# Patient Record
Sex: FEMALE | Race: WHITE | NOT HISPANIC OR LATINO
[De-identification: names, ages, dates, MRNs, and addresses within clinical notes are randomized per-mention and may not be internally consistent; named-entity substitution may affect disease eponyms.]

---

## 2017-05-04 ENCOUNTER — RESULT REVIEW (OUTPATIENT)
Age: 72
End: 2017-05-04

## 2017-05-10 ENCOUNTER — APPOINTMENT (OUTPATIENT)
Dept: MAMMOGRAPHY | Facility: CLINIC | Age: 72
End: 2017-05-10

## 2017-05-10 ENCOUNTER — APPOINTMENT (OUTPATIENT)
Dept: ULTRASOUND IMAGING | Facility: CLINIC | Age: 72
End: 2017-05-10

## 2017-06-12 ENCOUNTER — RESULT REVIEW (OUTPATIENT)
Age: 72
End: 2017-06-12

## 2017-07-19 ENCOUNTER — RESULT REVIEW (OUTPATIENT)
Age: 72
End: 2017-07-19

## 2017-09-14 ENCOUNTER — APPOINTMENT (OUTPATIENT)
Dept: MRI IMAGING | Facility: CLINIC | Age: 72
End: 2017-09-14

## 2017-09-14 ENCOUNTER — OUTPATIENT (OUTPATIENT)
Dept: OUTPATIENT SERVICES | Facility: HOSPITAL | Age: 72
LOS: 1 days | End: 2017-09-14
Payer: COMMERCIAL

## 2017-09-14 DIAGNOSIS — Z00.8 ENCOUNTER FOR OTHER GENERAL EXAMINATION: ICD-10-CM

## 2017-09-14 PROCEDURE — 72148 MRI LUMBAR SPINE W/O DYE: CPT | Mod: 26

## 2017-09-14 PROCEDURE — 72148 MRI LUMBAR SPINE W/O DYE: CPT

## 2019-01-10 ENCOUNTER — RESULT REVIEW (OUTPATIENT)
Age: 74
End: 2019-01-10

## 2019-01-14 ENCOUNTER — APPOINTMENT (OUTPATIENT)
Dept: UROLOGY | Facility: CLINIC | Age: 74
End: 2019-01-14
Payer: COMMERCIAL

## 2019-01-14 VITALS
HEART RATE: 71 BPM | HEIGHT: 61 IN | WEIGHT: 153 LBS | SYSTOLIC BLOOD PRESSURE: 159 MMHG | BODY MASS INDEX: 28.89 KG/M2 | OXYGEN SATURATION: 96 % | DIASTOLIC BLOOD PRESSURE: 81 MMHG | TEMPERATURE: 98 F

## 2019-01-14 DIAGNOSIS — E78.00 PURE HYPERCHOLESTEROLEMIA, UNSPECIFIED: ICD-10-CM

## 2019-01-14 DIAGNOSIS — Z87.891 PERSONAL HISTORY OF NICOTINE DEPENDENCE: ICD-10-CM

## 2019-01-14 DIAGNOSIS — Z85.038 PERSONAL HISTORY OF OTHER MALIGNANT NEOPLASM OF LARGE INTESTINE: ICD-10-CM

## 2019-01-14 DIAGNOSIS — Z82.49 FAMILY HISTORY OF ISCHEMIC HEART DISEASE AND OTHER DISEASES OF THE CIRCULATORY SYSTEM: ICD-10-CM

## 2019-01-14 DIAGNOSIS — Z78.9 OTHER SPECIFIED HEALTH STATUS: ICD-10-CM

## 2019-01-14 PROCEDURE — 99203 OFFICE O/P NEW LOW 30 MIN: CPT

## 2019-01-14 NOTE — LETTER BODY
[Dear  ___] : Dear  [unfilled], [Consult Letter:] : I had the pleasure of evaluating your patient, [unfilled]. [( Thank you for referring [unfilled] for consultation for _____ )] : Thank you for referring [unfilled] for consultation for [unfilled] [Please see my note below.] : Please see my note below. [Consult Closing:] : Thank you very much for allowing me to participate in the care of this patient.  If you have any questions, please do not hesitate to contact me. [Sincerely,] : Sincerely, [FreeTextEntry3] : Isaac Mckay MD\par  of Urology\par [facility]\par \par Offices:\par [facility] @\par [address], [place] [address]\par and\par [address], [place] [address], [address]\par and\par [address] [address]\par \par TEL: [phone]\par FAX: [phone]

## 2019-01-14 NOTE — PHYSICAL EXAM
[General Appearance - Well Developed] : well developed [Normal Appearance] : normal appearance [General Appearance - In No Acute Distress] : no acute distress [FreeTextEntry1] : normal peripheral circulation  [] : no respiratory distress [Abdomen Soft] : soft [Abdomen Tenderness] : non-tender [Abdomen Mass (___ Cm)] : no abdominal mass palpated [Costovertebral Angle Tenderness] : no ~M costovertebral angle tenderness [Normal Station and Gait] : the gait and station were normal for the patient's age [Skin Color & Pigmentation] : normal skin color and pigmentation [No Focal Deficits] : no focal deficits [Oriented To Time, Place, And Person] : oriented to person, place, and time [No Palpable Adenopathy] : no palpable adenopathy

## 2019-01-14 NOTE — HISTORY OF PRESENT ILLNESS
[FreeTextEntry1] : 72 yo female presents for Renal cyst. \par Has history of Renal cyst and has been getting Renal Ultrasound. Recent US with increase in size. \par Also has been having right lower back pain radiating to lower extremity. HAs history of Sciatica. \par Denies any difficulty with urination. \par Denies dysuria, fever, chills or rigors. \par Has been told of blood in urine, denies gross hematuria or history of Kidney stone. \par

## 2019-01-14 NOTE — ASSESSMENT
[FreeTextEntry1] : Reviewed outside records.\par 10 cm simple right renal cyst. \par Urine tests either UTI or contamination. \par \par Renal cyst:\par Discussed that Renal cysts are a common finding on routine radiological studies. Autopsy studies in patients over the age of 50 reveal greater than a 50% chance of having at least one simple renal cyst.\par And that renal cysts may be classified as simple or complex depending how they look on imaging. Discussed complexity of renal cysts and its implications as regards malignancy. \par Discussed unlike her back pain from Renal cyst. \par Discussed treatment options of Surgery Vs Aspiration. \par Asked pt to see Neurology and if no other source for back pain will consider surgery. \par \par Pt instructed on clean catch mid stream urine. Will do it with Primary care physician. \par If positive for hematuria will need work up with Urinalysis, Urine culture and Urine cytology, CT scan with and with out contrast and Cystoscopy. \par

## 2019-01-14 NOTE — REVIEW OF SYSTEMS
[Palpitations] : palpitations [Shortness Of Breath] : shortness of breath [Heartburn] : heartburn [Leakage of urine with straining, coughing, laughing] : leakage of urine with straining, coughing, laughing [Joint Pain] : joint pain [Muscle Weakness] : muscle weakness [Negative] : Heme/Lymph

## 2019-03-20 ENCOUNTER — EMERGENCY (EMERGENCY)
Facility: HOSPITAL | Age: 74
LOS: 1 days | End: 2019-03-20
Attending: STUDENT IN AN ORGANIZED HEALTH CARE EDUCATION/TRAINING PROGRAM
Payer: COMMERCIAL

## 2019-03-20 VITALS
RESPIRATION RATE: 16 BRPM | TEMPERATURE: 98 F | SYSTOLIC BLOOD PRESSURE: 126 MMHG | HEART RATE: 72 BPM | DIASTOLIC BLOOD PRESSURE: 64 MMHG | OXYGEN SATURATION: 99 %

## 2019-03-20 VITALS
RESPIRATION RATE: 22 BRPM | DIASTOLIC BLOOD PRESSURE: 77 MMHG | HEART RATE: 92 BPM | TEMPERATURE: 98 F | OXYGEN SATURATION: 97 % | SYSTOLIC BLOOD PRESSURE: 180 MMHG | WEIGHT: 151.9 LBS

## 2019-03-20 LAB
ALBUMIN SERPL ELPH-MCNC: 3.6 G/DL — SIGNIFICANT CHANGE UP (ref 3.3–5)
ALP SERPL-CCNC: 82 U/L — SIGNIFICANT CHANGE UP (ref 40–120)
ALT FLD-CCNC: 22 U/L — SIGNIFICANT CHANGE UP (ref 12–78)
AMYLASE P1 CFR SERPL: 80 U/L — SIGNIFICANT CHANGE UP (ref 25–115)
ANION GAP SERPL CALC-SCNC: 9 MMOL/L — SIGNIFICANT CHANGE UP (ref 5–17)
AST SERPL-CCNC: 10 U/L — LOW (ref 15–37)
BASOPHILS # BLD AUTO: 0.02 K/UL — SIGNIFICANT CHANGE UP (ref 0–0.2)
BASOPHILS NFR BLD AUTO: 0.2 % — SIGNIFICANT CHANGE UP (ref 0–2)
BILIRUB SERPL-MCNC: 0.7 MG/DL — SIGNIFICANT CHANGE UP (ref 0.2–1.2)
BUN SERPL-MCNC: 19 MG/DL — SIGNIFICANT CHANGE UP (ref 7–23)
CALCIUM SERPL-MCNC: 8.5 MG/DL — SIGNIFICANT CHANGE UP (ref 8.5–10.1)
CHLORIDE SERPL-SCNC: 102 MMOL/L — SIGNIFICANT CHANGE UP (ref 96–108)
CK MB BLD-MCNC: 2.6 % — SIGNIFICANT CHANGE UP (ref 0–3.5)
CK MB BLD-MCNC: 2.7 % — SIGNIFICANT CHANGE UP (ref 0–3.5)
CK MB CFR SERPL CALC: 2 NG/ML — SIGNIFICANT CHANGE UP (ref 0–3.6)
CK MB CFR SERPL CALC: 2.7 NG/ML — SIGNIFICANT CHANGE UP (ref 0–3.6)
CK SERPL-CCNC: 104 U/L — SIGNIFICANT CHANGE UP (ref 26–192)
CK SERPL-CCNC: 73 U/L — SIGNIFICANT CHANGE UP (ref 26–192)
CO2 SERPL-SCNC: 30 MMOL/L — SIGNIFICANT CHANGE UP (ref 22–31)
CREAT SERPL-MCNC: 0.91 MG/DL — SIGNIFICANT CHANGE UP (ref 0.5–1.3)
EOSINOPHIL # BLD AUTO: 0.21 K/UL — SIGNIFICANT CHANGE UP (ref 0–0.5)
EOSINOPHIL NFR BLD AUTO: 2.1 % — SIGNIFICANT CHANGE UP (ref 0–6)
GLUCOSE SERPL-MCNC: 129 MG/DL — HIGH (ref 70–99)
HCT VFR BLD CALC: 42.2 % — SIGNIFICANT CHANGE UP (ref 34.5–45)
HGB BLD-MCNC: 13.1 G/DL — SIGNIFICANT CHANGE UP (ref 11.5–15.5)
IMM GRANULOCYTES NFR BLD AUTO: 0.9 % — SIGNIFICANT CHANGE UP (ref 0–1.5)
INR BLD: 0.91 RATIO — SIGNIFICANT CHANGE UP (ref 0.88–1.16)
LIDOCAIN IGE QN: 290 U/L — SIGNIFICANT CHANGE UP (ref 73–393)
LYMPHOCYTES # BLD AUTO: 2.59 K/UL — SIGNIFICANT CHANGE UP (ref 1–3.3)
LYMPHOCYTES # BLD AUTO: 26.3 % — SIGNIFICANT CHANGE UP (ref 13–44)
MANUAL SMEAR VERIFICATION: SIGNIFICANT CHANGE UP
MCHC RBC-ENTMCNC: 20.6 PG — LOW (ref 27–34)
MCHC RBC-ENTMCNC: 31 GM/DL — LOW (ref 32–36)
MCV RBC AUTO: 66.2 FL — LOW (ref 80–100)
MONOCYTES # BLD AUTO: 0.92 K/UL — HIGH (ref 0–0.9)
MONOCYTES NFR BLD AUTO: 9.3 % — SIGNIFICANT CHANGE UP (ref 2–14)
NEUTROPHILS # BLD AUTO: 6.03 K/UL — SIGNIFICANT CHANGE UP (ref 1.8–7.4)
NEUTROPHILS NFR BLD AUTO: 61.2 % — SIGNIFICANT CHANGE UP (ref 43–77)
NRBC # BLD: 0 /100 WBCS — SIGNIFICANT CHANGE UP (ref 0–0)
PLAT MORPH BLD: NORMAL — SIGNIFICANT CHANGE UP
PLATELET # BLD AUTO: 237 K/UL — SIGNIFICANT CHANGE UP (ref 150–400)
POTASSIUM SERPL-MCNC: 3.8 MMOL/L — SIGNIFICANT CHANGE UP (ref 3.5–5.3)
POTASSIUM SERPL-SCNC: 3.8 MMOL/L — SIGNIFICANT CHANGE UP (ref 3.5–5.3)
PROT SERPL-MCNC: 7.2 G/DL — SIGNIFICANT CHANGE UP (ref 6–8.3)
PROTHROM AB SERPL-ACNC: 10.4 SEC — SIGNIFICANT CHANGE UP (ref 10–12.9)
RBC # BLD: 6.37 M/UL — HIGH (ref 3.8–5.2)
RBC # FLD: 17.7 % — HIGH (ref 10.3–14.5)
RBC BLD AUTO: SIGNIFICANT CHANGE UP
SODIUM SERPL-SCNC: 141 MMOL/L — SIGNIFICANT CHANGE UP (ref 135–145)
TROPONIN I SERPL-MCNC: <.015 NG/ML — SIGNIFICANT CHANGE UP (ref 0.01–0.04)
TROPONIN I SERPL-MCNC: <.015 NG/ML — SIGNIFICANT CHANGE UP (ref 0.01–0.04)
WBC # BLD: 9.86 K/UL — SIGNIFICANT CHANGE UP (ref 3.8–10.5)
WBC # FLD AUTO: 9.86 K/UL — SIGNIFICANT CHANGE UP (ref 3.8–10.5)

## 2019-03-20 PROCEDURE — 85027 COMPLETE CBC AUTOMATED: CPT

## 2019-03-20 PROCEDURE — 96375 TX/PRO/DX INJ NEW DRUG ADDON: CPT

## 2019-03-20 PROCEDURE — 36415 COLL VENOUS BLD VENIPUNCTURE: CPT

## 2019-03-20 PROCEDURE — 96374 THER/PROPH/DIAG INJ IV PUSH: CPT

## 2019-03-20 PROCEDURE — 99285 EMERGENCY DEPT VISIT HI MDM: CPT

## 2019-03-20 PROCEDURE — 84484 ASSAY OF TROPONIN QUANT: CPT

## 2019-03-20 PROCEDURE — 83690 ASSAY OF LIPASE: CPT

## 2019-03-20 PROCEDURE — 82150 ASSAY OF AMYLASE: CPT

## 2019-03-20 PROCEDURE — 99285 EMERGENCY DEPT VISIT HI MDM: CPT | Mod: 25

## 2019-03-20 PROCEDURE — 85610 PROTHROMBIN TIME: CPT

## 2019-03-20 PROCEDURE — 71045 X-RAY EXAM CHEST 1 VIEW: CPT | Mod: 26

## 2019-03-20 PROCEDURE — 82553 CREATINE MB FRACTION: CPT

## 2019-03-20 PROCEDURE — 96361 HYDRATE IV INFUSION ADD-ON: CPT

## 2019-03-20 PROCEDURE — 99284 EMERGENCY DEPT VISIT MOD MDM: CPT | Mod: 25

## 2019-03-20 PROCEDURE — 80053 COMPREHEN METABOLIC PANEL: CPT

## 2019-03-20 PROCEDURE — 93005 ELECTROCARDIOGRAM TRACING: CPT

## 2019-03-20 PROCEDURE — 93010 ELECTROCARDIOGRAM REPORT: CPT

## 2019-03-20 PROCEDURE — 82550 ASSAY OF CK (CPK): CPT

## 2019-03-20 PROCEDURE — 71045 X-RAY EXAM CHEST 1 VIEW: CPT

## 2019-03-20 RX ORDER — ASPIRIN/CALCIUM CARB/MAGNESIUM 324 MG
325 TABLET ORAL ONCE
Qty: 0 | Refills: 0 | Status: COMPLETED | OUTPATIENT
Start: 2019-03-20 | End: 2019-03-20

## 2019-03-20 RX ORDER — MORPHINE SULFATE 50 MG/1
2 CAPSULE, EXTENDED RELEASE ORAL ONCE
Qty: 0 | Refills: 0 | Status: DISCONTINUED | OUTPATIENT
Start: 2019-03-20 | End: 2019-03-20

## 2019-03-20 RX ORDER — SODIUM CHLORIDE 9 MG/ML
1000 INJECTION INTRAMUSCULAR; INTRAVENOUS; SUBCUTANEOUS ONCE
Qty: 0 | Refills: 0 | Status: COMPLETED | OUTPATIENT
Start: 2019-03-20 | End: 2019-03-20

## 2019-03-20 RX ORDER — KETOROLAC TROMETHAMINE 30 MG/ML
30 SYRINGE (ML) INJECTION ONCE
Qty: 0 | Refills: 0 | Status: DISCONTINUED | OUTPATIENT
Start: 2019-03-20 | End: 2019-03-20

## 2019-03-20 RX ORDER — FAMOTIDINE 10 MG/ML
20 INJECTION INTRAVENOUS ONCE
Qty: 0 | Refills: 0 | Status: COMPLETED | OUTPATIENT
Start: 2019-03-20 | End: 2019-03-20

## 2019-03-20 RX ADMIN — Medication 30 MILLILITER(S): at 05:53

## 2019-03-20 RX ADMIN — Medication 30 MILLILITER(S): at 04:11

## 2019-03-20 RX ADMIN — SODIUM CHLORIDE 1000 MILLILITER(S): 9 INJECTION INTRAMUSCULAR; INTRAVENOUS; SUBCUTANEOUS at 05:10

## 2019-03-20 RX ADMIN — Medication 30 MILLIGRAM(S): at 05:54

## 2019-03-20 RX ADMIN — SODIUM CHLORIDE 1000 MILLILITER(S): 9 INJECTION INTRAMUSCULAR; INTRAVENOUS; SUBCUTANEOUS at 04:10

## 2019-03-20 RX ADMIN — FAMOTIDINE 20 MILLIGRAM(S): 10 INJECTION INTRAVENOUS at 04:14

## 2019-03-20 RX ADMIN — Medication 325 MILLIGRAM(S): at 04:10

## 2019-03-20 NOTE — ED ADULT NURSE NOTE - OBJECTIVE STATEMENT
Pt received alert and oriented x 4 c/o center chest pain 10/10 burning radiating to throat x hours with sob, dyspnea. Pt denies fever, chills, n/v/d. Pt reports pain worsen with breathing, denies any alleviating factors.

## 2019-03-20 NOTE — ED PROVIDER NOTE - NSFOLLOWUPINSTRUCTIONS_ED_ALL_ED_FT
1)  Follow-up with your Primary Medical Doctor or referred doctor. Call today / next business day for prompt follow-up.  2) Follow-up with Cardiology as discussed. Call today / next business day for prompt follow-up and further workup and evaluation.  3) Return to Emergency room for any worsening or persistent pain, shortness of breath, weakness, fever, abdominal pain, dizziness, passing out, unexplained pain in arms, legs, back, any vomiting, feeling like your heart is racing,  or any other concerning symptoms.  4) See attached instruction sheets for additional information, including information regarding signs and symptoms to look out for, reasons to seek immediate care and other important instructions.  5) Aspirin once daily

## 2019-03-20 NOTE — ED PROVIDER NOTE - PROGRESS NOTE DETAILS
Pt seen by Dr Short, can check CE now, outpt fu with him in office. At length discussion with patient regarding nature of the chest pain. Discussed results of all diagnostic testing in ED. Discussed chest pain precautions and instructions. Patient demonstrates understanding that a cardiac cause of the chest pain has not been totally excluded, but at this time there is no evidence to warrant an inpatient workup.  Discussed the importance of continued follow-up with Cardiology as outpatient to complete cardiac workup.

## 2019-03-20 NOTE — CONSULT NOTE ADULT - SUBJECTIVE AND OBJECTIVE BOX
[facility] - Cyrus Young, Dhaval, Kati, Nancy, Deja Georges  Office Number: [phone]    Initial Consult Note    CHIEF COMPLAINT: Patient is a 74y old  Female who presents with a chief complaint of chest pain.     HPI: 74 year old f with a Pmhx of colon cancer s/p resection 2009, HTN, HLD and GERD who presented with chest pain that woke her up from sleep around 2AM this morning. She reports diffuse midsternal chest burning.  Denies n/v/SOB/diaphoresis.  She has a history of GERD but states this pain is different and stronger.  No history of DM, smoking.  Reports +family history of cardiac disease. (Contrary to triage note, patient states jaw pain is chronic). She did not take any medication for this at home.  States she has been in the ED in the past with similar pain and diagnosed with costochondritis.      PAST MEDICAL & SURGICAL HISTORY:  Colon Cancer: s/p colon rection 3/25/09  Thyroid Nodule: benign since /08  Hernia, Umbilical  FHx: Gastric Ulcer: medication controll  History of Ovarian Cyst: left  Single Renal Cyst: right  Functional Murmur: childhood polo and rheumatic fever  H/O: Hypertension  Right Facial Numbness: s/p repair of right facial muscle  S/P Colonoscopy with Polypectomy  S/P Endoscopy  S/P Colon Resection  S/P Tubal Ligation  S/P Cone Biopsy of Cervix: due to abnormal PAP 1994 and 2008      SOCIAL HISTORY:  No tobacco, ethanol, or drug abuse.    FAMILY HISTORY:    No family history of acute MI or sudden cardiac death.    MEDICATIONS  (STANDING):    MEDICATIONS  (PRN):      Allergies    No Known Allergies    Intolerances        REVIEW OF SYSTEMS:    CONSTITUTIONAL: No weakness, fevers or chills  EYES/ENT: No visual changes;  No vertigo or throat pain   NECK: No pain or stiffness  RESPIRATORY: No cough, wheezing, hemoptysis; No shortness of breath  CARDIOVASCULAR: No chest pain or palpitations  GASTROINTESTINAL: No abdominal pain. No nausea, vomiting, or hematemesis; No diarrhea or constipation. No melena or hematochezia.  GENITOURINARY: No dysuria, frequency or hematuria  NEUROLOGICAL: No numbness or weakness  SKIN: No itching or rash  All other review of systems is negative unless indicated above    VITAL SIGNS:   Vital Signs Last 24 Hrs  T(C): 36.5 (20 Mar 2019 04:12), Max: 36.5 (20 Mar 2019 04:12)  T(F): 97.7 (20 Mar 2019 04:12), Max: 97.7 (20 Mar 2019 04:12)  HR: 76 (20 Mar 2019 04:12) (76 - 92)  BP: 147/62 (20 Mar 2019 04:12) (147/62 - 180/77)  BP(mean): --  RR: 20 (20 Mar 2019 04:12) (20 - 22)  SpO2: 98% (20 Mar 2019 04:12) (97% - 98%)    I&O's Summary      On Exam:    Constitutional: NAD, alert and oriented x 3  Lungs:  Non-labored, breath sounds are clear bilaterally, No wheezing, rales or rhonchi  Cardiovascular: RRR.  S1 and S2 positive.  No murmurs, rubs, gallops or clicks  Gastrointestinal: Bowel Sounds present, soft, nontender.   Lymph: No peripheral edema. No cervical lymphadenopathy.  Neurological: Alert, no focal deficits  Skin: No rashes or ulcers   Psych:  Mood & affect appropriate.    LABS: All Labs Reviewed:                        13.1   9.86  )-----------( 237      ( 20 Mar 2019 03:34 )             42.2     20 Mar 2019 03:34    141    |  102    |  19     ----------------------------<  129    3.8     |  30     |  0.91     Ca    8.5        20 Mar 2019 03:34    TPro  7.2    /  Alb  3.6    /  TBili  0.7    /  DBili  x      /  AST  10     /  ALT  22     /  AlkPhos  82     20 Mar 2019 03:34    PT/INR - ( 20 Mar 2019 03:34 )   PT: 10.4 sec;   INR: 0.91 ratio           CARDIAC MARKERS ( 20 Mar 2019 03:34 )  <.015 ng/mL / x     / 104 U/L / x     / 2.7 ng/mL      Blood Culture:         RADIOLOGY:    EKG:    Assessment/Plan:  74y Female [facility] - Cyrus Young, Dhaval, Kati, Nancy, Deja Georges  Office Number: [phone]    Initial Consult Note    CHIEF COMPLAINT: Patient is a 74y old  Female who presents with a chief complaint of chest pain.     HPI: 74 year old f with a Pmhx of colon cancer s/p resection 2009, HTN, HLD and GERD who presented with chest pain that woke her up from sleep around 2AM this morning. Pain began on he right side of the chest 9/10 severity and radiated to the left side. She reports diffuse midsternal chest burning now 2/10 severity and diffuse tenderness to palpation. Pt reports she did not eat much yesterday only drank green tea. Pt reports dry cough began around 3am last night and has increased chest pain with coughing. Pt did not take any medication at home to reduce the pain. Denies n/v/SOB/diaphoresis.  She has a history of GERD but states this pain is different and stronger.  No history of DM, admits to quitting smoking 36 years ago.  Reports +family history of cardiac disease. (Contrary to triage note, patient states jaw pain is chronic). She did not take any medication for this at home.  States she has been in the ED in the past with similar pain and diagnosed with costochondritis.     Patient was given ASA 325mg PO, 20mg famotidine IV and 1000ml NS bolus in the ED      PAST MEDICAL & SURGICAL HISTORY:  Colon Cancer: s/p colon rection 3/25/09  Thyroid Nodule: benign since /08  Hernia, Umbilical  FHx: Gastric Ulcer: medication controll  History of Ovarian Cyst: left  Single Renal Cyst: right  Functional Murmur: childhood polo and rheumatic fever  H/O: Hypertension  Right Facial Numbness: s/p repair of right facial muscle  S/P Colonoscopy with Polypectomy  S/P Endoscopy  S/P Colon Resection  S/P Tubal Ligation  S/P Cone Biopsy of Cervix: due to abnormal PAP 1994 and 2008      SOCIAL HISTORY:  quit smoking 36 years ago, denies ethanol, or drug abuse.    FAMILY HISTORY:    Significant family history of acute MI in parents and grandparents    MEDICATIONS  (STANDING):  Valsartan  Metoprolol  Statin    MEDICATIONS  (PRN):      Allergies    No Known Allergies    Intolerances        REVIEW OF SYSTEMS:    CONSTITUTIONAL: No weakness, fevers or chills  EYES/ENT: No visual changes;  No vertigo or throat pain   NECK: No pain or stiffness  RESPIRATORY: Dry cough, denies wheezing, hemoptysis; No shortness of breath  CARDIOVASCULAR: admits to burning substernal chest pain, and diffuse chest pain with palpation denies palpitations  GASTROINTESTINAL: No abdominal pain. No nausea, vomiting, or hematemesis; No diarrhea or constipation. No melena or hematochezia.  GENITOURINARY: No dysuria, frequency or hematuria  NEUROLOGICAL: No numbness or weakness  SKIN: No itching or rash  All other review of systems is negative unless indicated above    VITAL SIGNS:   Vital Signs Last 24 Hrs  T(C): 36.5 (20 Mar 2019 04:12), Max: 36.5 (20 Mar 2019 04:12)  T(F): 97.7 (20 Mar 2019 04:12), Max: 97.7 (20 Mar 2019 04:12)  HR: 76 (20 Mar 2019 04:12) (76 - 92)  BP: 147/62 (20 Mar 2019 04:12) (147/62 - 180/77)  BP(mean): --  RR: 20 (20 Mar 2019 04:12) (20 - 22)  SpO2: 98% (20 Mar 2019 04:12) (97% - 98%)    I&O's Summary      On Exam:    Constitutional: NAD, alert and oriented x 3  Lungs:  Non-labored, breath sounds are clear bilaterally, No wheezing, rales or rhonchi  Cardiovascular: Diffuse chest pain with light palpation. RRR.  S1 and S2 positive.  No murmurs, rubs, gallops or clicks  Gastrointestinal: Bowel Sounds present, soft, nontender.   Lymph: No peripheral edema. No cervical lymphadenopathy.  Neurological: Alert, no focal deficits  Skin: No rashes or ulcers   Psych:  Mood & affect appropriate.    LABS: All Labs Reviewed:                        13.1   9.86  )-----------( 237      ( 20 Mar 2019 03:34 )             42.2     20 Mar 2019 03:34    141    |  102    |  19     ----------------------------<  129    3.8     |  30     |  0.91     Ca    8.5        20 Mar 2019 03:34    TPro  7.2    /  Alb  3.6    /  TBili  0.7    /  DBili  x      /  AST  10     /  ALT  22     /  AlkPhos  82     20 Mar 2019 03:34    PT/INR - ( 20 Mar 2019 03:34 )   PT: 10.4 sec;   INR: 0.91 ratio           CARDIAC MARKERS ( 20 Mar 2019 03:34 )  <.015 ng/mL / x     / 104 U/L / x     / 2.7 ng/mL      Blood Culture:         RADIOLOGY:  CXR: no acute findings     EKG:   Sinus rhythm with short WV, otherwise normal  BPM 83  WV 96ms  QRS 76ms  QT/QTc 384/451    Assessment/Plan:  74y Female [facility] - Cyrus Young, Dhaval, Kati, Nancy, Deja Georges  Office Number: [phone]    Initial Consult Note    CHIEF COMPLAINT: Patient is a 74y old  Female who presents with a chief complaint of chest pain.     HPI: 74 year old f with a Pmhx of colon cancer s/p resection 2009, HTN, HLD and GERD who presented with chest pain that woke her up from sleep around 2AM this morning. Pain began on he right side of the chest 9/10 severity and radiated to the left side. She reports diffuse midsternal chest burning now 2/10 severity and diffuse tenderness to palpation. Pt reports she did not eat much yesterday only drank green tea. Pt reports dry cough began around 3am last night and has increased chest pain with coughing. Pt did not take any medication at home to reduce the pain. Denies n/v/SOB/diaphoresis.  She has a history of GERD but states this pain is different and stronger.  No history of DM, admits to quitting smoking 36 years ago.  Reports +family history of cardiac disease. (Contrary to triage note, patient states jaw pain is chronic). She did not take any medication for this at home.  States she has been in the ED in the past with similar pain and diagnosed with costochondritis.     Patient was given ASA 325mg PO, 20mg famotidine IV and 1000ml NS bolus in the ED      PAST MEDICAL & SURGICAL HISTORY:  Colon Cancer: s/p colon rection 3/25/09  Thyroid Nodule: benign since /08  Hernia, Umbilical  FHx: Gastric Ulcer: medication controll  History of Ovarian Cyst: left  Single Renal Cyst: right  Functional Murmur: childhood polo and rheumatic fever  H/O: Hypertension  Right Facial Numbness: s/p repair of right facial muscle  S/P Colonoscopy with Polypectomy  S/P Endoscopy  S/P Colon Resection  S/P Tubal Ligation  S/P Cone Biopsy of Cervix: due to abnormal PAP 1994 and 2008      SOCIAL HISTORY:  quit smoking 36 years ago, denies ethanol, or drug abuse.    FAMILY HISTORY:    Significant family history of acute MI in parents and grandparents    MEDICATIONS  (STANDING):  Valsartan  Metoprolol  Statin    MEDICATIONS  (PRN):      Allergies    No Known Allergies    Intolerances        REVIEW OF SYSTEMS:    CONSTITUTIONAL: No weakness, fevers or chills  EYES/ENT: No visual changes;  No vertigo or throat pain   NECK: No pain or stiffness  RESPIRATORY: Dry cough, denies wheezing, hemoptysis; No shortness of breath  CARDIOVASCULAR: admits to burning substernal chest pain, and diffuse chest pain with palpation denies palpitations  GASTROINTESTINAL: No abdominal pain. No nausea, vomiting, or hematemesis; No diarrhea or constipation. No melena or hematochezia.  GENITOURINARY: No dysuria, frequency or hematuria  NEUROLOGICAL: No numbness or weakness  SKIN: No itching or rash  All other review of systems is negative unless indicated above    VITAL SIGNS:   Vital Signs Last 24 Hrs  T(C): 36.5 (20 Mar 2019 04:12), Max: 36.5 (20 Mar 2019 04:12)  T(F): 97.7 (20 Mar 2019 04:12), Max: 97.7 (20 Mar 2019 04:12)  HR: 76 (20 Mar 2019 04:12) (76 - 92)  BP: 147/62 (20 Mar 2019 04:12) (147/62 - 180/77)  BP(mean): --  RR: 20 (20 Mar 2019 04:12) (20 - 22)  SpO2: 98% (20 Mar 2019 04:12) (97% - 98%)    I&O's Summary      On Exam:    Constitutional: NAD, alert and oriented x 3  Lungs:  Non-labored, breath sounds are clear bilaterally, No wheezing, rales or rhonchi  Cardiovascular: Diffuse chest pain with light palpation. RRR.  S1 and S2 positive.  No murmurs, rubs, gallops or clicks  Gastrointestinal: Bowel Sounds present, soft, nontender.   Lymph: No peripheral edema. No cervical lymphadenopathy.  Neurological: Alert, no focal deficits  Skin: No rashes or ulcers   Psych:  Mood & affect appropriate.    LABS: All Labs Reviewed:                        13.1   9.86  )-----------( 237      ( 20 Mar 2019 03:34 )             42.2     20 Mar 2019 03:34    141    |  102    |  19     ----------------------------<  129    3.8     |  30     |  0.91     Ca    8.5        20 Mar 2019 03:34    TPro  7.2    /  Alb  3.6    /  TBili  0.7    /  DBili  x      /  AST  10     /  ALT  22     /  AlkPhos  82     20 Mar 2019 03:34    PT/INR - ( 20 Mar 2019 03:34 )   PT: 10.4 sec;   INR: 0.91 ratio           CARDIAC MARKERS ( 20 Mar 2019 03:34 )  <.015 ng/mL / x     / 104 U/L / x     / 2.7 ng/mL      Blood Culture:         RADIOLOGY:  CXR: no acute findings     EKG:   Sinus rhythm with short SD, otherwise normal  BPM 83  SD 96ms  QRS 76ms  QT/QTc 384/451

## 2019-03-20 NOTE — ED PROVIDER NOTE - PSH
Right Facial Numbness  s/p repair of right facial muscle  S/P Colon Resection    S/P Colonoscopy with Polypectomy    S/P Cone Biopsy of Cervix  due to abnormal PAP 1994 and 2008  S/P Endoscopy    S/P Tubal Ligation

## 2019-03-20 NOTE — ED PROVIDER NOTE - OBJECTIVE STATEMENT
74 year old female with a history of HTN, HLD, GERD presents with chest pain that woke her up from sleep at 2am this morning.  She reports diffuse midsternal chest burning.  Denies n/v/SOB/diaphoresis.  She has a history of GERD but states this pain is different and stronger.  No history of DM, smoking.  Reports +family history of cardiac disease.  She did not take any medication for this at home.  PMD Dr. Savannah Rob. 74 year old female with a history of HTN, HLD, GERD presents with chest pain that woke her up from sleep at 2am this morning.  She reports diffuse midsternal chest burning.  Denies n/v/SOB/diaphoresis.  She has a history of GERD but states this pain is different and stronger.  No history of DM, smoking.  Reports +family history of cardiac disease. (Contrary to triage note, patient states jaw pain is chronic). She did not take any medication for this at home.  States she has been in the ED in the past with similar pain and diagnosed with costochondritis.   PMD Dr. Savannah Rob.

## 2019-03-20 NOTE — ED PROVIDER NOTE - CLINICAL SUMMARY MEDICAL DECISION MAKING FREE TEXT BOX
74 year old female with chest pain that work her up from sleep.  CE x 2, EKG, CXR, cardiology consult, ASA, treat for GERD

## 2019-03-20 NOTE — ED ADULT TRIAGE NOTE - NSWEIGHTCALCTOOLDRUG_GEN_A_CORE
TN met with pt,  Rey  used

## 2019-03-20 NOTE — ED ADULT NURSE NOTE - PMH
Colon Cancer  s/p colon rection 3/25/09  FHx: Gastric Ulcer  medication controll  Functional Murmur  childhood polo and rheumatic fever  H/O: Hypertension    Hernia, Umbilical    History of Ovarian Cyst  left  Single Renal Cyst  right  Thyroid Nodule  benign since /08

## 2019-03-20 NOTE — ED PROVIDER NOTE - PROVIDER TOKENS
PROVIDER:[TOKEN:[[id number]]],FREE:[LAST:[Dr Rodrigez],PHONE:[(   )    -],FAX:[(   )    -],FOLLOWUP:[1-3 Days]]

## 2019-03-20 NOTE — CONSULT NOTE ADULT - ASSESSMENT
74 year old f with a Pmhx of colon cancer s/p resection 2009, HTN, HLD and GERD who presented with chest pain that woke her up from sleep around 2AM this morning.    - Angina, likely costochondritis.   - EKG: sinus rhythm at 83bpm   - Troponins negative x2. 74 year old f with a PMHx of colon cancer s/p resection 2009, HTN, HLD and GERD who presented with chest pain that woke her up from sleep around 2AM this morning.    - Angina, likely costochondritis.   - EKG: sinus rhythm at 83bpm   - Troponins negative x2. No evidence of active ischemia.   - VSS reviewed and patient remains hemodyanmically stable.     - Patient is not complaining of any cardiac symptoms at this time.  - No clear evidence of acute ischemia, trops negative x 2. Will follow up third set.  - His CKs are flat, suggesting against acute atherosclerotic plaque rupture.  - Biomarker trend is not consistent with plaque rupture but rather demand ischemia. Monitor closely for the development of anginal symptoms or clinical signs of ischemia.   - No acute changes on EKG compared to previous.  - No meaningful evidence of volume overload.  - Previous TTE shows ___.  - BP well controlled, monitor routine hemodynamics.  - Continue ___.  - Monitor and replete lytes, keep K>4, Mg>2.  - Strict I/Os, daily weights.  - Pt has no active ischemia, decompensated heart failure, unstable arrythmia, or severe stenotic valvular disease, and has __ cardiac risk factors. In the setting of low risk ___, he/she is optimized from cardiovascular standpoint to proceed with planned procedure with routine hemodynamic monitoring.   - Pt has no modifiable active cardiac risk factors and in the setting of low risk _____, patient is optimized as best as possible from cardiovascular standpoint to proceed with planned procedure with routine hemodynamic monitoring.   - Other cardiovascular workup will depend on clinical course.  - All other workup per primary team.  - Will continue to follow. 74 year old f with a PMHx of colon cancer s/p resection 2009, HTN, HLD and GERD who presented with chest pain that woke her up from sleep around 2AM this morning.    - Angina, likely costochondritis possible GERD.   - EKG: sinus rhythm at 83bpm   - Troponins negative x1. No evidence of active ischemia.   - VSS reviewed and patient remains hemodyanmically stable.     - Patient is not complaining of any cardiac symptoms at this time.  - No clear evidence of acute ischemia, trops negative x 1. Will follow up second set.  - No meaningful evidence of volume overload.  - BP well controlled, monitor routine hemodynamics.  - Monitor and replete lytes, keep K>4, Mg>2.  - Other cardiovascular workup will depend on clinical course.  - Recommend NSAIDs for costochondritis  - Recommend PPI for GERD  - All other workup per primary team.  - recommended to follow up outpatient cardiologist for cardiac stress test or cardiac CT 74 year old f with a PMHx of colon cancer s/p resection 2009, HTN, HLD and GERD who presented with chest pain that woke her up from sleep around 2AM this morning.    - Pain is reproducible on exam suggestive of a MSK etiology  - Chest pain presumably not cardiac in etiology; likely costochondritis with GERD.   - EKG: sinus rhythm at 83bpm   - Troponin is negative x1. No evidence of active ischemia. Please check second set of cardiac enzymes in 4-6 hours  - VSS reviewed and patient remains hemodynamically stable.   - No meaningful evidence of volume overload.  - Monitor and replete lytes, keep K>4, Mg>2.  - Recommend NSAIDs for costochondritis  - Recommend PPI for GERD  - If second set of CE is negative, no cardiac contraindication to d/c home  - recommended to follow up for cardiac stress test or cardiac CT

## 2019-03-20 NOTE — ED PROVIDER NOTE - CARE PROVIDER_API CALL
Arvin Short)  Internal Medicine  [address], [place] [id number]  Phone: [phone]  Fax: [phone]  Follow Up Time:     Dr Rodrigez,   Phone: (   )    -  Fax: (   )    -  Follow Up Time: 1-3 Days

## 2019-03-25 ENCOUNTER — NON-APPOINTMENT (OUTPATIENT)
Age: 74
End: 2019-03-25

## 2019-03-25 ENCOUNTER — APPOINTMENT (OUTPATIENT)
Dept: CARDIOLOGY | Facility: CLINIC | Age: 74
End: 2019-03-25
Payer: COMMERCIAL

## 2019-03-25 VITALS
DIASTOLIC BLOOD PRESSURE: 81 MMHG | HEIGHT: 61 IN | HEART RATE: 69 BPM | WEIGHT: 154 LBS | OXYGEN SATURATION: 96 % | BODY MASS INDEX: 29.07 KG/M2 | SYSTOLIC BLOOD PRESSURE: 134 MMHG

## 2019-03-25 DIAGNOSIS — R94.31 ABNORMAL ELECTROCARDIOGRAM [ECG] [EKG]: ICD-10-CM

## 2019-03-25 DIAGNOSIS — Z87.42 PERSONAL HISTORY OF OTHER DISEASES OF THE FEMALE GENITAL TRACT: ICD-10-CM

## 2019-03-25 DIAGNOSIS — Z86.39 PERSONAL HISTORY OF OTHER ENDOCRINE, NUTRITIONAL AND METABOLIC DISEASE: ICD-10-CM

## 2019-03-25 PROCEDURE — 93000 ELECTROCARDIOGRAM COMPLETE: CPT

## 2019-03-25 PROCEDURE — 99215 OFFICE O/P EST HI 40 MIN: CPT

## 2019-03-25 NOTE — PHYSICAL EXAM
[General Appearance - Well Developed] : well developed [Normal Appearance] : normal appearance [Well Groomed] : well groomed [General Appearance - Well Nourished] : well nourished [No Deformities] : no deformities [General Appearance - In No Acute Distress] : no acute distress [Normal Conjunctiva] : the conjunctiva exhibited no abnormalities [Eyelids - No Xanthelasma] : the eyelids demonstrated no xanthelasmas [Normal Oral Mucosa] : normal oral mucosa [No Oral Pallor] : no oral pallor [No Oral Cyanosis] : no oral cyanosis [Normal Jugular Venous A Waves Present] : normal jugular venous A waves present [Normal Jugular Venous V Waves Present] : normal jugular venous V waves present [No Jugular Venous Burt A Waves] : no jugular venous burt A waves [Respiration, Rhythm And Depth] : normal respiratory rhythm and effort [Exaggerated Use Of Accessory Muscles For Inspiration] : no accessory muscle use [Auscultation Breath Sounds / Voice Sounds] : lungs were clear to auscultation bilaterally [Normal Rate] : normal [Normal S1] : normal S1 [Normal S2] : normal S2 [S3] : no S3 [S4] : no S4 [No Murmur] : no murmurs heard [Right Carotid Bruit] : no bruit heard over the right carotid [Left Carotid Bruit] : no bruit heard over the left carotid [Right Femoral Bruit] : no bruit heard over the right femoral artery [Left Femoral Bruit] : no bruit heard over the left femoral artery [2+] : left 2+ [No Abnormalities] : the abdominal aorta was not enlarged and no bruit was heard [No Pitting Edema] : no pitting edema present [Abdomen Soft] : soft [Abdomen Tenderness] : non-tender [Abdomen Mass (___ Cm)] : no abdominal mass palpated [Abnormal Walk] : normal gait [Gait - Sufficient For Exercise Testing] : the gait was sufficient for exercise testing [Nail Clubbing] : no clubbing of the fingernails [Cyanosis, Localized] : no localized cyanosis [Petechial Hemorrhages (___cm)] : no petechial hemorrhages [Skin Color & Pigmentation] : normal skin color and pigmentation [] : no rash [No Venous Stasis] : no venous stasis [Skin Lesions] : no skin lesions [No Skin Ulcers] : no skin ulcer [No Xanthoma] : no  xanthoma was observed [Oriented To Time, Place, And Person] : oriented to person, place, and time [Affect] : the affect was normal [Mood] : the mood was normal [No Anxiety] : not feeling anxious

## 2019-03-25 NOTE — REASON FOR VISIT
[Follow-Up - From Hospitalization] : follow-up of a recent hospitalization for [Chest Pain] : chest pain [Spouse] : spouse

## 2019-03-25 NOTE — HISTORY OF PRESENT ILLNESS
[FreeTextEntry1] : Missy is a 74 year old female here for evaluation.\par \par I saw her in the ER on 3/20/2019 with chest pain. She was afraid that she was having a heart attack. Her blood work (CE x 2) and EKG were unremarkable. She was believed to have costochondritis, given the pain's reproducibility, and was sent home.\par \par Today, she is here for follow. She is feeling well overall, and the pain has decreased significantly in intensity, though remains. She is able to walk around without issue.\par She has a history of colon cancer status post resection in 2009, hypertension, hyperlipidemia and gastric reflux. She had an echocardiogram in 11/2018 that was unremarkable.\par \par She denies significant dyspnea on exertion, palpitations, but does report chronic jaw pain. She has a strong family history of coronary artery disease, and she is a former smoker, quitting about 36 years ago.

## 2019-03-25 NOTE — DISCUSSION/SUMMARY
[___ Month(s)] : [unfilled] month(s) [With Me] : with me [FreeTextEntry1] : Missy is a 74 year old female here for evaluation.\par She has a strong family history of coronary artery disease, and is worried that her chest pain is cardiac related. She is adamantly against the idea of a stress test. \par \par She had an echocardiogram in 11/2018 that was generally unremarkable.\par Given her continued chest pain, and risk factors, I recommended that she have some form of further evaluation. I think a coronary CTA with calcium score is fair, and will guide our blood pressure and hyperlipidemia management, and rule out obstructive CAD.\par \par She will continue her current blood pressure management. She has been taking aspirin occasionally.\par \par I have stressed the importance of staying active, and diet. She will see a new gastroenterologist regarding her reflux. Her pain is presumably multifactorial, and may be reflux and costochondritis.\par

## 2020-06-03 ENCOUNTER — RESULT REVIEW (OUTPATIENT)
Age: 75
End: 2020-06-03

## 2020-10-12 ENCOUNTER — OUTPATIENT (OUTPATIENT)
Dept: OUTPATIENT SERVICES | Facility: HOSPITAL | Age: 75
LOS: 1 days | End: 2020-10-12
Payer: COMMERCIAL

## 2020-10-12 DIAGNOSIS — Z11.59 ENCOUNTER FOR SCREENING FOR OTHER VIRAL DISEASES: ICD-10-CM

## 2020-10-12 LAB — SARS-COV-2 RNA SPEC QL NAA+PROBE: SIGNIFICANT CHANGE UP

## 2020-10-12 PROCEDURE — U0003: CPT

## 2020-10-15 ENCOUNTER — OUTPATIENT (OUTPATIENT)
Dept: OUTPATIENT SERVICES | Facility: HOSPITAL | Age: 75
LOS: 1 days | End: 2020-10-15
Payer: COMMERCIAL

## 2020-10-15 VITALS
WEIGHT: 145.06 LBS | SYSTOLIC BLOOD PRESSURE: 145 MMHG | TEMPERATURE: 99 F | DIASTOLIC BLOOD PRESSURE: 64 MMHG | RESPIRATION RATE: 12 BRPM | HEIGHT: 61 IN | OXYGEN SATURATION: 98 % | HEART RATE: 63 BPM

## 2020-10-15 VITALS
OXYGEN SATURATION: 96 % | HEART RATE: 68 BPM | SYSTOLIC BLOOD PRESSURE: 115 MMHG | RESPIRATION RATE: 16 BRPM | DIASTOLIC BLOOD PRESSURE: 64 MMHG

## 2020-10-15 DIAGNOSIS — I20.9 ANGINA PECTORIS, UNSPECIFIED: ICD-10-CM

## 2020-10-15 LAB
ANION GAP SERPL CALC-SCNC: 11 MMOL/L — SIGNIFICANT CHANGE UP (ref 5–17)
BUN SERPL-MCNC: 16 MG/DL — SIGNIFICANT CHANGE UP (ref 7–23)
CALCIUM SERPL-MCNC: 10 MG/DL — SIGNIFICANT CHANGE UP (ref 8.4–10.5)
CHLORIDE SERPL-SCNC: 104 MMOL/L — SIGNIFICANT CHANGE UP (ref 96–108)
CO2 SERPL-SCNC: 29 MMOL/L — SIGNIFICANT CHANGE UP (ref 22–31)
CREAT SERPL-MCNC: 0.71 MG/DL — SIGNIFICANT CHANGE UP (ref 0.5–1.3)
GLUCOSE SERPL-MCNC: 102 MG/DL — HIGH (ref 70–99)
HCT VFR BLD CALC: 39.8 % — SIGNIFICANT CHANGE UP (ref 34.5–45)
HGB BLD-MCNC: 12.3 G/DL — SIGNIFICANT CHANGE UP (ref 11.5–15.5)
MCHC RBC-ENTMCNC: 21.2 PG — LOW (ref 27–34)
MCHC RBC-ENTMCNC: 30.9 GM/DL — LOW (ref 32–36)
MCV RBC AUTO: 68.6 FL — LOW (ref 80–100)
NRBC # BLD: 0 /100 WBCS — SIGNIFICANT CHANGE UP (ref 0–0)
PLATELET # BLD AUTO: 206 K/UL — SIGNIFICANT CHANGE UP (ref 150–400)
POTASSIUM SERPL-MCNC: 3.1 MMOL/L — LOW (ref 3.5–5.3)
POTASSIUM SERPL-SCNC: 3.1 MMOL/L — LOW (ref 3.5–5.3)
RBC # BLD: 5.8 M/UL — HIGH (ref 3.8–5.2)
RBC # FLD: 15.5 % — HIGH (ref 10.3–14.5)
SODIUM SERPL-SCNC: 144 MMOL/L — SIGNIFICANT CHANGE UP (ref 135–145)
WBC # BLD: 6.63 K/UL — SIGNIFICANT CHANGE UP (ref 3.8–10.5)
WBC # FLD AUTO: 6.63 K/UL — SIGNIFICANT CHANGE UP (ref 3.8–10.5)

## 2020-10-15 PROCEDURE — C1894: CPT

## 2020-10-15 PROCEDURE — 93005 ELECTROCARDIOGRAM TRACING: CPT

## 2020-10-15 PROCEDURE — 93454 CORONARY ARTERY ANGIO S&I: CPT

## 2020-10-15 PROCEDURE — 99152 MOD SED SAME PHYS/QHP 5/>YRS: CPT

## 2020-10-15 PROCEDURE — 93571 IV DOP VEL&/PRESS C FLO 1ST: CPT | Mod: 26,LD

## 2020-10-15 PROCEDURE — 93454 CORONARY ARTERY ANGIO S&I: CPT | Mod: 26

## 2020-10-15 PROCEDURE — C1887: CPT

## 2020-10-15 PROCEDURE — 85027 COMPLETE CBC AUTOMATED: CPT

## 2020-10-15 PROCEDURE — 80048 BASIC METABOLIC PNL TOTAL CA: CPT

## 2020-10-15 PROCEDURE — 93010 ELECTROCARDIOGRAM REPORT: CPT

## 2020-10-15 PROCEDURE — 93571 IV DOP VEL&/PRESS C FLO 1ST: CPT | Mod: LD

## 2020-10-15 PROCEDURE — C1769: CPT

## 2020-10-15 RX ORDER — ATORVASTATIN CALCIUM 80 MG/1
1 TABLET, FILM COATED ORAL
Qty: 0 | Refills: 0 | DISCHARGE

## 2020-10-15 RX ORDER — METOPROLOL TARTRATE 50 MG
1 TABLET ORAL
Qty: 60 | Refills: 0
Start: 2020-10-15 | End: 2020-11-13

## 2020-10-15 RX ORDER — ISOSORBIDE MONONITRATE 60 MG/1
1 TABLET, EXTENDED RELEASE ORAL
Qty: 30 | Refills: 0
Start: 2020-10-15 | End: 2020-11-13

## 2020-10-15 RX ORDER — METOPROLOL TARTRATE 50 MG
1 TABLET ORAL
Qty: 0 | Refills: 0 | DISCHARGE

## 2020-10-15 RX ORDER — SODIUM CHLORIDE 9 MG/ML
3 INJECTION INTRAMUSCULAR; INTRAVENOUS; SUBCUTANEOUS EVERY 8 HOURS
Refills: 0 | Status: DISCONTINUED | OUTPATIENT
Start: 2020-10-15 | End: 2020-10-30

## 2020-10-15 RX ORDER — POTASSIUM BICARBONATE 978 MG/1
25 TABLET, EFFERVESCENT ORAL ONCE
Refills: 0 | Status: COMPLETED | OUTPATIENT
Start: 2020-10-15 | End: 2020-10-15

## 2020-10-15 RX ADMIN — POTASSIUM BICARBONATE 25 MILLIEQUIVALENT(S): 978 TABLET, EFFERVESCENT ORAL at 15:05

## 2020-10-15 NOTE — H&P CARDIOLOGY - FAMILY HISTORY
Father  Still living? Unknown  CAD (coronary artery disease), Age at diagnosis: Age Unknown     Mother  Still living? No  CAD (coronary artery disease), Age at diagnosis: Age Unknown

## 2020-10-15 NOTE — H&P CARDIOLOGY - PMH
Colon Cancer  s/p colon rection 3/25/09  Functional Murmur  childhood polo and rheumatic fever  H/O: Hypertension    Hernia, Umbilical    History of Ovarian Cyst  left  Single Renal Cyst  right  Thyroid Nodule  benign since /08

## 2020-10-15 NOTE — ASU DISCHARGE PLAN (ADULT/PEDIATRIC) - PAIN MANAGEMENT
Prescriptions electronically submitted to pharmacy from doctor's office/please  new medications and start tomorrow/Take over the counter pain medication

## 2020-10-15 NOTE — ASU DISCHARGE PLAN (ADULT/PEDIATRIC) - ASU DC SPECIAL INSTRUCTIONSFT
GASTROENTEROLOGY OFFICE NOTE  Capo Babin  [id number]  Wound Care:   the day AFTER your procedure remove bandage GENTLY, and clean using  mild soap and gentle warm, water stream, pat dry. leave OPEN to air. YOU MAY SHOWER   DO NOT apply lotions, creams, ointments, powder, perfumes to your incision site  DO NOT SOAK your site for 1 week ( no baths, no pools, no tubs, etc...)  Check  your groin and /or wrist daily. A small amount of bruising, and soreness are normal    ACTIVITY: for 24 hours   - DO NOT DRIVE  - DO NOT make any important decisions or sign legal documents   - DO NOT operate heavy machineries   - you may resume sexual activity in 48 hours, unless otherwise instructed by your cardiologist     If your procedure was done through the WRIST: for the NEXT 3DAYS:  - avoid pushing, pulling, with that affected wrist   - avoid repeated movement of that hand and wrist ( eg: typing, hammering)  - DO NOT LIFT anything more than 5 lbs       MEDICATION:   take your medications as explained ( see discharge paperwork)     Follow heart healthy diet recommended by your doctor, if you smoke STOP SMOKING ( may call [phone] for center of tobacco control if you need assistance)     CALL your doctor to make appointment in 2 WEEKS     ***CALL YOUR DOCTOR***  if you experience: fever, chills, body aches, or severe pain, swelling, redness, heat or yellow discharge at incision site  If you experience bleeding or excruciating pain at the procedural site, swelling ( golf ball size) at your procedural site  If you experience CHEST PAIN  If you experience extremity numbness, tingling, temperature change ( of your procedural site)   If you are unable to reach your doctor, you may contact:   -Cardiology Office at [facility] at [phone] or   - [facility] [phone]  - [facility] [phone]

## 2020-10-15 NOTE — ASU DISCHARGE PLAN (ADULT/PEDIATRIC) - CONDITION AT DISCHARGE
Stable High Dose Vitamin A Pregnancy And Lactation Text: High dose vitamin A therapy is contraindicated during pregnancy and breast feeding.

## 2020-10-15 NOTE — H&P CARDIOLOGY - HISTORY OF PRESENT ILLNESS
73 yo female with a Pmhx of colon cancer s/p resection 2009, HTN, HLD and GERD presents for cardiac angiogram. Patient endorses midsternal chest pressure since March 2019 associated with dyspnea with moderate exertion. Evaluated by Dr Mitali Shaw, recommended to have CT coronaries revealing severe CAD, Ca score 714 with calcification in LM extending to pLAD, moderate stenosis in pLcx and Om, diffuse dz in RCA. Referred for cardiac cath for further ischemic evaluation

## 2020-10-15 NOTE — ASU PREOP CHECKLIST - SURGICAL CONSENT
Cardiac Catheterization and Possible Intervention Cardiac Catheterization and Possible Intervention/done

## 2020-10-15 NOTE — ASU DISCHARGE PLAN (ADULT/PEDIATRIC) - CARE PROVIDER_API CALL
Mitali Shaw  CARDIOLOGY  [address] [place] [address], [address]  [place], [place] [id number]  Phone: [phone]  Fax: [phone]  Established Patient  Follow Up Time: 2 weeks

## 2020-10-26 ENCOUNTER — NON-APPOINTMENT (OUTPATIENT)
Age: 75
End: 2020-10-26

## 2020-10-26 ENCOUNTER — APPOINTMENT (OUTPATIENT)
Dept: CARDIOTHORACIC SURGERY | Facility: CLINIC | Age: 75
End: 2020-10-26
Payer: MEDICARE

## 2020-10-26 VITALS
HEART RATE: 55 BPM | BODY MASS INDEX: 27.38 KG/M2 | SYSTOLIC BLOOD PRESSURE: 133 MMHG | RESPIRATION RATE: 14 BRPM | WEIGHT: 145 LBS | OXYGEN SATURATION: 98 % | DIASTOLIC BLOOD PRESSURE: 73 MMHG | TEMPERATURE: 98.6 F | HEIGHT: 61 IN

## 2020-10-26 DIAGNOSIS — R07.9 CHEST PAIN, UNSPECIFIED: ICD-10-CM

## 2020-10-26 DIAGNOSIS — Z01.818 ENCOUNTER FOR OTHER PREPROCEDURAL EXAMINATION: ICD-10-CM

## 2020-10-26 PROCEDURE — 99205 OFFICE O/P NEW HI 60 MIN: CPT

## 2020-10-26 PROCEDURE — 99072 ADDL SUPL MATRL&STAF TM PHE: CPT

## 2020-10-26 RX ORDER — ATORVASTATIN CALCIUM 40 MG/1
40 TABLET, FILM COATED ORAL
Refills: 0 | Status: ACTIVE | COMMUNITY
Start: 2020-10-26

## 2020-10-26 RX ORDER — SIMVASTATIN 80 MG/1
TABLET, FILM COATED ORAL
Refills: 0 | Status: COMPLETED | COMMUNITY
End: 2020-10-26

## 2020-10-26 NOTE — HISTORY OF PRESENT ILLNESS
[FreeTextEntry1] : Mrs Monique is a 74 year old female former smoker (quit 39 years ago), strong family history of CAD, with a Pmhx of colon cancer s/p resection  (no chemo or radiation), HTN, HLD, murmur (hx of polio and rheumatic fever as a child) renal cyst (followed for 10 years) and GERD.  She was seen and followed by cardiologist Dr. Arvin Short and had been experiencing midsternal chest pressure/heaviness since 2019 associated with dyspnea with  Evaluated by Dr Mitali Shaw, recommended to have CT coronaries that revealed severe CAD, (Ca score 714) with calcification in LM extending to pLAD, moderate stenosis in pLcx and Om, diffuse dz in RCA. She is s/p cardiac cath with Dr. Shaw on 10/15 that revealed multivessel disease.  She presents today and reports she continues to experience chest pain/heaviness and experiences SOB with walking stairs. She also reports some occasional palpitations and does feel some dizziness not associated with her exertional dyspnea but rather when she gets up too fast.  She reports her father  years ago the day after his stress stress from an aortic aneurysm. She reports a very strong family history of heart disease and she has since changed her diet since her colon cancer in . Denies weight gain, swelling, orthopnea, fever or chills. 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 \par \par Denies DM, liver, kidney, pulmonary disease. Denies prior MI, CVA or hx of GI bleeds.

## 2020-10-26 NOTE — ASSESSMENT
[FreeTextEntry1] : Mrs Monique is a 74 year old female former smoker (quit 39 years ago), strong family history of CAD, with a Pmhx of colon cancer s/p resection  (no chemo or radiation), HTN, HLD, murmur (hx of polio and rheumatic fever as a child) renal cyst (followed for 10 years) and GERD.  She was seen and followed by cardiologist Dr. Arvin Short and had been experiencing midsternal chest pressure/heaviness since 2019 associated with dyspnea with  Evaluated by Dr Mitali Shaw, recommended to have CT coronaries that revealed severe CAD, (Ca score 714) with calcification in LM extending to pLAD, moderate stenosis in pLcx and Om, diffuse dz in RCA. She is s/p cardiac cath with Dr. Shaw on 10/15 that revealed multivessel disease.  She presents today and reports she continues to experience chest pain/heaviness and experiences SOB with walking stairs. She also reports some occasional palpitations and does feel some dizziness not associated with her exertional dyspnea but rather when she gets up too fast.  She reports her father  years ago the day after his stress stress from an aortic aneurysm. She reports a very strong family history of heart disease and she has since changed her diet since her colon cancer in . Denies weight gain, swelling, orthopnea, fever or chills. 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 \par \par Denies DM, liver, kidney, pulmonary disease. Denies prior MI, CVA or hx of GI bleeds. \par \par I reviewed the cardiac imaging, medical records and reports with patient and discussed the case. Options discussed in detail were high risk PCI with Dr. Shaw, robotic MID CAB and open sternotomy with CABG. I discussed the procedures as well as risks, benefits and alternatives to all three. Patient desires to op for Robotic MID CAB at this time. Risks included but not limited to bleeding, stroke, myocardial Infarction, kidney problems, blood transfusion, permanent pacemaker implantation, infection and death. I  quoted a <1% operative mortality and complication risks with robotic MID CAB. I also discussed the various approaches in detail.  I feel that the patient will benefit and is a candidate for a Robotic MID CAB.  All questions and concerns were addressed and patient agrees to proceed with Robotic MID CAB surgery. \par \par Plan:\par 1) Will discuss case with Dr. Georges and call to set up surgery date in early November. (Pt wishes to wait until election day).\par 2) TTE prior to surgery \par 3) PST prior to surgery\par 4) Continue Aspirin 81mg up to surgery day.

## 2020-10-26 NOTE — CONSULT LETTER
[Dear  ___] : Dear  [unfilled], [Courtesy Letter:] : I had the pleasure of seeing your patient, [unfilled], in my office today. [Sincerely,] : Sincerely, [FreeTextEntry2] : Mitali Shaw MD [FreeTextEntry3] : Houston Garcia MD\par Department of Cardiovascular &Thoracic Surgery\par \par Cardiovascular &Thoracic Surgery\par [facility].

## 2020-10-26 NOTE — REVIEW OF SYSTEMS
[Eyesight Problems] : eyesight problems [Chest Pain] : chest pain [Palpitations] : palpitations [SOB on Exertion] : shortness of breath during exertion [As Noted in HPI] : as noted in HPI [Negative] : Heme/Lymph [Lower Ext Edema] : no lower extremity edema [PND] : no PND [FreeTextEntry3] : Wears glasses

## 2020-10-26 NOTE — DATA REVIEWED
[FreeTextEntry1] : The coronary circulation is right dominant. LM: Distal left main: There was a discrete 30 % stenosis at a site with no prior intervention. There was CACHORRO grade 3 flow through the vessel (brisk flow). LAD: Ostial LAD: There was a discrete 90 % stenosis at a site with no prior intervention. There was CACHORRO grade 3 flow through the vessel (brisk flow). iFR positive. Proximal LAD: There was a tubular 40 % stenosis at a site with no prior intervention. There was CACHORRO grade 3 flow through the vessel (brisk flow). Mid LAD: There was a discrete 40 % stenosis at a site with no prior intervention. There was CACHORRO grade 3 flow through the vessel (brisk flow). D1: There was a discrete 40 % stenosis at a site with no prior intervention. There was CACHORRO grade 3 flow through the vessel (brisk flow). CX:Circumflex: Angiography showed mild atherosclerosis with no flow limiting lesions. OM1: Angiography showed mild atherosclerosis with no flow limiting lesions. RCA: RCA: Angiography showed mild atherosclerosis with no flow\par limiting lesions. RPDA: Angiography showed mild atherosclerosis with no flow limiting lesions. RPLS: Angiography showed mild atherosclerosis with no flow limiting lesions.\par

## 2020-10-26 NOTE — END OF VISIT
[FreeTextEntry3] : Written by Ruel Ayoub NP, acting as a scribe for Dr. Garcia\par “The documentation recorded by the scribe accurately reflects the service I personally performed and the decisions made by me.” Signature Houston Garcia MD.

## 2020-10-26 NOTE — PHYSICAL EXAM
[General Appearance - Alert] : alert [General Appearance - In No Acute Distress] : in no acute distress [General Appearance - Well Nourished] : well nourished [General Appearance - Well Developed] : well developed [Sclera] : the sclera and conjunctiva were normal [PERRL With Normal Accommodation] : pupils were equal in size, round, and reactive to light [Outer Ear] : the ears and nose were normal in appearance [Jugular Venous Distention Increased] : there was no jugular-venous distention [] : no respiratory distress [Respiration, Rhythm And Depth] : normal respiratory rhythm and effort [Exaggerated Use Of Accessory Muscles For Inspiration] : no accessory muscle use [Auscultation Breath Sounds / Voice Sounds] : lungs were clear to auscultation bilaterally [Apical Impulse] : the apical impulse was normal [Heart Rate And Rhythm] : heart rate was normal and rhythm regular [Heart Sounds] : normal S1 and S2 [Murmurs] : no murmurs [Examination Of The Chest] : the chest was normal in appearance [Bowel Sounds] : normal bowel sounds [Abdomen Soft] : soft [Abdomen Tenderness] : non-tender [No CVA Tenderness] : no ~M costovertebral angle tenderness [Involuntary Movements] : no involuntary movements were seen [Skin Color & Pigmentation] : normal skin color and pigmentation [Skin Turgor] : normal skin turgor [No Focal Deficits] : no focal deficits [Oriented To Time, Place, And Person] : oriented to person, place, and time [Impaired Insight] : insight and judgment were intact [Affect] : the affect was normal [Mood] : the mood was normal [Right Carotid Bruit] : no bruit heard over the right carotid [Left Carotid Bruit] : no bruit heard over the left carotid [FreeTextEntry1] : Deferred

## 2020-11-02 ENCOUNTER — OUTPATIENT (OUTPATIENT)
Dept: OUTPATIENT SERVICES | Facility: HOSPITAL | Age: 75
LOS: 1 days | End: 2020-11-02
Payer: COMMERCIAL

## 2020-11-02 ENCOUNTER — RESULT REVIEW (OUTPATIENT)
Age: 75
End: 2020-11-02

## 2020-11-02 VITALS
DIASTOLIC BLOOD PRESSURE: 76 MMHG | HEART RATE: 59 BPM | HEIGHT: 61 IN | WEIGHT: 143.96 LBS | OXYGEN SATURATION: 99 % | RESPIRATION RATE: 16 BRPM | SYSTOLIC BLOOD PRESSURE: 135 MMHG | TEMPERATURE: 98 F

## 2020-11-02 DIAGNOSIS — I25.10 ATHEROSCLEROTIC HEART DISEASE OF NATIVE CORONARY ARTERY WITHOUT ANGINA PECTORIS: ICD-10-CM

## 2020-11-02 DIAGNOSIS — Z01.818 ENCOUNTER FOR OTHER PREPROCEDURAL EXAMINATION: ICD-10-CM

## 2020-11-02 DIAGNOSIS — Z29.9 ENCOUNTER FOR PROPHYLACTIC MEASURES, UNSPECIFIED: ICD-10-CM

## 2020-11-02 LAB
A1C WITH ESTIMATED AVERAGE GLUCOSE RESULT: 6 % — HIGH (ref 4–5.6)
ANION GAP SERPL CALC-SCNC: 11 MMOL/L — SIGNIFICANT CHANGE UP (ref 5–17)
BLD GP AB SCN SERPL QL: NEGATIVE — SIGNIFICANT CHANGE UP
BUN SERPL-MCNC: 17 MG/DL — SIGNIFICANT CHANGE UP (ref 7–23)
CALCIUM SERPL-MCNC: 9.8 MG/DL — SIGNIFICANT CHANGE UP (ref 8.4–10.5)
CHLORIDE SERPL-SCNC: 102 MMOL/L — SIGNIFICANT CHANGE UP (ref 96–108)
CO2 SERPL-SCNC: 28 MMOL/L — SIGNIFICANT CHANGE UP (ref 22–31)
CREAT SERPL-MCNC: 0.74 MG/DL — SIGNIFICANT CHANGE UP (ref 0.5–1.3)
ESTIMATED AVERAGE GLUCOSE: 126 MG/DL — HIGH (ref 68–114)
GLUCOSE SERPL-MCNC: 91 MG/DL — SIGNIFICANT CHANGE UP (ref 70–99)
HCT VFR BLD CALC: 40.6 % — SIGNIFICANT CHANGE UP (ref 34.5–45)
HGB BLD-MCNC: 12.3 G/DL — SIGNIFICANT CHANGE UP (ref 11.5–15.5)
MCHC RBC-ENTMCNC: 21.2 PG — LOW (ref 27–34)
MCHC RBC-ENTMCNC: 30.3 GM/DL — LOW (ref 32–36)
MCV RBC AUTO: 70 FL — LOW (ref 80–100)
MRSA PCR RESULT.: SIGNIFICANT CHANGE UP
NRBC # BLD: 0 /100 WBCS — SIGNIFICANT CHANGE UP (ref 0–0)
PLATELET # BLD AUTO: 233 K/UL — SIGNIFICANT CHANGE UP (ref 150–400)
POTASSIUM SERPL-MCNC: 3.4 MMOL/L — LOW (ref 3.5–5.3)
POTASSIUM SERPL-SCNC: 3.4 MMOL/L — LOW (ref 3.5–5.3)
RBC # BLD: 5.8 M/UL — HIGH (ref 3.8–5.2)
RBC # FLD: 16.3 % — HIGH (ref 10.3–14.5)
RH IG SCN BLD-IMP: POSITIVE — SIGNIFICANT CHANGE UP
S AUREUS DNA NOSE QL NAA+PROBE: SIGNIFICANT CHANGE UP
SARS-COV-2 RNA SPEC QL NAA+PROBE: SIGNIFICANT CHANGE UP
SODIUM SERPL-SCNC: 141 MMOL/L — SIGNIFICANT CHANGE UP (ref 135–145)
WBC # BLD: 7.16 K/UL — SIGNIFICANT CHANGE UP (ref 3.8–10.5)
WBC # FLD AUTO: 7.16 K/UL — SIGNIFICANT CHANGE UP (ref 3.8–10.5)

## 2020-11-02 PROCEDURE — 71045 X-RAY EXAM CHEST 1 VIEW: CPT | Mod: 26

## 2020-11-02 PROCEDURE — 86901 BLOOD TYPING SEROLOGIC RH(D): CPT

## 2020-11-02 PROCEDURE — 80048 BASIC METABOLIC PNL TOTAL CA: CPT

## 2020-11-02 PROCEDURE — 71045 X-RAY EXAM CHEST 1 VIEW: CPT

## 2020-11-02 PROCEDURE — 86900 BLOOD TYPING SEROLOGIC ABO: CPT

## 2020-11-02 PROCEDURE — G0463: CPT

## 2020-11-02 PROCEDURE — 87641 MR-STAPH DNA AMP PROBE: CPT

## 2020-11-02 PROCEDURE — U0003: CPT

## 2020-11-02 PROCEDURE — 86850 RBC ANTIBODY SCREEN: CPT

## 2020-11-02 PROCEDURE — 83036 HEMOGLOBIN GLYCOSYLATED A1C: CPT

## 2020-11-02 PROCEDURE — 87640 STAPH A DNA AMP PROBE: CPT

## 2020-11-02 PROCEDURE — 85027 COMPLETE CBC AUTOMATED: CPT

## 2020-11-02 RX ORDER — LIDOCAINE HCL 20 MG/ML
0.2 VIAL (ML) INJECTION ONCE
Refills: 0 | Status: DISCONTINUED | OUTPATIENT
Start: 2020-11-04 | End: 2020-11-04

## 2020-11-02 RX ORDER — CHLORHEXIDINE GLUCONATE 213 G/1000ML
1 SOLUTION TOPICAL ONCE
Refills: 0 | Status: DISCONTINUED | OUTPATIENT
Start: 2020-11-04 | End: 2020-11-04

## 2020-11-02 NOTE — H&P PST ADULT - NSICDXPASTSURGICALHX_GEN_ALL_CORE_FT
PAST SURGICAL HISTORY:  Right Facial Numbness s/p repair of right facial muscle 30 yrs ago    S/P Colon Resection     S/P Colonoscopy with Polypectomy     S/P [facility]one Biopsy of Cervix due to abnormal PAP 1994 and 2008    S/P Endoscopy     S/P Tubal Ligation

## 2020-11-02 NOTE — H&P PST ADULT - NSICDXPROBLEM_GEN_ALL_CORE_FT
PROBLEM DIAGNOSES  Problem: Coronary artery disease  Assessment and Plan: CABG x 3   PST Intructions provided surgical scrub provided   PT verbalized understand  CBC, BMP, HA1C, MRSA, Covid, T&S  Chest X ray today   Carotid Duplex done outside-showed mild carotid plaque b/l (see cardiology note on chart)   F/S on admission      Problem: Need for prophylactic measure  Assessment and Plan: The Caprini score indicates that this patient is at high risk for a VTE event (score 6 or greater). Surgical patients in this group will benefit from both pharmacologic prophylaxis and intermittent compression devices.  The surgical team will determine the balance between VTE risk and bleeding risk, and other clinical considerations         PROBLEM DIAGNOSES  Problem: Coronary artery disease  Assessment and Plan: CABG x 3   PST Intructions provided surgical scrub provided   PT verbalized understand  CBC, BMP, HA1C, MRSA, Covid, T&S done in PST   Chest X ray today   Carotid Duplex done outside-showed mild carotid plaque b/l (see cardiology note on chart)   F/S on admission      Problem: Need for prophylactic measure  Assessment and Plan: The Caprini score indicates that this patient is at high risk for a VTE event (score 6 or greater). Surgical patients in this group will benefit from both pharmacologic prophylaxis and intermittent compression devices.  The surgical team will determine the balance between VTE risk and bleeding risk, and other clinical considerations

## 2020-11-02 NOTE — H&P PST ADULT - ASSESSMENT
NICOLASAI VTE 2.0 SCORE [CLOT updated 2019]    AGE RELATED RISK FACTORS                                                       MOBILITY RELATED FACTORS  [ ] Age 41-60 years                                            (1 Point)                    [ ] Bed rest                                                        (1 Point)  [ ] Age: 61-74 years                                           (2 Points)                  [ ] Plaster cast                                                   (2 Points)  [x] Age= 75 years                                              (3 Points)                    [ ] Bed bound for more than 72 hours                 (2 Points)    DISEASE RELATED RISK FACTORS                                               GENDER SPECIFIC FACTORS  [ ] Edema in the lower extremities                       (1 Point)              [ ] Pregnancy                                                     (1 Point)  [ ] Varicose veins                                               (1 Point)                     [ ] Post-partum < 6 weeks                                   (1 Point)             [x ] BMI > 25 Kg/m2                                            (1 Point)                     [ ] Hormonal therapy  or oral contraception          (1 Point)                 [ ] Sepsis (in the previous month)                        (1 Point)               [ ] History of pregnancy complications                 (1 point)  [ ] Pneumonia or serious lung disease                                               [ ] Unexplained or recurrent                     (1 Point)           (in the previous month)                               (1 Point)  [ ] Abnormal pulmonary function test                     (1 Point)                 SURGERY RELATED RISK FACTORS  [ ] Acute myocardial infarction                              (1 Point)               [ ]  Section                                             (1 Point)  [ ] Congestive heart failure (in the previous month)  (1 Point)      [ ] Minor surgery                                                  (1 Point)   [ ] Inflammatory bowel disease                             (1 Point)               [ ] Arthroscopic surgery                                        (2 Points)  [ ] Central venous access                                      (2 Points)                [ x] General surgery lasting more than 45 minutes (2 points)  [ ] Malignancy- Present or previous                   (2 Points)                [ ] Elective arthroplasty                                         (5 points)    [ ] Stroke (in the previous month)                          (5 Points)                                                                                                                                                           HEMATOLOGY RELATED FACTORS                                                 TRAUMA RELATED RISK FACTORS  [ ] Prior episodes of VTE                                     (3 Points)                [ ] Fracture of the hip, pelvis, or leg                       (5 Points)  [ ] Positive family history for VTE                         (3 Points)             [ ] Acute spinal cord injury (in the previous month)  (5 Points)  [ ] Prothrombin 34424 A                                     (3 Points)               [ ] Paralysis  (less than 1 month)                             (5 Points)  [ ] Factor V Leiden                                             (3 Points)                  [ ] Multiple Trauma within 1 month                        (5 Points)  [ ] Lupus anticoagulants                                     (3 Points)                                                           [ ] Anticardiolipin antibodies                               (3 Points)                                                       [ ] High homocysteine in the blood                      (3 Points)                                             [ ] Other congenital or acquired thrombophilia      (3 Points)                                                [ ] Heparin induced thrombocytopenia                  (3 Points)                                     Total Score [       6   ]

## 2020-11-02 NOTE — H&P PST ADULT - NSICDXPASTMEDICALHX_GEN_ALL_CORE_FT
PAST MEDICAL HISTORY:  Colon Cancer s/p colon rection 3/25/09 no recurrence to date, did not require chemo nor radiation    Former smoker during teenage years    Functional Murmur childhood polio and rheumatic fever    H/O: Hypertension     Hernia, Umbilical surgically repaired 2009 during colon resection 2009 amd again repaired in 2010 with mesh    History of Ovarian Cyst left    Single Renal Cyst right 10cm (per report)    Thyroid Nodule benign since 08

## 2020-11-02 NOTE — H&P PST ADULT - NSICDXFAMILYHX_GEN_ALL_CORE_FT
FAMILY HISTORY:  Father  Still living? Unknown  CAD (coronary artery disease), Age at diagnosis: Age Unknown    Mother  Still living? No  CAD (coronary artery disease), Age at diagnosis: Age Unknown

## 2020-11-02 NOTE — H&P PST ADULT - HISTORY OF PRESENT ILLNESS
75 y.o Female presents to PSTs for Cardiac Bypass x 3 V          female with h/o colon rection due to colon cancer patient now presents for Incisional Hernia Repair with Mesh. 75 y.o Female with abnormal cath presents to PST for 3 Vessel CABG on 11/4/2020 with Dr. Trinh.          female with h/o colon rection due to colon cancer patient now presents for Incisional Hernia Repair with Mesh. 75 y.o Female with HTN, HLD, abnormal cath ( severe ostial LAD)   presents to PST for 3 Vessel CABG on 11/4/2020 with Dr. Trinh. Patient c/o SOB worsen on exertion, occasional palpitations, denies CP, no fever, chills, no acute complaints. Ambulating without assistance.   Covid PCR today in PST.

## 2020-11-03 VITALS — WEIGHT: 143.96 LBS | HEIGHT: 61 IN

## 2020-11-03 NOTE — PRE-ANESTHESIA EVALUATION ADULT - NSANTHAIRWAYFT_ENT_ALL_CORE
MP 2, mouth opening > 3 cm, TM distance > 6 cm, normal neck ROM MP 3, mouth opening > 3 cm, TM distance > 6 cm, normal neck ROM

## 2020-11-04 ENCOUNTER — APPOINTMENT (OUTPATIENT)
Dept: CARDIOTHORACIC SURGERY | Facility: HOSPITAL | Age: 75
End: 2020-11-04

## 2020-11-04 ENCOUNTER — INPATIENT (INPATIENT)
Facility: HOSPITAL | Age: 75
LOS: 3 days | Discharge: HOME HEALTH | DRG: 236 | End: 2020-11-08
Attending: THORACIC SURGERY (CARDIOTHORACIC VASCULAR SURGERY) | Admitting: THORACIC SURGERY (CARDIOTHORACIC VASCULAR SURGERY)
Payer: COMMERCIAL

## 2020-11-04 VITALS
HEART RATE: 54 BPM | WEIGHT: 146.39 LBS | DIASTOLIC BLOOD PRESSURE: 73 MMHG | SYSTOLIC BLOOD PRESSURE: 124 MMHG | OXYGEN SATURATION: 96 % | TEMPERATURE: 98 F | HEIGHT: 61 IN | RESPIRATION RATE: 18 BRPM

## 2020-11-04 DIAGNOSIS — I25.10 ATHEROSCLEROTIC HEART DISEASE OF NATIVE CORONARY ARTERY WITHOUT ANGINA PECTORIS: ICD-10-CM

## 2020-11-04 PROBLEM — Z87.891 PERSONAL HISTORY OF NICOTINE DEPENDENCE: Chronic | Status: ACTIVE | Noted: 2020-10-15

## 2020-11-04 LAB
ALBUMIN SERPL ELPH-MCNC: 3 G/DL — LOW (ref 3.3–5)
ALP SERPL-CCNC: 33 U/L — LOW (ref 40–120)
ALT FLD-CCNC: 9 U/L — LOW (ref 10–45)
ANION GAP SERPL CALC-SCNC: 12 MMOL/L — SIGNIFICANT CHANGE UP (ref 5–17)
ANISOCYTOSIS BLD QL: SLIGHT — SIGNIFICANT CHANGE UP
APTT BLD: 25.4 SEC — LOW (ref 27.5–35.5)
AST SERPL-CCNC: 27 U/L — SIGNIFICANT CHANGE UP (ref 10–40)
BASE EXCESS BLDV CALC-SCNC: -0.4 MMOL/L — SIGNIFICANT CHANGE UP (ref -2–2)
BASE EXCESS BLDV CALC-SCNC: 5 MMOL/L — HIGH (ref -2–2)
BASE EXCESS BLDV CALC-SCNC: 5.6 MMOL/L — HIGH (ref -2–2)
BASE EXCESS BLDV CALC-SCNC: 5.8 MMOL/L — HIGH (ref -2–2)
BASO STIPL BLD QL SMEAR: PRESENT — SIGNIFICANT CHANGE UP
BASOPHILS # BLD AUTO: 0 K/UL — SIGNIFICANT CHANGE UP (ref 0–0.2)
BASOPHILS NFR BLD AUTO: 0 % — SIGNIFICANT CHANGE UP (ref 0–2)
BILIRUB SERPL-MCNC: 1.3 MG/DL — HIGH (ref 0.2–1.2)
BLOOD GAS VENOUS - CREATININE: SIGNIFICANT CHANGE UP MG/DL (ref 0.5–1.3)
BUN SERPL-MCNC: 13 MG/DL — SIGNIFICANT CHANGE UP (ref 7–23)
CA-I SERPL-SCNC: 0.97 MMOL/L — LOW (ref 1.12–1.3)
CA-I SERPL-SCNC: 0.97 MMOL/L — LOW (ref 1.12–1.3)
CA-I SERPL-SCNC: 0.98 MMOL/L — LOW (ref 1.12–1.3)
CA-I SERPL-SCNC: 0.99 MMOL/L — LOW (ref 1.12–1.3)
CALCIUM SERPL-MCNC: 8.1 MG/DL — LOW (ref 8.4–10.5)
CHLORIDE BLDV-SCNC: SIGNIFICANT CHANGE UP MMOL/L (ref 96–108)
CHLORIDE SERPL-SCNC: 105 MMOL/L — SIGNIFICANT CHANGE UP (ref 96–108)
CK MB BLD-MCNC: 5.6 % — HIGH (ref 0–3.5)
CK MB CFR SERPL CALC: 20.9 NG/ML — HIGH (ref 0–3.8)
CK SERPL-CCNC: 370 U/L — HIGH (ref 25–170)
CO2 SERPL-SCNC: 24 MMOL/L — SIGNIFICANT CHANGE UP (ref 22–31)
CREAT SERPL-MCNC: 0.64 MG/DL — SIGNIFICANT CHANGE UP (ref 0.5–1.3)
DACRYOCYTES BLD QL SMEAR: SLIGHT — SIGNIFICANT CHANGE UP
ELLIPTOCYTES BLD QL SMEAR: SLIGHT — SIGNIFICANT CHANGE UP
EOSINOPHIL # BLD AUTO: 0 K/UL — SIGNIFICANT CHANGE UP (ref 0–0.5)
EOSINOPHIL NFR BLD AUTO: 0 % — SIGNIFICANT CHANGE UP (ref 0–6)
FIBRINOGEN PPP-MCNC: 280 MG/DL — LOW (ref 290–520)
GAS PNL BLDA: SIGNIFICANT CHANGE UP
GAS PNL BLDV: 136 MMOL/L — SIGNIFICANT CHANGE UP (ref 135–145)
GAS PNL BLDV: 137 MMOL/L — SIGNIFICANT CHANGE UP (ref 135–145)
GAS PNL BLDV: 137 MMOL/L — SIGNIFICANT CHANGE UP (ref 135–145)
GAS PNL BLDV: 138 MMOL/L — SIGNIFICANT CHANGE UP (ref 135–145)
GAS PNL BLDV: SIGNIFICANT CHANGE UP
GLUCOSE BLDC GLUCOMTR-MCNC: 116 MG/DL — HIGH (ref 70–99)
GLUCOSE BLDC GLUCOMTR-MCNC: 117 MG/DL — HIGH (ref 70–99)
GLUCOSE BLDV-MCNC: 106 MG/DL — HIGH (ref 70–99)
GLUCOSE BLDV-MCNC: 213 MG/DL — HIGH (ref 70–99)
GLUCOSE BLDV-MCNC: 220 MG/DL — HIGH (ref 70–99)
GLUCOSE BLDV-MCNC: 239 MG/DL — HIGH (ref 70–99)
GLUCOSE SERPL-MCNC: 102 MG/DL — HIGH (ref 70–99)
HCO3 BLDV-SCNC: 24 MMOL/L — SIGNIFICANT CHANGE UP (ref 21–29)
HCO3 BLDV-SCNC: 30 MMOL/L — HIGH (ref 21–29)
HCT VFR BLD CALC: 27.6 % — LOW (ref 34.5–45)
HCT VFR BLDA CALC: 22 % — CRITICAL LOW (ref 39–50)
HCT VFR BLDA CALC: 22 % — CRITICAL LOW (ref 39–50)
HCT VFR BLDA CALC: 23 % — LOW (ref 39–50)
HCT VFR BLDA CALC: 23 % — LOW (ref 39–50)
HGB BLD CALC-MCNC: 7.1 G/DL — LOW (ref 11.5–15.5)
HGB BLD CALC-MCNC: 7.1 G/DL — LOW (ref 11.5–15.5)
HGB BLD CALC-MCNC: 7.3 G/DL — LOW (ref 11.5–15.5)
HGB BLD CALC-MCNC: 7.3 G/DL — LOW (ref 11.5–15.5)
HGB BLD-MCNC: 8.6 G/DL — LOW (ref 11.5–15.5)
HOROWITZ INDEX BLDV+IHG-RTO: 0 — SIGNIFICANT CHANGE UP
HYPOCHROMIA BLD QL: SLIGHT — SIGNIFICANT CHANGE UP
INR BLD: 1.41 RATIO — HIGH (ref 0.88–1.16)
LACTATE BLDV-MCNC: 1.3 MMOL/L — SIGNIFICANT CHANGE UP (ref 0.7–2)
LACTATE BLDV-MCNC: 2.1 MMOL/L — HIGH (ref 0.7–2)
LACTATE BLDV-MCNC: 2.1 MMOL/L — HIGH (ref 0.7–2)
LACTATE BLDV-MCNC: 2.4 MMOL/L — HIGH (ref 0.7–2)
LYMPHOCYTES # BLD AUTO: 1.02 K/UL — SIGNIFICANT CHANGE UP (ref 1–3.3)
LYMPHOCYTES # BLD AUTO: 8 % — LOW (ref 13–44)
MAGNESIUM SERPL-MCNC: 2.1 MG/DL — SIGNIFICANT CHANGE UP (ref 1.6–2.6)
MANUAL SMEAR VERIFICATION: SIGNIFICANT CHANGE UP
MCHC RBC-ENTMCNC: 22.1 PG — LOW (ref 27–34)
MCHC RBC-ENTMCNC: 31.2 GM/DL — LOW (ref 32–36)
MCV RBC AUTO: 70.8 FL — LOW (ref 80–100)
MICROCYTES BLD QL: SIGNIFICANT CHANGE UP
MONOCYTES # BLD AUTO: 0.64 K/UL — SIGNIFICANT CHANGE UP (ref 0–0.9)
MONOCYTES NFR BLD AUTO: 5 % — SIGNIFICANT CHANGE UP (ref 2–14)
NEUTROPHILS # BLD AUTO: 10.57 K/UL — HIGH (ref 1.8–7.4)
NEUTROPHILS NFR BLD AUTO: 75 % — SIGNIFICANT CHANGE UP (ref 43–77)
NEUTS BAND # BLD: 8 % — SIGNIFICANT CHANGE UP (ref 0–8)
NRBC # BLD: 0 /100 — SIGNIFICANT CHANGE UP (ref 0–0)
PCO2 BLDV: 40 MMHG — SIGNIFICANT CHANGE UP (ref 35–50)
PCO2 BLDV: 44 MMHG — SIGNIFICANT CHANGE UP (ref 35–50)
PCO2 BLDV: 46 MMHG — SIGNIFICANT CHANGE UP (ref 35–50)
PCO2 BLDV: 47 MMHG — SIGNIFICANT CHANGE UP (ref 35–50)
PH BLDV: 7.39 — SIGNIFICANT CHANGE UP (ref 7.35–7.45)
PH BLDV: 7.42 — SIGNIFICANT CHANGE UP (ref 7.35–7.45)
PH BLDV: 7.43 — SIGNIFICANT CHANGE UP (ref 7.35–7.45)
PH BLDV: 7.44 — SIGNIFICANT CHANGE UP (ref 7.35–7.45)
PHOSPHATE SERPL-MCNC: 1.8 MG/DL — LOW (ref 2.5–4.5)
PLAT MORPH BLD: NORMAL — SIGNIFICANT CHANGE UP
PLATELET # BLD AUTO: 128 K/UL — LOW (ref 150–400)
PO2 BLDV: 54 MMHG — HIGH (ref 25–45)
PO2 BLDV: 63 MMHG — HIGH (ref 25–45)
PO2 BLDV: 71 MMHG — HIGH (ref 25–45)
PO2 BLDV: 74 MMHG — HIGH (ref 25–45)
POIKILOCYTOSIS BLD QL AUTO: SIGNIFICANT CHANGE UP
POLYCHROMASIA BLD QL SMEAR: SLIGHT — SIGNIFICANT CHANGE UP
POTASSIUM BLDV-SCNC: 3.5 MMOL/L — SIGNIFICANT CHANGE UP (ref 3.5–5)
POTASSIUM BLDV-SCNC: 3.9 MMOL/L — SIGNIFICANT CHANGE UP (ref 3.5–5)
POTASSIUM BLDV-SCNC: 4.3 MMOL/L — SIGNIFICANT CHANGE UP (ref 3.5–5)
POTASSIUM BLDV-SCNC: 4.6 MMOL/L — SIGNIFICANT CHANGE UP (ref 3.5–5)
POTASSIUM SERPL-MCNC: 3.7 MMOL/L — SIGNIFICANT CHANGE UP (ref 3.5–5.3)
POTASSIUM SERPL-SCNC: 3.7 MMOL/L — SIGNIFICANT CHANGE UP (ref 3.5–5.3)
PROT SERPL-MCNC: 4 G/DL — LOW (ref 6–8.3)
PROTHROM AB SERPL-ACNC: 16.6 SEC — HIGH (ref 10.6–13.6)
RBC # BLD: 3.9 M/UL — SIGNIFICANT CHANGE UP (ref 3.8–5.2)
RBC # FLD: 16.5 % — HIGH (ref 10.3–14.5)
RBC BLD AUTO: ABNORMAL
SAO2 % BLDV: 86 % — SIGNIFICANT CHANGE UP (ref 67–88)
SAO2 % BLDV: 91 % — HIGH (ref 67–88)
SAO2 % BLDV: 94 % — HIGH (ref 67–88)
SAO2 % BLDV: 96 % — HIGH (ref 67–88)
SODIUM SERPL-SCNC: 141 MMOL/L — SIGNIFICANT CHANGE UP (ref 135–145)
TROPONIN T, HIGH SENSITIVITY RESULT: 279 NG/L — HIGH (ref 0–51)
VARIANT LYMPHS # BLD: 4 % — SIGNIFICANT CHANGE UP (ref 0–6)
WBC # BLD: 12.74 K/UL — HIGH (ref 3.8–10.5)
WBC # FLD AUTO: 12.74 K/UL — HIGH (ref 3.8–10.5)

## 2020-11-04 PROCEDURE — 33533 CABG ARTERIAL SINGLE: CPT

## 2020-11-04 PROCEDURE — 33518 CABG ARTERY-VEIN TWO: CPT

## 2020-11-04 PROCEDURE — 93010 ELECTROCARDIOGRAM REPORT: CPT

## 2020-11-04 PROCEDURE — 71045 X-RAY EXAM CHEST 1 VIEW: CPT | Mod: 26

## 2020-11-04 RX ORDER — ONDANSETRON 8 MG/1
4 TABLET, FILM COATED ORAL ONCE
Refills: 0 | Status: COMPLETED | OUTPATIENT
Start: 2020-11-04 | End: 2020-11-04

## 2020-11-04 RX ORDER — SODIUM CHLORIDE 9 MG/ML
1000 INJECTION INTRAMUSCULAR; INTRAVENOUS; SUBCUTANEOUS
Refills: 0 | Status: DISCONTINUED | OUTPATIENT
Start: 2020-11-04 | End: 2020-11-08

## 2020-11-04 RX ORDER — ALBUMIN HUMAN 25 %
250 VIAL (ML) INTRAVENOUS ONCE
Refills: 0 | Status: COMPLETED | OUTPATIENT
Start: 2020-11-04 | End: 2020-11-04

## 2020-11-04 RX ORDER — POTASSIUM CHLORIDE 20 MEQ
10 PACKET (EA) ORAL
Refills: 0 | Status: COMPLETED | OUTPATIENT
Start: 2020-11-04 | End: 2020-11-04

## 2020-11-04 RX ORDER — DEXTROSE 50 % IN WATER 50 %
50 SYRINGE (ML) INTRAVENOUS
Refills: 0 | Status: DISCONTINUED | OUTPATIENT
Start: 2020-11-04 | End: 2020-11-05

## 2020-11-04 RX ORDER — FENTANYL CITRATE 50 UG/ML
50 INJECTION INTRAVENOUS ONCE
Refills: 0 | Status: DISCONTINUED | OUTPATIENT
Start: 2020-11-04 | End: 2020-11-04

## 2020-11-04 RX ORDER — CHLORHEXIDINE GLUCONATE 213 G/1000ML
1 SOLUTION TOPICAL
Refills: 0 | Status: DISCONTINUED | OUTPATIENT
Start: 2020-11-04 | End: 2020-11-08

## 2020-11-04 RX ORDER — POTASSIUM CHLORIDE 20 MEQ
10 PACKET (EA) ORAL
Refills: 0 | Status: DISCONTINUED | OUTPATIENT
Start: 2020-11-04 | End: 2020-11-05

## 2020-11-04 RX ORDER — CEFUROXIME AXETIL 250 MG
1500 TABLET ORAL EVERY 8 HOURS
Refills: 0 | Status: COMPLETED | OUTPATIENT
Start: 2020-11-04 | End: 2020-11-06

## 2020-11-04 RX ORDER — ASPIRIN/CALCIUM CARB/MAGNESIUM 324 MG
300 TABLET ORAL ONCE
Refills: 0 | Status: COMPLETED | OUTPATIENT
Start: 2020-11-04 | End: 2020-11-04

## 2020-11-04 RX ORDER — ACETAMINOPHEN 500 MG
1000 TABLET ORAL ONCE
Refills: 0 | Status: COMPLETED | OUTPATIENT
Start: 2020-11-04 | End: 2020-11-04

## 2020-11-04 RX ORDER — CEFUROXIME AXETIL 250 MG
1500 TABLET ORAL ONCE
Refills: 0 | Status: DISCONTINUED | OUTPATIENT
Start: 2020-11-04 | End: 2020-11-04

## 2020-11-04 RX ORDER — NICARDIPINE HYDROCHLORIDE 30 MG/1
3 CAPSULE, EXTENDED RELEASE ORAL
Qty: 40 | Refills: 0 | Status: DISCONTINUED | OUTPATIENT
Start: 2020-11-04 | End: 2020-11-05

## 2020-11-04 RX ORDER — POLYETHYLENE GLYCOL 3350 17 G/17G
17 POWDER, FOR SOLUTION ORAL DAILY
Refills: 0 | Status: DISCONTINUED | OUTPATIENT
Start: 2020-11-04 | End: 2020-11-08

## 2020-11-04 RX ORDER — INSULIN HUMAN 100 [IU]/ML
3 INJECTION, SOLUTION SUBCUTANEOUS
Qty: 100 | Refills: 0 | Status: DISCONTINUED | OUTPATIENT
Start: 2020-11-04 | End: 2020-11-05

## 2020-11-04 RX ORDER — CHLORHEXIDINE GLUCONATE 213 G/1000ML
5 SOLUTION TOPICAL
Refills: 0 | Status: DISCONTINUED | OUTPATIENT
Start: 2020-11-04 | End: 2020-11-05

## 2020-11-04 RX ORDER — ASPIRIN/CALCIUM CARB/MAGNESIUM 324 MG
325 TABLET ORAL DAILY
Refills: 0 | Status: DISCONTINUED | OUTPATIENT
Start: 2020-11-04 | End: 2020-11-08

## 2020-11-04 RX ORDER — PANTOPRAZOLE SODIUM 20 MG/1
40 TABLET, DELAYED RELEASE ORAL DAILY
Refills: 0 | Status: DISCONTINUED | OUTPATIENT
Start: 2020-11-04 | End: 2020-11-05

## 2020-11-04 RX ORDER — MEPERIDINE HYDROCHLORIDE 50 MG/ML
25 INJECTION INTRAMUSCULAR; INTRAVENOUS; SUBCUTANEOUS ONCE
Refills: 0 | Status: DISCONTINUED | OUTPATIENT
Start: 2020-11-04 | End: 2020-11-05

## 2020-11-04 RX ORDER — CALCIUM GLUCONATE 100 MG/ML
1 VIAL (ML) INTRAVENOUS ONCE
Refills: 0 | Status: COMPLETED | OUTPATIENT
Start: 2020-11-04 | End: 2020-11-04

## 2020-11-04 RX ORDER — NOREPINEPHRINE BITARTRATE/D5W 8 MG/250ML
0.1 PLASTIC BAG, INJECTION (ML) INTRAVENOUS
Qty: 8 | Refills: 0 | Status: DISCONTINUED | OUTPATIENT
Start: 2020-11-04 | End: 2020-11-05

## 2020-11-04 RX ORDER — METOCLOPRAMIDE HCL 10 MG
10 TABLET ORAL EVERY 8 HOURS
Refills: 0 | Status: COMPLETED | OUTPATIENT
Start: 2020-11-04 | End: 2020-11-06

## 2020-11-04 RX ORDER — DOBUTAMINE HCL 250MG/20ML
1.25 VIAL (ML) INTRAVENOUS
Qty: 500 | Refills: 0 | Status: DISCONTINUED | OUTPATIENT
Start: 2020-11-04 | End: 2020-11-05

## 2020-11-04 RX ORDER — HYDROMORPHONE HYDROCHLORIDE 2 MG/ML
0.5 INJECTION INTRAMUSCULAR; INTRAVENOUS; SUBCUTANEOUS ONCE
Refills: 0 | Status: DISCONTINUED | OUTPATIENT
Start: 2020-11-04 | End: 2020-11-04

## 2020-11-04 RX ORDER — METOPROLOL TARTRATE 50 MG
0 TABLET ORAL
Qty: 0 | Refills: 0 | DISCHARGE

## 2020-11-04 RX ORDER — SODIUM CHLORIDE 9 MG/ML
3 INJECTION INTRAMUSCULAR; INTRAVENOUS; SUBCUTANEOUS EVERY 8 HOURS
Refills: 0 | Status: DISCONTINUED | OUTPATIENT
Start: 2020-11-04 | End: 2020-11-04

## 2020-11-04 RX ORDER — DEXTROSE 50 % IN WATER 50 %
25 SYRINGE (ML) INTRAVENOUS
Refills: 0 | Status: DISCONTINUED | OUTPATIENT
Start: 2020-11-04 | End: 2020-11-05

## 2020-11-04 RX ORDER — CHLORHEXIDINE GLUCONATE 213 G/1000ML
15 SOLUTION TOPICAL EVERY 12 HOURS
Refills: 0 | Status: DISCONTINUED | OUTPATIENT
Start: 2020-11-04 | End: 2020-11-04

## 2020-11-04 RX ORDER — ASPIRIN/CALCIUM CARB/MAGNESIUM 324 MG
300 TABLET ORAL ONCE
Refills: 0 | Status: COMPLETED | OUTPATIENT
Start: 2020-11-04

## 2020-11-04 RX ORDER — METOCLOPRAMIDE HCL 10 MG
10 TABLET ORAL EVERY 8 HOURS
Refills: 0 | Status: DISCONTINUED | OUTPATIENT
Start: 2020-11-04 | End: 2020-11-04

## 2020-11-04 RX ORDER — SODIUM CHLORIDE 9 MG/ML
500 INJECTION, SOLUTION INTRAVENOUS ONCE
Refills: 0 | Status: COMPLETED | OUTPATIENT
Start: 2020-11-04 | End: 2020-11-04

## 2020-11-04 RX ADMIN — Medication 100 MILLIGRAM(S): at 16:20

## 2020-11-04 RX ADMIN — Medication 100 MILLIEQUIVALENT(S): at 16:15

## 2020-11-04 RX ADMIN — Medication 125 MILLILITER(S): at 17:56

## 2020-11-04 RX ADMIN — Medication 125 MILLILITER(S): at 20:49

## 2020-11-04 RX ADMIN — Medication 1000 MILLIGRAM(S): at 14:50

## 2020-11-04 RX ADMIN — HYDROMORPHONE HYDROCHLORIDE 0.5 MILLIGRAM(S): 2 INJECTION INTRAMUSCULAR; INTRAVENOUS; SUBCUTANEOUS at 19:38

## 2020-11-04 RX ADMIN — FENTANYL CITRATE 50 MICROGRAM(S): 50 INJECTION INTRAVENOUS at 17:35

## 2020-11-04 RX ADMIN — Medication 100 MILLIEQUIVALENT(S): at 15:10

## 2020-11-04 RX ADMIN — Medication 12.5 MICROGRAM(S)/KG/MIN: at 15:34

## 2020-11-04 RX ADMIN — CHLORHEXIDINE GLUCONATE 5 MILLILITER(S): 213 SOLUTION TOPICAL at 18:01

## 2020-11-04 RX ADMIN — Medication 62.5 MILLIMOLE(S): at 17:58

## 2020-11-04 RX ADMIN — INSULIN HUMAN 3 UNIT(S)/HR: 100 INJECTION, SOLUTION SUBCUTANEOUS at 19:30

## 2020-11-04 RX ADMIN — Medication 400 MILLIGRAM(S): at 14:34

## 2020-11-04 RX ADMIN — SODIUM CHLORIDE 3 MILLILITER(S): 9 INJECTION INTRAMUSCULAR; INTRAVENOUS; SUBCUTANEOUS at 06:17

## 2020-11-04 RX ADMIN — Medication 4.98 MICROGRAM(S)/KG/MIN: at 22:45

## 2020-11-04 RX ADMIN — Medication 100 MILLIEQUIVALENT(S): at 14:00

## 2020-11-04 RX ADMIN — HYDROMORPHONE HYDROCHLORIDE 0.5 MILLIGRAM(S): 2 INJECTION INTRAMUSCULAR; INTRAVENOUS; SUBCUTANEOUS at 19:50

## 2020-11-04 RX ADMIN — Medication 10 MILLIGRAM(S): at 23:03

## 2020-11-04 RX ADMIN — Medication 100 MILLIEQUIVALENT(S): at 19:45

## 2020-11-04 RX ADMIN — ONDANSETRON 4 MILLIGRAM(S): 8 TABLET, FILM COATED ORAL at 22:00

## 2020-11-04 RX ADMIN — Medication 125 MILLILITER(S): at 18:24

## 2020-11-04 RX ADMIN — SODIUM CHLORIDE 2000 MILLILITER(S): 9 INJECTION, SOLUTION INTRAVENOUS at 15:34

## 2020-11-04 RX ADMIN — ONDANSETRON 4 MILLIGRAM(S): 8 TABLET, FILM COATED ORAL at 23:06

## 2020-11-04 RX ADMIN — Medication 100 MILLIEQUIVALENT(S): at 20:05

## 2020-11-04 RX ADMIN — Medication 100 MILLIEQUIVALENT(S): at 19:15

## 2020-11-04 RX ADMIN — INSULIN HUMAN 3 UNIT(S)/HR: 100 INJECTION, SOLUTION SUBCUTANEOUS at 15:34

## 2020-11-04 RX ADMIN — Medication 400 MILLIGRAM(S): at 20:30

## 2020-11-04 RX ADMIN — Medication 100 MILLIEQUIVALENT(S): at 21:08

## 2020-11-04 RX ADMIN — Medication 125 MILLILITER(S): at 19:00

## 2020-11-04 RX ADMIN — SODIUM CHLORIDE 10 MILLILITER(S): 9 INJECTION INTRAMUSCULAR; INTRAVENOUS; SUBCUTANEOUS at 15:35

## 2020-11-04 RX ADMIN — Medication 125 MILLILITER(S): at 20:00

## 2020-11-04 RX ADMIN — FENTANYL CITRATE 50 MICROGRAM(S): 50 INJECTION INTRAVENOUS at 17:20

## 2020-11-04 RX ADMIN — Medication 125 MILLILITER(S): at 19:30

## 2020-11-04 RX ADMIN — Medication 100 MILLIEQUIVALENT(S): at 22:05

## 2020-11-04 RX ADMIN — Medication 300 MILLIGRAM(S): at 21:21

## 2020-11-04 RX ADMIN — Medication 1000 MILLIGRAM(S): at 20:45

## 2020-11-04 RX ADMIN — Medication 100 GRAM(S): at 23:29

## 2020-11-04 NOTE — PROGRESS NOTE ADULT - SUBJECTIVE AND OBJECTIVE BOX
ARMANI HOSKINS  MRN-[id number]  Patient is a 75y old  Female who presents with a chief complaint of CABG (02 Nov 2020 09:58)    HPI:  75 y.o Female with HTN, HLD, abnormal cath ( severe ostial LAD)   presents to [facility] for 3 Vessel CABG on 11/4/2020 with Dr. Trinh. Patient c/o SOB worsen on exertion, occasional palpitations, denies CP, no fever, chills, no acute complaints. Ambulating without assistance.   Covid PCR today in PST.        (02 Nov 2020 09:58)    Surgery/Hospital course:  11/04 CABG    Today:  S/p CABG    REVIEW OF SYSTEMS:  Unable to obtain as patient is intubated    Physical Exam:  Vital Signs Last 24 Hrs  T(C): 35.7 (04 Nov 2020 20:00), Max: 36.6 (04 Nov 2020 06:17)  T(F): 96.3 (04 Nov 2020 20:00), Max: 97.9 (04 Nov 2020 06:17)  HR: 99 (04 Nov 2020 22:00) (54 - 99)  BP: 124/73 (04 Nov 2020 06:17) (124/73 - 124/73)  BP(mean): --  RR: 13 (04 Nov 2020 22:00) (8 - 24)  SpO2: 100% (04 Nov 2020 22:00) (96% - 100%)  Gen:  Intubated  CNS: non focal 	  Neck: no JVD  RES : clear , no wheezing    Chest:   + chest tubes                     CVS: Regular  rhythm. Normal S1/S2  Abd: Soft, non-distended. Bowel sounds present.  Skin: No rash.  Ext:  no edema, A Line  PSY:  ============================I/O===========================   I&O's Detail    04 Nov 2020 07:01  -  04 Nov 2020 22:56  --------------------------------------------------------  IN:    Albumin 5%  - 250 mL: 1250 mL    Insulin: 19 mL    IV PiggyBack: 750 mL    Lactated Ringers Bolus: 500 mL    NiCARdipine: 30 mL    Norepinephrine: 7.4 mL    PRBCs (Packed Red Blood Cells): 300 mL    sodium chloride 0.9%: 80 mL  Total IN: 2936.4 mL    OUT:    Chest Tube (mL): 180 mL    Chest Tube (mL): 35 mL    Chest Tube (mL): 40 mL    Indwelling Catheter - Urethral (mL): 905 mL  Total OUT: 1160 mL    Total NET: 1776.4 mL    ============================ LABS =========================                        8.6    12.74 )-----------( 128      ( 04 Nov 2020 14:41 )             27.6     11-04    141  |  105  |  13  ----------------------------<  102<H>  3.7   |  24  |  0.64    Ca    8.1<L>      04 Nov 2020 14:41  Phos  1.8     11-04  Mg     2.1     11-04    TPro  4.0<L>  /  Alb  3.0<L>  /  TBili  1.3<H>  /  DBili  x   /  AST  27  /  ALT  9<L>  /  AlkPhos  33<L>  11-04    LIVER FUNCTIONS - ( 04 Nov 2020 14:41 )  Alb: 3.0 g/dL / Pro: 4.0 g/dL / ALK PHOS: 33 U/L / ALT: 9 U/L / AST: 27 U/L / GGT: x           PT/INR - ( 04 Nov 2020 14:41 )   PT: 16.6 sec;   INR: 1.41 ratio         PTT - ( 04 Nov 2020 14:41 )  PTT:25.4 sec  ABG - ( 04 Nov 2020 22:47 )  pH, Arterial: 7.40  pH, Blood: x     /  pCO2: 40    /  pO2: 149   / HCO3: 24    / Base Excess: .2    /  SaO2: 99        ======================Micro/Rad/Cardio=================  CXR: Reviewed  Echo:Reviewed  ======================================================  PAST MEDICAL & SURGICAL HISTORY:  Former smoker  during teenage years    Colon Cancer  s/p colon rection 3/25/09 no recurrence to date, did not require chemo nor radiation    Thyroid Nodule  benign since 08    Hernia, Umbilical  surgically repaired 2009 during colon resection 2009 amd again repaired in 2010 with mesh    History of Ovarian Cyst  left    Single Renal Cyst  right 10cm (per report)    Functional Murmur  childhood polio and rheumatic fever    H/O: Hypertension    Right Facial Numbness  s/p repair of right facial muscle 30 yrs ago    S/P Colonoscopy with Polypectomy    S/P Endoscopy    S/P Colon Resection    S/P Tubal Ligation    S/P [facility] Biopsy of Cervix  due to abnormal PAP 1994 and 2008      ====================ASSESMENT ==============  CAD s/p CABG 11/4  Thrombocytopenia    ====================== NEUROLOGY=====================  Continue to monitor neuro status as per ICU protocol  Meperidine for analgesia    ==================== RESPIRATORY======================  Respiratory status required full ventilatory support, close monitoring of respiratory rate and breathing pattern, the following of ABG’s with A-line monitoring, continuous pulse oximetry monitoring    Mechanical Ventilation:  Mode: AC/ CMV (Assist Control/ Continuous Mandatory Ventilation)  RR (machine): 12  TV (machine): 500  FiO2: 40  PEEP: 5  ITime: 1  MAP: 9  PIP: 25    ====================CARDIOVASCULAR==================  CAD s/p CABG 11/4  Blood pressure support with IV Nicardipine and IV Levophed infusions  Continue with IV Dobutamine for inotropic support  ASA for graft occlusion-thromboembolism prophylaxis  Continue invasive hemodynamic monitoring    ===================HEMATOLOGIC/ONC ===================  Continue to monitor H&H, PLTs.  Thrombocytopenia, platelets 128k.    ===================== RENAL =========================  Continue to monitor I/Os, BUN/Cr, and urine output.   Replete lytes PRN, keeping K > 4 and Mg > 2.     ==================== GASTROINTESTINAL===================  NPO after recent procedure, advance diet as tolerated.   Protonix for stress ulcer prophylaxis.  Continue bowel regimen with Miralax.  Reglan for gut motility.    =======================    ENDOCRINE  =====================  Stress hyperglycemia, requiring glucose control with IV Insulin infusion    ========================INFECTIOUS DISEASE================  Continue Cefuroxime for perioperative antibiotic coverage.      Patient requires continuous monitoring with bedside rhythm monitoring, arterial line, pulse oximetry, ventilator monitoring; intermittent blood gas analysis. Care plan discussed with ICU care team. Patient remains critical; required more than usual ICU care.     By signing my name below, I, Heron Manzo, attest that this documentation has been prepared under the direction and in the presence of Abhilash Parrish MD.  Electronically signed: Moe Doe, 11-04-20 @ 22:56    I, Abhilash Parrish, personally performed the services described in this documentation. All medical record entries made by the pattibrobert were at my direction and in my presence. I have reviewed the chart and agree that the record reflects my personal performance and is accurate and complete  Electronically signed: Abhilash Parrish, 11-04-20 @ 22:56       ARMANI HOSKINS  MRN-[id number]  Patient is a 75y old  Female who presents with a chief complaint of CABG (02 Nov 2020 09:58)    HPI:  75 y.o Female with HTN, HLD, abnormal cath ( severe ostial LAD)   presents to [facility] for 3 Vessel CABG on 11/4/2020 with Dr. Trinh. Patient c/o SOB worsen on exertion, occasional palpitations, denies CP, no fever, chills, no acute complaints. Ambulating without assistance.   Covid PCR today in PST.        (02 Nov 2020 09:58)    Surgery/Hospital course:  11/04 CABGx3    brought form OR intubated, full vent support  hemodynamic instability , on Levo, cardene   insulin drip.    REVIEW OF SYSTEMS:  Unable to obtain as patient is intubated    Physical Exam:  Vital Signs Last 24 Hrs  T(C): 35.7 (04 Nov 2020 20:00), Max: 36.6 (04 Nov 2020 06:17)  T(F): 96.3 (04 Nov 2020 20:00), Max: 97.9 (04 Nov 2020 06:17)  HR: 99 (04 Nov 2020 22:00) (54 - 99)  BP: 124/73 (04 Nov 2020 06:17) (124/73 - 124/73)  BP(mean): --  RR: 13 (04 Nov 2020 22:00) (8 - 24)  SpO2: 100% (04 Nov 2020 22:00) (96% - 100%)  Gen:  Intubated  CNS: non focal 	  Neck: no JVD  RES : clear , no wheezing    Chest:   + chest tubes                     CVS: Regular  rhythm. Normal S1/S2  Abd: Soft, non-distended. Bowel sounds present.  Skin: No rash.  Ext:  no edema, A Line  PSY:  ============================I/O===========================   I&O's Detail    04 Nov 2020 07:01  -  04 Nov 2020 22:56  --------------------------------------------------------  IN:    Albumin 5%  - 250 mL: 1250 mL    Insulin: 19 mL    IV PiggyBack: 750 mL    Lactated Ringers Bolus: 500 mL    NiCARdipine: 30 mL    Norepinephrine: 7.4 mL    PRBCs (Packed Red Blood Cells): 300 mL    sodium chloride 0.9%: 80 mL  Total IN: 2936.4 mL    OUT:    Chest Tube (mL): 180 mL    Chest Tube (mL): 35 mL    Chest Tube (mL): 40 mL    Indwelling Catheter - Urethral (mL): 905 mL  Total OUT: 1160 mL    Total NET: 1776.4 mL    ============================ LABS =========================                        8.6    12.74 )-----------( 128      ( 04 Nov 2020 14:41 )             27.6     11-04    141  |  105  |  13  ----------------------------<  102<H>  3.7   |  24  |  0.64    Ca    8.1<L>      04 Nov 2020 14:41  Phos  1.8     11-04  Mg     2.1     11-04    TPro  4.0<L>  /  Alb  3.0<L>  /  TBili  1.3<H>  /  DBili  x   /  AST  27  /  ALT  9<L>  /  AlkPhos  33<L>  11-04    LIVER FUNCTIONS - ( 04 Nov 2020 14:41 )  Alb: 3.0 g/dL / Pro: 4.0 g/dL / ALK PHOS: 33 U/L / ALT: 9 U/L / AST: 27 U/L / GGT: x           PT/INR - ( 04 Nov 2020 14:41 )   PT: 16.6 sec;   INR: 1.41 ratio         PTT - ( 04 Nov 2020 14:41 )  PTT:25.4 sec  ABG - ( 04 Nov 2020 22:47 )  pH, Arterial: 7.40  pH, Blood: x     /  pCO2: 40    /  pO2: 149   / HCO3: 24    / Base Excess: .2    /  SaO2: 99        ======================Micro/Rad/Cardio=================  CXR: Reviewed  Echo:Reviewed  ======================================================  PAST MEDICAL & SURGICAL HISTORY:  Former smoker uring teenage years  Colon Cancer s/p colon rection 3/25/09 no recurrence to date, did not require chemo nor radiation  Thyroid Nodule benign since 08  Hernia, Umbilical surgically repaired 2009 during colon resection 2009 amd again repaired in 2010 with mesh  History of Ovarian Cyst left  Single Renal Cyst ight 10cm (per report)  Functional Murmur childhood polio and rheumatic fever  H/O: Hypertension  Right Facial Numbness s/p repair of right facial muscle 30 yrs ago  S/P Colonoscopy with Polypectomy  S/P Endoscopy  S/P Colon Resection  S/P Tubal Ligation  S/P [facility] Biopsy of Cervix  due to abnormal PAP 1994 and 2008      ====================ASSESMENT ==============  CAD s/p CABG 11/4/2020  Hemodynamic instability  Hyperglycemia  Thrombocytopenia  Hypertension  weaning mechanical ventilation    ====================== NEUROLOGY=====================  Continue to monitor neuro status as per ICU protocol  Meperidine for analgesia    ==================== RESPIRATORY======================  Respiratory status required full ventilatory support, close monitoring of respiratory rate and breathing pattern, the following of ABG’s with A-line monitoring, continuous pulse oximetry monitoring    Mechanical Ventilation:  Mode: AC/ CMV (Assist Control/ Continuous Mandatory Ventilation)  RR (machine): 12  TV (machine): 500  FiO2: 40  PEEP: 5  ITime: 1  MAP: 9  PIP: 25    ====================CARDIOVASCULAR==================  CAD s/p CABG 11/4  Blood pressure support with IV Nicardipine and IV Levophed infusions  Continue with IV Dobutamine for inotropic support  ASA for graft occlusion-thromboembolism prophylaxis  Continue invasive hemodynamic monitoring    ===================HEMATOLOGIC/ONC ===================  Continue to monitor H&H, PLTs.  Thrombocytopenia, platelets 128k.    ===================== RENAL =========================  Continue to monitor I/Os, BUN/Cr, and urine output.   Replete lytes PRN, keeping K > 4 and Mg > 2.     ==================== GASTROINTESTINAL===================  NPO after recent procedure, advance diet as tolerated.   Protonix for stress ulcer prophylaxis.  Continue bowel regimen with Miralax.  Reglan for gut motility.    =======================    ENDOCRINE  =====================  Stress hyperglycemia, requiring glucose control with IV Insulin infusion    ========================INFECTIOUS DISEASE================  Continue Cefuroxime for perioperative antibiotic coverage.      Patient requires continuous monitoring with bedside rhythm monitoring, arterial line, pulse oximetry, ventilator monitoring; intermittent blood gas analysis. Care plan discussed with ICU care team. Patient remains critical; required more than usual post op .   pt is critical , time spent 30 min    By signing my name below, I, Heron Manzo, attest that this documentation has been prepared under the direction and in the presence of Abhilash Parrish MD.  Electronically signed: Moe Doe, 11-04-20 @ 22:56    IAbhilash, personally performed the services described in this documentation. All medical record entries made by the scribe were at my direction and in my presence. I have reviewed the chart and agree that the record reflects my personal performance and is accurate and complete  Electronically signed: Abhilash Parrish, 11-04-20 @ 22:56       ARMANI HOSKINS  MRN-[id number]  Patient is a 75y old  Female who presents with a chief complaint of CABG (02 Nov 2020 09:58)    HPI:  75 y.o Female with HTN, HLD, abnormal cath ( severe ostial LAD)   presents to [facility] for 3 Vessel CABG on 11/4/2020 with Dr. Trinh. Patient c/o SOB worsen on exertion, occasional palpitations, denies CP, no fever, chills, no acute complaints. Ambulating without assistance.   Covid PCR today in PST.  (02 Nov 2020 09:58)    Surgery/Hospital course:  11/04 CABGx3    brought form OR intubated, full vent support  hemodynamic instability , on Levo, cardene   insulin drip.    REVIEW OF SYSTEMS:  Unable to obtain as patient is intubated    Physical Exam:  Vital Signs Last 24 Hrs  T(C): 35.7 (04 Nov 2020 20:00), Max: 36.6 (04 Nov 2020 06:17)  T(F): 96.3 (04 Nov 2020 20:00), Max: 97.9 (04 Nov 2020 06:17)  HR: 99 (04 Nov 2020 22:00) (54 - 99)  BP: 124/73 (04 Nov 2020 06:17) (124/73 - 124/73)  BP(mean): --  RR: 13 (04 Nov 2020 22:00) (8 - 24)  SpO2: 100% (04 Nov 2020 22:00) (96% - 100%)  Gen:  Intubated  CNS: non focal 	  Neck: no JVD  RES : clear , no wheezing    Chest:   + chest tubes                     CVS: Regular  rhythm. Normal S1/S2  Abd: Soft, non-distended. Bowel sounds present.  Skin: No rash.  Ext:  no edema, A Line  PSY:  ============================I/O===========================   I&O's Detail    04 Nov 2020 07:01  -  04 Nov 2020 22:56  --------------------------------------------------------  IN:    Albumin 5%  - 250 mL: 1250 mL    Insulin: 19 mL    IV PiggyBack: 750 mL    Lactated Ringers Bolus: 500 mL    NiCARdipine: 30 mL    Norepinephrine: 7.4 mL    PRBCs (Packed Red Blood Cells): 300 mL    sodium chloride 0.9%: 80 mL  Total IN: 2936.4 mL    OUT:    Chest Tube (mL): 180 mL    Chest Tube (mL): 35 mL    Chest Tube (mL): 40 mL    Indwelling Catheter - Urethral (mL): 905 mL  Total OUT: 1160 mL    Total NET: 1776.4 mL    ============================ LABS =========================                        8.6    12.74 )-----------( 128      ( 04 Nov 2020 14:41 )             27.6     11-04    141  |  105  |  13  ----------------------------<  102<H>  3.7   |  24  |  0.64    Ca    8.1<L>      04 Nov 2020 14:41  Phos  1.8     11-04  Mg     2.1     11-04    TPro  4.0<L>  /  Alb  3.0<L>  /  TBili  1.3<H>  /  DBili  x   /  AST  27  /  ALT  9<L>  /  AlkPhos  33<L>  11-04    LIVER FUNCTIONS - ( 04 Nov 2020 14:41 )  Alb: 3.0 g/dL / Pro: 4.0 g/dL / ALK PHOS: 33 U/L / ALT: 9 U/L / AST: 27 U/L / GGT: x           PT/INR - ( 04 Nov 2020 14:41 )   PT: 16.6 sec;   INR: 1.41 ratio         PTT - ( 04 Nov 2020 14:41 )  PTT:25.4 sec  ABG - ( 04 Nov 2020 22:47 )  pH, Arterial: 7.40  pH, Blood: x     /  pCO2: 40    /  pO2: 149   / HCO3: 24    / Base Excess: .2    /  SaO2: 99        ======================Micro/Rad/Cardio=================  CXR: Reviewed  Echo:Reviewed  ======================================================  PAST MEDICAL & SURGICAL HISTORY:  Former smoker uring teenage years  Colon Cancer s/p colon rection 3/25/09 no recurrence to date, did not require chemo nor radiation  Thyroid Nodule benign since 08  Hernia, Umbilical surgically repaired 2009 during colon resection 2009 amd again repaired in 2010 with mesh  History of Ovarian Cyst left  Single Renal Cyst ight 10cm (per report)  Functional Murmur childhood polio and rheumatic fever  H/O: Hypertension  Right Facial Numbness s/p repair of right facial muscle 30 yrs ago  S/P Colonoscopy with Polypectomy  S/P Endoscopy  S/P Colon Resection  S/P Tubal Ligation  S/P [facility] Biopsy of Cervix  due to abnormal PAP 1994 and 2008      ====================ASSESMENT ==============  CAD s/p CABGx3  11/4/2020  Hemodynamic instability  Hyperglycemia  Thrombocytopenia  Hypertension  weaning mechanical ventilation    ====================== NEUROLOGY=====================  Continue to monitor neuro status as per ICU protocol  Meperidine for analgesia    ==================== RESPIRATORY======================  Respiratory status required full ventilatory support, close monitoring of respiratory rate and breathing pattern, the following of ABG’s with A-line monitoring, continuous pulse oximetry monitoring    Mechanical Ventilation:  Mode: AC/ CMV (Assist Control/ Continuous Mandatory Ventilation)  RR (machine): 12  TV (machine): 500  FiO2: 40  PEEP: 5  ITime: 1  MAP: 9  PIP: 25    ====================CARDIOVASCULAR==================  CAD s/p CABG 11/4  Blood pressure support with IV Nicardipine and IV Levophed infusions  Continue with IV Dobutamine for inotropic support  ASA for graft occlusion-thromboembolism prophylaxis  Continue invasive hemodynamic monitoring    ===================HEMATOLOGIC/ONC ===================  Continue to monitor H&H, PLTs.  Thrombocytopenia, platelets 128k.    ===================== RENAL =========================  Continue to monitor I/Os, BUN/Cr, and urine output.   Replete lytes PRN, keeping K > 4 and Mg > 2.     ==================== GASTROINTESTINAL===================  NPO after recent procedure, advance diet as tolerated.   Protonix for stress ulcer prophylaxis.  Continue bowel regimen with Miralax.  Reglan for gut motility.    =======================    ENDOCRINE  =====================  Stress hyperglycemia, requiring glucose control with IV Insulin infusion    ========================INFECTIOUS DISEASE================  Continue Cefuroxime for perioperative antibiotic coverage.      Patient requires continuous monitoring with bedside rhythm monitoring, arterial line, pulse oximetry, ventilator monitoring; intermittent blood gas analysis. Care plan discussed with ICU care team. Patient remains critical; required more than usual post op .   pt is critical , time spent 30 min    By signing my name below, I, Heron Manzo, attest that this documentation has been prepared under the direction and in the presence of Abhilash Parrish MD.  Electronically signed: Moe Doe, 11-04-20 @ 22:56    I, Abhilash Parrish, personally performed the services described in this documentation. All medical record entries made by the scribe were at my direction and in my presence. I have reviewed the chart and agree that the record reflects my personal performance and is accurate and complete  Electronically signed: Abhilash Parrish, 11-04-20 @ 22:56

## 2020-11-04 NOTE — BRIEF OPERATIVE NOTE - OPERATION/FINDINGS
CABG LIMA-LAD; SVG-D1-LAD    L. greater saphenous vein harvested endoscopically by GA DANIEL and GA DANIEL.

## 2020-11-04 NOTE — AIRWAY REMOVAL NOTE  ADULT & PEDS - ARTIFICAL AIRWAY REMOVAL COMMENTS
Written order for extubation verified. The patient was identified by full name and birth date compared to the identification band. Present during the procedure was ROXANNA Mohr and RT Xavier

## 2020-11-05 DIAGNOSIS — Z96.89 PRESENCE OF OTHER SPECIFIED FUNCTIONAL IMPLANTS: ICD-10-CM

## 2020-11-05 DIAGNOSIS — Z95.1 PRESENCE OF AORTOCORONARY BYPASS GRAFT: ICD-10-CM

## 2020-11-05 DIAGNOSIS — R41.0 DISORIENTATION, UNSPECIFIED: ICD-10-CM

## 2020-11-05 LAB
ALBUMIN SERPL ELPH-MCNC: 4.7 G/DL — SIGNIFICANT CHANGE UP (ref 3.3–5)
ALP SERPL-CCNC: 30 U/L — LOW (ref 40–120)
ALT FLD-CCNC: 10 U/L — SIGNIFICANT CHANGE UP (ref 10–45)
ANION GAP SERPL CALC-SCNC: 16 MMOL/L — SIGNIFICANT CHANGE UP (ref 5–17)
APTT BLD: 27.6 SEC — SIGNIFICANT CHANGE UP (ref 27.5–35.5)
AST SERPL-CCNC: 30 U/L — SIGNIFICANT CHANGE UP (ref 10–40)
BASE EXCESS BLDV CALC-SCNC: 1.9 MMOL/L — SIGNIFICANT CHANGE UP (ref -2–2)
BILIRUB SERPL-MCNC: 2.2 MG/DL — HIGH (ref 0.2–1.2)
BUN SERPL-MCNC: 12 MG/DL — SIGNIFICANT CHANGE UP (ref 7–23)
CA-I SERPL-SCNC: 1.17 MMOL/L — SIGNIFICANT CHANGE UP (ref 1.12–1.3)
CALCIUM SERPL-MCNC: 8.5 MG/DL — SIGNIFICANT CHANGE UP (ref 8.4–10.5)
CHLORIDE BLDV-SCNC: 103 MMOL/L — SIGNIFICANT CHANGE UP (ref 96–108)
CHLORIDE SERPL-SCNC: 104 MMOL/L — SIGNIFICANT CHANGE UP (ref 96–108)
CO2 BLDV-SCNC: 28 MMOL/L — SIGNIFICANT CHANGE UP (ref 22–30)
CO2 SERPL-SCNC: 22 MMOL/L — SIGNIFICANT CHANGE UP (ref 22–31)
CREAT SERPL-MCNC: 0.63 MG/DL — SIGNIFICANT CHANGE UP (ref 0.5–1.3)
GAS PNL BLDA: SIGNIFICANT CHANGE UP
GAS PNL BLDV: 142 MMOL/L — SIGNIFICANT CHANGE UP (ref 135–145)
GAS PNL BLDV: SIGNIFICANT CHANGE UP
GLUCOSE BLDC GLUCOMTR-MCNC: 102 MG/DL — HIGH (ref 70–99)
GLUCOSE BLDC GLUCOMTR-MCNC: 111 MG/DL — HIGH (ref 70–99)
GLUCOSE BLDC GLUCOMTR-MCNC: 117 MG/DL — HIGH (ref 70–99)
GLUCOSE BLDC GLUCOMTR-MCNC: 117 MG/DL — HIGH (ref 70–99)
GLUCOSE BLDC GLUCOMTR-MCNC: 119 MG/DL — HIGH (ref 70–99)
GLUCOSE BLDC GLUCOMTR-MCNC: 125 MG/DL — HIGH (ref 70–99)
GLUCOSE BLDC GLUCOMTR-MCNC: 159 MG/DL — HIGH (ref 70–99)
GLUCOSE BLDV-MCNC: 122 MG/DL — HIGH (ref 70–99)
GLUCOSE SERPL-MCNC: 136 MG/DL — HIGH (ref 70–99)
HCO3 BLDV-SCNC: 27 MMOL/L — SIGNIFICANT CHANGE UP (ref 21–29)
HCT VFR BLD CALC: 29.9 % — LOW (ref 34.5–45)
HCT VFR BLDA CALC: 34 % — LOW (ref 39–50)
HGB BLD CALC-MCNC: 11 G/DL — LOW (ref 11.5–15.5)
HGB BLD-MCNC: 9.6 G/DL — LOW (ref 11.5–15.5)
HOROWITZ INDEX BLDV+IHG-RTO: 32 — SIGNIFICANT CHANGE UP
INR BLD: 1.27 RATIO — HIGH (ref 0.88–1.16)
LACTATE BLDV-MCNC: 1.4 MMOL/L — SIGNIFICANT CHANGE UP (ref 0.7–2)
MAGNESIUM SERPL-MCNC: 1.9 MG/DL — SIGNIFICANT CHANGE UP (ref 1.6–2.6)
MCHC RBC-ENTMCNC: 23.9 PG — LOW (ref 27–34)
MCHC RBC-ENTMCNC: 32.1 GM/DL — SIGNIFICANT CHANGE UP (ref 32–36)
MCV RBC AUTO: 74.4 FL — LOW (ref 80–100)
NRBC # BLD: 0 /100 WBCS — SIGNIFICANT CHANGE UP (ref 0–0)
OTHER CELLS CSF MANUAL: 12 ML/DL — LOW (ref 18–22)
PCO2 BLDV: 46 MMHG — SIGNIFICANT CHANGE UP (ref 35–50)
PH BLDV: 7.38 — SIGNIFICANT CHANGE UP (ref 7.35–7.45)
PHOSPHATE SERPL-MCNC: 3.8 MG/DL — SIGNIFICANT CHANGE UP (ref 2.5–4.5)
PLATELET # BLD AUTO: 94 K/UL — LOW (ref 150–400)
PO2 BLDV: 44 MMHG — SIGNIFICANT CHANGE UP (ref 25–45)
POTASSIUM BLDV-SCNC: 4.3 MMOL/L — SIGNIFICANT CHANGE UP (ref 3.5–5.3)
POTASSIUM SERPL-MCNC: 4.3 MMOL/L — SIGNIFICANT CHANGE UP (ref 3.5–5.3)
POTASSIUM SERPL-SCNC: 4.3 MMOL/L — SIGNIFICANT CHANGE UP (ref 3.5–5.3)
PROT SERPL-MCNC: 5.5 G/DL — LOW (ref 6–8.3)
PROTHROM AB SERPL-ACNC: 15.1 SEC — HIGH (ref 10.6–13.6)
RBC # BLD: 4.02 M/UL — SIGNIFICANT CHANGE UP (ref 3.8–5.2)
RBC # FLD: 18.8 % — HIGH (ref 10.3–14.5)
SAO2 % BLDV: 78 % — SIGNIFICANT CHANGE UP (ref 67–88)
SODIUM SERPL-SCNC: 142 MMOL/L — SIGNIFICANT CHANGE UP (ref 135–145)
WBC # BLD: 7.2 K/UL — SIGNIFICANT CHANGE UP (ref 3.8–10.5)
WBC # FLD AUTO: 7.2 K/UL — SIGNIFICANT CHANGE UP (ref 3.8–10.5)

## 2020-11-05 PROCEDURE — 93010 ELECTROCARDIOGRAM REPORT: CPT

## 2020-11-05 PROCEDURE — 99291 CRITICAL CARE FIRST HOUR: CPT

## 2020-11-05 PROCEDURE — 71045 X-RAY EXAM CHEST 1 VIEW: CPT | Mod: 26

## 2020-11-05 RX ORDER — INSULIN LISPRO 100/ML
VIAL (ML) SUBCUTANEOUS
Refills: 0 | Status: DISCONTINUED | OUTPATIENT
Start: 2020-11-05 | End: 2020-11-05

## 2020-11-05 RX ORDER — METOPROLOL TARTRATE 50 MG
25 TABLET ORAL
Refills: 0 | Status: DISCONTINUED | OUTPATIENT
Start: 2020-11-05 | End: 2020-11-06

## 2020-11-05 RX ORDER — HALOPERIDOL DECANOATE 100 MG/ML
2.5 INJECTION INTRAMUSCULAR ONCE
Refills: 0 | Status: COMPLETED | OUTPATIENT
Start: 2020-11-05 | End: 2020-11-05

## 2020-11-05 RX ORDER — OXYCODONE AND ACETAMINOPHEN 5; 325 MG/1; MG/1
1 TABLET ORAL EVERY 6 HOURS
Refills: 0 | Status: DISCONTINUED | OUTPATIENT
Start: 2020-11-05 | End: 2020-11-05

## 2020-11-05 RX ORDER — OXYCODONE AND ACETAMINOPHEN 5; 325 MG/1; MG/1
2 TABLET ORAL EVERY 6 HOURS
Refills: 0 | Status: DISCONTINUED | OUTPATIENT
Start: 2020-11-05 | End: 2020-11-05

## 2020-11-05 RX ORDER — ATORVASTATIN CALCIUM 80 MG/1
40 TABLET, FILM COATED ORAL AT BEDTIME
Refills: 0 | Status: DISCONTINUED | OUTPATIENT
Start: 2020-11-05 | End: 2020-11-08

## 2020-11-05 RX ORDER — ACETAMINOPHEN 500 MG
1000 TABLET ORAL ONCE
Refills: 0 | Status: COMPLETED | OUTPATIENT
Start: 2020-11-05 | End: 2020-11-05

## 2020-11-05 RX ORDER — FENTANYL CITRATE 50 UG/ML
50 INJECTION INTRAVENOUS ONCE
Refills: 0 | Status: DISCONTINUED | OUTPATIENT
Start: 2020-11-05 | End: 2020-11-05

## 2020-11-05 RX ORDER — METOPROLOL TARTRATE 50 MG
2.5 TABLET ORAL ONCE
Refills: 0 | Status: COMPLETED | OUTPATIENT
Start: 2020-11-05 | End: 2020-11-05

## 2020-11-05 RX ORDER — FUROSEMIDE 40 MG
20 TABLET ORAL ONCE
Refills: 0 | Status: COMPLETED | OUTPATIENT
Start: 2020-11-05 | End: 2020-11-05

## 2020-11-05 RX ORDER — POTASSIUM CHLORIDE 20 MEQ
10 PACKET (EA) ORAL
Refills: 0 | Status: COMPLETED | OUTPATIENT
Start: 2020-11-05 | End: 2020-11-05

## 2020-11-05 RX ORDER — ENOXAPARIN SODIUM 100 MG/ML
40 INJECTION SUBCUTANEOUS DAILY
Refills: 0 | Status: DISCONTINUED | OUTPATIENT
Start: 2020-11-05 | End: 2020-11-08

## 2020-11-05 RX ORDER — PANTOPRAZOLE SODIUM 20 MG/1
40 TABLET, DELAYED RELEASE ORAL
Refills: 0 | Status: DISCONTINUED | OUTPATIENT
Start: 2020-11-05 | End: 2020-11-08

## 2020-11-05 RX ORDER — INSULIN LISPRO 100/ML
VIAL (ML) SUBCUTANEOUS AT BEDTIME
Refills: 0 | Status: DISCONTINUED | OUTPATIENT
Start: 2020-11-05 | End: 2020-11-05

## 2020-11-05 RX ADMIN — CHLORHEXIDINE GLUCONATE 1 APPLICATION(S): 213 SOLUTION TOPICAL at 05:34

## 2020-11-05 RX ADMIN — FENTANYL CITRATE 50 MICROGRAM(S): 50 INJECTION INTRAVENOUS at 04:45

## 2020-11-05 RX ADMIN — ENOXAPARIN SODIUM 40 MILLIGRAM(S): 100 INJECTION SUBCUTANEOUS at 12:30

## 2020-11-05 RX ADMIN — OXYCODONE AND ACETAMINOPHEN 2 TABLET(S): 5; 325 TABLET ORAL at 18:22

## 2020-11-05 RX ADMIN — OXYCODONE AND ACETAMINOPHEN 2 TABLET(S): 5; 325 TABLET ORAL at 18:52

## 2020-11-05 RX ADMIN — Medication 400 MILLIGRAM(S): at 05:30

## 2020-11-05 RX ADMIN — Medication 10 MILLIGRAM(S): at 21:03

## 2020-11-05 RX ADMIN — FENTANYL CITRATE 50 MICROGRAM(S): 50 INJECTION INTRAVENOUS at 10:04

## 2020-11-05 RX ADMIN — Medication 1000 MILLIGRAM(S): at 05:45

## 2020-11-05 RX ADMIN — Medication 100 MILLIGRAM(S): at 00:23

## 2020-11-05 RX ADMIN — Medication 100 MILLIGRAM(S): at 15:21

## 2020-11-05 RX ADMIN — Medication 10 MILLIGRAM(S): at 13:22

## 2020-11-05 RX ADMIN — HALOPERIDOL DECANOATE 2.5 MILLIGRAM(S): 100 INJECTION INTRAMUSCULAR at 23:16

## 2020-11-05 RX ADMIN — Medication 10 MILLIGRAM(S): at 05:33

## 2020-11-05 RX ADMIN — Medication 20 MILLIGRAM(S): at 06:13

## 2020-11-05 RX ADMIN — Medication 50 MILLIEQUIVALENT(S): at 11:37

## 2020-11-05 RX ADMIN — Medication 50 MILLIEQUIVALENT(S): at 10:04

## 2020-11-05 RX ADMIN — Medication 25 MILLIGRAM(S): at 14:44

## 2020-11-05 RX ADMIN — FENTANYL CITRATE 50 MICROGRAM(S): 50 INJECTION INTRAVENOUS at 05:00

## 2020-11-05 RX ADMIN — Medication 100 MILLIGRAM(S): at 07:33

## 2020-11-05 RX ADMIN — Medication 50 MILLIEQUIVALENT(S): at 10:03

## 2020-11-05 RX ADMIN — ATORVASTATIN CALCIUM 40 MILLIGRAM(S): 80 TABLET, FILM COATED ORAL at 21:03

## 2020-11-05 RX ADMIN — Medication 2.5 MILLIGRAM(S): at 14:38

## 2020-11-05 RX ADMIN — Medication 325 MILLIGRAM(S): at 12:30

## 2020-11-05 RX ADMIN — POLYETHYLENE GLYCOL 3350 17 GRAM(S): 17 POWDER, FOR SOLUTION ORAL at 12:30

## 2020-11-05 RX ADMIN — FENTANYL CITRATE 50 MICROGRAM(S): 50 INJECTION INTRAVENOUS at 10:19

## 2020-11-05 RX ADMIN — PANTOPRAZOLE SODIUM 40 MILLIGRAM(S): 20 TABLET, DELAYED RELEASE ORAL at 12:30

## 2020-11-05 NOTE — PROGRESS NOTE ADULT - ASSESSMENT
75 y.o Female with HTN, HLD, abnormal cath ( severe ostial LAD)   presents to [facility] for 3 Vessel CABG     Hospital course:  : C3L 1 intra-op PRBC Dobutamine  :  weaned to off, transferred to step down 75 y.o Female with HTN, HLD, abnormal cath ( severe ostial LAD)   presents to PST for 3 Vessel CABG     Hospital course:  : C3L 1 intra-op PRBC Dobutamine  :  weaned to off, transferred to step down--displaying behavior of post-op delirium, combatitve, refusing assessment

## 2020-11-05 NOTE — PHYSICAL THERAPY INITIAL EVALUATION ADULT - PERTINENT HX OF CURRENT PROBLEM, REHAB EVAL
Pt is a 76 y/o F with HTN, abnormal cath (severe ostial LAD)   presented to [facility] for scheduled CABG x3 on 11/4/2020. Patient c/o SOB worsen on exertion & occasional palpitations.

## 2020-11-05 NOTE — PHYSICAL THERAPY INITIAL EVALUATION ADULT - PLANNED THERAPY INTERVENTIONS, PT EVAL
gait training/transfer training/1. GOAL: Pt will be able to negotiate 10 steps +HR independently with reciprocal pattern in 3 weeks./bed mobility training

## 2020-11-05 NOTE — PHYSICAL THERAPY INITIAL EVALUATION ADULT - GENERAL OBSERVATIONS, REHAB EVAL
Pt rec'd seated in OOB chair in NAD, +3 chest tubes, +ext pacer, +5L of O2, +scales, & ICU monitoring

## 2020-11-05 NOTE — PROGRESS NOTE ADULT - SUBJECTIVE AND OBJECTIVE BOX
CTU TRANSFER ACCEPT NOTE    VITAL SIGNS  T(F): 99  HR: 71  BP: 140/64  RR: 14  SpO2: 98%                          9.6    7.20  )-----------( 94       ( 05 Nov 2020 01:08 )             29.9     11-05    142  |  104  |  12  ----------------------------<  136<H>  4.3   |  22  |  0.63    Ca    8.5      05 Nov 2020 02:00  Phos  3.8     11-05  Mg     1.9     11-05    TPro  5.5<L>  /  Alb  4.7  /  TBili  2.2<H>  /  DBili  x   /  AST  30  /  ALT  10  /  AlkPhos  30<L>  11-05    CAPILLARY BLOOD GLUCOSE  POCT Blood Glucose.: 125 mg/dL (05 Nov 2020 11:11)  POCT Blood Glucose.: 119 mg/dL (05 Nov 2020 06:55)  POCT Blood Glucose.: 117 mg/dL (05 Nov 2020 05:54)  POCT Blood Glucose.: 111 mg/dL (05 Nov 2020 05:06)  POCT Blood Glucose.: 102 mg/dL (05 Nov 2020 03:03)      DIAGNOSTICS   Xray Chest 1 View- PORTABLE-Routine (11.05.20 @ 02:04) >  S/P sternotomy.  Right IJ venous catheter tip overlies the cavoatrial junction.  Mediastinal drains and left chest tube in situ.  Persistent bibasilar linear atelectases.  No bilateral focal consolidations.  No pleural effusion. No pneumothorax.  No acute bony finding.      MEDICATIONS  aspirin enteric coated 325 milliGRAM(s) Oral daily  atorvastatin 40 milliGRAM(s) Oral at bedtime  cefuroxime  IVPB 1500 milliGRAM(s) IV Intermittent every 8 hours  chlorhexidine 2% Cloths 1 Application(s) Topical <User Schedule>  enoxaparin Injectable 40 milliGRAM(s) SubCutaneous daily  metoclopramide Injectable 10 milliGRAM(s) IV Push every 8 hours  metoprolol tartrate 25 milliGRAM(s) Oral two times a day  pantoprazole    Tablet 40 milliGRAM(s) Oral before breakfast      PHYSICAL EXAM  GEN: No apparent distress, examined in   PSYCH: Appropriate, communicative, A+Ox3  NEURO: Non-focal   CARDIAC: S1 S2 RRR without rub or murmur  Edema +1/4  Pacing wires:  Ventricular  setting:  Isolated  PULMONARY:   clear vesicular  Chest tubes: Pleural   Mediastinal  Abdomen: Soft flat non-tender, non-distended bowel sounds active x 4 LBM:   : DTV  Skin:  warm dry intact   sternal incision:   covered, clean, dry intact dressing  ACE wraps:         CTU TRANSFER ACCEPT NOTE    VITAL SIGNS  T(F): 99  HR: 101  BP: 140/64  RR: 14  SpO2: 98%                          9.6    7.20  )-----------( 94       ( 2020 01:08 )             29.9     11-    142  |  104  |  12  ----------------------------<  136<H>  4.3   |  22  |  0.63    Ca    8.5      2020 02:00  Phos  3.8     11  Mg     1.9     11    TPro  5.5<L>  /  Alb  4.7  /  TBili  2.2<H>  /  DBili  x   /  AST  30  /  ALT  10  /  AlkPhos  30<L>  11-05    CAPILLARY BLOOD GLUCOSE  POCT Blood Glucose.: 125 mg/dL (2020 11:11)  POCT Blood Glucose.: 119 mg/dL (2020 06:55)  POCT Blood Glucose.: 117 mg/dL (2020 05:54)  POCT Blood Glucose.: 111 mg/dL (2020 05:06)  POCT Blood Glucose.: 102 mg/dL (2020 03:03)      DIAGNOSTICS   Xray Chest 1 View- PORTABLE-Routine (20 @ 02:04) >  S/P sternotomy.  Right IJ venous catheter tip overlies the cavoatrial junction.  Mediastinal drains and left chest tube in situ.  Persistent bibasilar linear atelectases.  No bilateral focal consolidations.  No pleural effusion. No pneumothorax.  No acute bony finding.      MEDICATIONS  aspirin enteric coated 325 milliGRAM(s) Oral daily  atorvastatin 40 milliGRAM(s) Oral at bedtime  chlorhexidine 2% Cloths 1 Application(s) Topical <User Schedule>  enoxaparin Injectable 40 milliGRAM(s) SubCutaneous daily  metoclopramide Injectable 10 milliGRAM(s) IV Push every 8 hours  metoprolol tartrate 25 milliGRAM(s) Oral two times a day  pantoprazole    Tablet 40 milliGRAM(s) Oral before breakfast      PHYSICAL EXAM  GEN: Confused, agitated, examined out of bed to chair  PSYCH: last received pain medication at 1800, disoriented to situation, place, combatitve  NEURO: Non-focal   CARDIAC: refused  Pacing wires:  Ventricular  settin/10  PULMONARY:   refused  Chest tubes: Pleural   Mediastinal  Abdomen: refused  : DTV  Skin:  warm dry intact   sternal incision:   covered, clean, dry intact dressing  ACE wraps: R leg

## 2020-11-05 NOTE — PHYSICAL THERAPY INITIAL EVALUATION ADULT - ADDITIONAL COMMENTS
Pt reports living in a private house with her spouse. There are +6 steps to enter, & another 6 once inside. PTA, pt was independent with all functional mobility and & ADL's without the use of an AD.

## 2020-11-05 NOTE — PROGRESS NOTE ADULT - SUBJECTIVE AND OBJECTIVE BOX
CRITICAL CARE ATTENDING - CTICU    MEDICATIONS  (STANDING):  aspirin enteric coated 325 milliGRAM(s) Oral daily  cefuroxime  IVPB 1500 milliGRAM(s) IV Intermittent every 8 hours  chlorhexidine 0.12% Liquid 5 milliLiter(s) Oral Mucosa two times a day  chlorhexidine 2% Cloths 1 Application(s) Topical <User Schedule>  dextrose 50% Injectable 50 milliLiter(s) IV Push every 15 minutes  dextrose 50% Injectable 25 milliLiter(s) IV Push every 15 minutes  DOBUTamine Infusion 2.5 MICROgram(s)/kG/Min (4.98 mL/Hr) IV Continuous <Continuous>  insulin regular Infusion 3 Unit(s)/Hr (3 mL/Hr) IV Continuous <Continuous>  meperidine     Injectable 25 milliGRAM(s) IV Push once  metoclopramide Injectable 10 milliGRAM(s) IV Push every 8 hours  niCARdipine Infusion 3 mG/Hr (15 mL/Hr) IV Continuous <Continuous>  norepinephrine Infusion 0.1 MICROgram(s)/kG/Min (12.5 mL/Hr) IV Continuous <Continuous>  pantoprazole  Injectable 40 milliGRAM(s) IV Push daily  polyethylene glycol 3350 17 Gram(s) Oral daily  potassium chloride  10 mEq/50 mL IVPB 10 milliEquivalent(s) IV Intermittent every 1 hour  potassium chloride  10 mEq/50 mL IVPB 10 milliEquivalent(s) IV Intermittent every 1 hour  potassium chloride  10 mEq/50 mL IVPB 10 milliEquivalent(s) IV Intermittent every 1 hour  sodium chloride 0.9%. 1000 milliLiter(s) (10 mL/Hr) IV Continuous <Continuous>                                    9.6    7.20  )-----------( 94       ( 05 Nov 2020 01:08 )             29.9       11-05    142  |  104  |  12  ----------------------------<  136<H>  4.3   |  22  |  0.63    Ca    8.5      05 Nov 2020 02:00  Phos  3.8     11-05  Mg     1.9     11-05    TPro  5.5<L>  /  Alb  4.7  /  TBili  2.2<H>  /  DBili  x   /  AST  30  /  ALT  10  /  AlkPhos  30<L>  11-05      PT/INR - ( 05 Nov 2020 01:08 )   PT: 15.1 sec;   INR: 1.27 ratio         PTT - ( 05 Nov 2020 01:08 )  PTT:27.6 sec      Daily     Daily       11-04 @ 07:01  -  11-05 @ 07:00  --------------------------------------------------------  IN: 3740.9 mL / OUT: 2265 mL / NET: 1475.9 mL        Critically Ill patient  : [ ] preoperative ,   [ x] post operative    Requires :  [x ] Arterial Line   [x ] Central Line  [ ] PA catheter  [ ] IABP  [ ] ECMO  [ ] LVAD  [ ] Ventilator  [x ] pacemaker [ ] Impella.                      [ x] ABG's     [x ] Pulse Oxymetry Monitoring  Bedside evaluation , monitoring , treatment of hemodynamics , fluids , IVP/ IVCD meds.        Diagnosis:     POD 1 - CABG     Chest tube drainage     Extubated overnight     Requires chest PT, pulmonary toilet, suctioning to maintain SaO2,  patent airway and treat atelectasis.     Hemodynamic lability,  instability. Requires IVCD [ x] vasopressors [ x] inotropes  [ x] vasodilator  [ ]IVSS fluid  to maintain MAP, perfusion, C.I.     Temporary pacemaker (TPM) interrogation and setting.     IVCD insulin     Hypotension     Requires bedside physical therapy, mobilization and total snf care.             By signing my name below, I, Diana Bey, attest that this documentation has been prepared under the direction and in the presence of Houston Dominguez MD.   Electronically Signed: Moe Ernst. 11-05-20 @ 07:34    Discussed with CT surgeon, Physician's Assistant - Nurse Practitioner- Critical care medicine team.   Dicussed at  AM / PM rounds.   Chart, labs , films reviewed.    Total Time: CRITICAL CARE ATTENDING - CTICU    MEDICATIONS  (STANDING):  aspirin enteric coated 325 milliGRAM(s) Oral daily  cefuroxime  IVPB 1500 milliGRAM(s) IV Intermittent every 8 hours  chlorhexidine 0.12% Liquid 5 milliLiter(s) Oral Mucosa two times a day  chlorhexidine 2% Cloths 1 Application(s) Topical <User Schedule>  dextrose 50% Injectable 50 milliLiter(s) IV Push every 15 minutes  dextrose 50% Injectable 25 milliLiter(s) IV Push every 15 minutes  DOBUTamine Infusion 2.5 MICROgram(s)/kG/Min (4.98 mL/Hr) IV Continuous <Continuous>  insulin regular Infusion 3 Unit(s)/Hr (3 mL/Hr) IV Continuous <Continuous>  meperidine     Injectable 25 milliGRAM(s) IV Push once  metoclopramide Injectable 10 milliGRAM(s) IV Push every 8 hours  niCARdipine Infusion 3 mG/Hr (15 mL/Hr) IV Continuous <Continuous>  norepinephrine Infusion 0.1 MICROgram(s)/kG/Min (12.5 mL/Hr) IV Continuous <Continuous>  pantoprazole  Injectable 40 milliGRAM(s) IV Push daily  polyethylene glycol 3350 17 Gram(s) Oral daily  potassium chloride  10 mEq/50 mL IVPB 10 milliEquivalent(s) IV Intermittent every 1 hour  potassium chloride  10 mEq/50 mL IVPB 10 milliEquivalent(s) IV Intermittent every 1 hour  potassium chloride  10 mEq/50 mL IVPB 10 milliEquivalent(s) IV Intermittent every 1 hour  sodium chloride 0.9%. 1000 milliLiter(s) (10 mL/Hr) IV Continuous <Continuous>                                    9.6    7.20  )-----------( 94       ( 05 Nov 2020 01:08 )             29.9       11-05    142  |  104  |  12  ----------------------------<  136<H>  4.3   |  22  |  0.63    Ca    8.5      05 Nov 2020 02:00  Phos  3.8     11-05  Mg     1.9     11-05    TPro  5.5<L>  /  Alb  4.7  /  TBili  2.2<H>  /  DBili  x   /  AST  30  /  ALT  10  /  AlkPhos  30<L>  11-05      PT/INR - ( 05 Nov 2020 01:08 )   PT: 15.1 sec;   INR: 1.27 ratio         PTT - ( 05 Nov 2020 01:08 )  PTT:27.6 sec      Daily     Daily       11-04 @ 07:01  -  11-05 @ 07:00  --------------------------------------------------------  IN: 3740.9 mL / OUT: 2265 mL / NET: 1475.9 mL        Critically Ill patient  : [ ] preoperative ,   [ x] post operative    Requires :  [x ] Arterial Line   [x ] Central Line  [ ] PA catheter  [ ] IABP  [ ] ECMO  [ ] LVAD  [ ] Ventilator  [x ] pacemaker [ ] Impella.                      [ x] ABG's     [x ] Pulse Oxymetry Monitoring  Bedside evaluation , monitoring , treatment of hemodynamics , fluids , IVP/ IVCD meds.        Diagnosis:     POD 1 - CABG X 3L    Hypotension a/w Hypertension, requiring intravenous medication.     Chest tube drainage     Extubated overnight     Requires chest PT, pulmonary toilet, suctioning to maintain SaO2,  patent airway and treat atelectasis.     Hemodynamic lability,  instability. Requires IVCD [ x] vasopressors [ x] inotropes  [ x] vasodilator  [ ]IVSS fluid  to maintain MAP, perfusion, C.I.     Temporary pacemaker (TPM) interrogation and setting.     IVCD insulin     Hypotension     Hypovolemia    Requires bedside physical therapy, mobilization and total [facility] care.     Thrombocytopenia             By signing my name below, I, Diana Bey, attest that this documentation has been prepared under the direction and in the presence of Houston Dominguez MD.   Electronically Signed: Moe Ernst. 11-05-20 @ 07:34    IHouston, personally performed the services described in this documentation. All medical record entries made by the scribe were at my direction and in my presence. I have reviewed the chart and agree that the record reflects my personal performance and is accurate and complete.   Houston Dominguez MD.     Discussed with CT surgeon, Physician's Assistant - Nurse Practitioner- Critical care medicine team.   Dicussed at  AM / PM rounds.   Chart, labs , films reviewed.    Total Time: 30 min

## 2020-11-06 ENCOUNTER — NON-APPOINTMENT (OUTPATIENT)
Age: 75
End: 2020-11-06

## 2020-11-06 LAB
ALBUMIN SERPL ELPH-MCNC: 4.1 G/DL — SIGNIFICANT CHANGE UP (ref 3.3–5)
ALP SERPL-CCNC: 54 U/L — SIGNIFICANT CHANGE UP (ref 40–120)
ALT FLD-CCNC: 13 U/L — SIGNIFICANT CHANGE UP (ref 10–45)
ANION GAP SERPL CALC-SCNC: 10 MMOL/L — SIGNIFICANT CHANGE UP (ref 5–17)
AST SERPL-CCNC: 25 U/L — SIGNIFICANT CHANGE UP (ref 10–40)
BASOPHILS # BLD AUTO: 0.03 K/UL — SIGNIFICANT CHANGE UP (ref 0–0.2)
BASOPHILS NFR BLD AUTO: 0.2 % — SIGNIFICANT CHANGE UP (ref 0–2)
BILIRUB SERPL-MCNC: 3.9 MG/DL — HIGH (ref 0.2–1.2)
BUN SERPL-MCNC: 13 MG/DL — SIGNIFICANT CHANGE UP (ref 7–23)
CALCIUM SERPL-MCNC: 8.8 MG/DL — SIGNIFICANT CHANGE UP (ref 8.4–10.5)
CHLORIDE SERPL-SCNC: 102 MMOL/L — SIGNIFICANT CHANGE UP (ref 96–108)
CO2 SERPL-SCNC: 26 MMOL/L — SIGNIFICANT CHANGE UP (ref 22–31)
CREAT SERPL-MCNC: 0.56 MG/DL — SIGNIFICANT CHANGE UP (ref 0.5–1.3)
EOSINOPHIL # BLD AUTO: 0.01 K/UL — SIGNIFICANT CHANGE UP (ref 0–0.5)
EOSINOPHIL NFR BLD AUTO: 0.1 % — SIGNIFICANT CHANGE UP (ref 0–6)
GLUCOSE SERPL-MCNC: 159 MG/DL — HIGH (ref 70–99)
HCT VFR BLD CALC: 34.9 % — SIGNIFICANT CHANGE UP (ref 34.5–45)
HGB BLD-MCNC: 11.2 G/DL — LOW (ref 11.5–15.5)
IMM GRANULOCYTES NFR BLD AUTO: 0.6 % — SIGNIFICANT CHANGE UP (ref 0–1.5)
LYMPHOCYTES # BLD AUTO: 0.53 K/UL — LOW (ref 1–3.3)
LYMPHOCYTES # BLD AUTO: 4.3 % — LOW (ref 13–44)
MAGNESIUM SERPL-MCNC: 2.6 MG/DL — SIGNIFICANT CHANGE UP (ref 1.6–2.6)
MCHC RBC-ENTMCNC: 23.8 PG — LOW (ref 27–34)
MCHC RBC-ENTMCNC: 32.1 GM/DL — SIGNIFICANT CHANGE UP (ref 32–36)
MCV RBC AUTO: 74.3 FL — LOW (ref 80–100)
MONOCYTES # BLD AUTO: 0.95 K/UL — HIGH (ref 0–0.9)
MONOCYTES NFR BLD AUTO: 7.7 % — SIGNIFICANT CHANGE UP (ref 2–14)
NEUTROPHILS # BLD AUTO: 10.78 K/UL — HIGH (ref 1.8–7.4)
NEUTROPHILS NFR BLD AUTO: 87.1 % — HIGH (ref 43–77)
NRBC # BLD: 0 /100 WBCS — SIGNIFICANT CHANGE UP (ref 0–0)
PLATELET # BLD AUTO: 129 K/UL — LOW (ref 150–400)
POTASSIUM SERPL-MCNC: 4.1 MMOL/L — SIGNIFICANT CHANGE UP (ref 3.5–5.3)
POTASSIUM SERPL-SCNC: 4.1 MMOL/L — SIGNIFICANT CHANGE UP (ref 3.5–5.3)
PROT SERPL-MCNC: 5.9 G/DL — LOW (ref 6–8.3)
RBC # BLD: 4.7 M/UL — SIGNIFICANT CHANGE UP (ref 3.8–5.2)
RBC # FLD: 18.7 % — HIGH (ref 10.3–14.5)
SODIUM SERPL-SCNC: 138 MMOL/L — SIGNIFICANT CHANGE UP (ref 135–145)
WBC # BLD: 12.38 K/UL — HIGH (ref 3.8–10.5)
WBC # FLD AUTO: 12.38 K/UL — HIGH (ref 3.8–10.5)

## 2020-11-06 PROCEDURE — 71045 X-RAY EXAM CHEST 1 VIEW: CPT | Mod: 26,77

## 2020-11-06 PROCEDURE — 71045 X-RAY EXAM CHEST 1 VIEW: CPT | Mod: 26

## 2020-11-06 PROCEDURE — 93010 ELECTROCARDIOGRAM REPORT: CPT

## 2020-11-06 RX ORDER — MAGNESIUM SULFATE 500 MG/ML
2 VIAL (ML) INJECTION ONCE
Refills: 0 | Status: COMPLETED | OUTPATIENT
Start: 2020-11-06 | End: 2020-11-06

## 2020-11-06 RX ORDER — HALOPERIDOL DECANOATE 100 MG/ML
1 INJECTION INTRAMUSCULAR ONCE
Refills: 0 | Status: COMPLETED | OUTPATIENT
Start: 2020-11-06 | End: 2020-11-06

## 2020-11-06 RX ORDER — METOPROLOL TARTRATE 50 MG
50 TABLET ORAL EVERY 12 HOURS
Refills: 0 | Status: DISCONTINUED | OUTPATIENT
Start: 2020-11-06 | End: 2020-11-07

## 2020-11-06 RX ADMIN — Medication 10 MILLIGRAM(S): at 13:07

## 2020-11-06 RX ADMIN — POLYETHYLENE GLYCOL 3350 17 GRAM(S): 17 POWDER, FOR SOLUTION ORAL at 12:03

## 2020-11-06 RX ADMIN — HALOPERIDOL DECANOATE 1 MILLIGRAM(S): 100 INJECTION INTRAMUSCULAR at 22:10

## 2020-11-06 RX ADMIN — Medication 325 MILLIGRAM(S): at 12:03

## 2020-11-06 RX ADMIN — Medication 50 MILLIGRAM(S): at 05:37

## 2020-11-06 RX ADMIN — Medication 100 MILLIGRAM(S): at 00:18

## 2020-11-06 RX ADMIN — Medication 50 GRAM(S): at 03:23

## 2020-11-06 RX ADMIN — ATORVASTATIN CALCIUM 40 MILLIGRAM(S): 80 TABLET, FILM COATED ORAL at 21:32

## 2020-11-06 RX ADMIN — PANTOPRAZOLE SODIUM 40 MILLIGRAM(S): 20 TABLET, DELAYED RELEASE ORAL at 05:29

## 2020-11-06 RX ADMIN — Medication 50 MILLIGRAM(S): at 17:16

## 2020-11-06 NOTE — PROGRESS NOTE ADULT - SUBJECTIVE AND OBJECTIVE BOX
im ok  VITAL SIGNS    Telemetry:  nsr 100    Vital Signs Last 24 Hrs  T(C): 36.7 (11-06-20 @ 07:15), Max: 37.3 (11-05-20 @ 12:00)  T(F): 98.1 (11-06-20 @ 07:15), Max: 99.1 (11-05-20 @ 12:00)  HR: 78 (11-06-20 @ 07:15) (66 - 138)  BP: 134/62 (11-06-20 @ 07:15) (104/55 - 140/71)  RR: 17 (11-06-20 @ 07:15) (4 - 20)  SpO2: 91% (11-06-20 @ 07:15) (91% - 100%)                   11-05 @ 07:01  -  11-06 @ 07:00  --------------------------------------------------------  IN: 545 mL / OUT: 1535 mL / NET: -990 mL          Daily     Daily             CAPILLARY BLOOD GLUCOSE      POCT Blood Glucose.: 125 mg/dL (05 Nov 2020 11:11)            Drains:     MS         [  x] Drainage:   35/190              L Pleural  [ x ]  Drainage: 65/140              R Pleural  [  ]  Drainage:    Pacing Wires        [x  ]   Settings:                                  Isolated  [  ]    Coumadin    [ ] YES          [ x ]      NO                                   PHYSICAL EXAM        Neurology: alert and oriented x 3, nonfocal, no gross deficits  CV : s1 s2 RRR  Sternal Wound :  CDI , Stable  Lungs: cta  Abdomen: soft, nontender, nondistended, positive bowel sounds, last bowel movement neg                      chest tubes x 2  :    voiding        Extremities:     trace  edema   /  -   calve tenderness ,    L leg   incisions cdi        aspirin enteric coated 325 milliGRAM(s) Oral daily  atorvastatin 40 milliGRAM(s) Oral at bedtime  chlorhexidine 2% Cloths 1 Application(s) Topical <User Schedule>  enoxaparin Injectable 40 milliGRAM(s) SubCutaneous daily  metoclopramide Injectable 10 milliGRAM(s) IV Push every 8 hours  metoprolol tartrate 50 milliGRAM(s) Oral every 12 hours  pantoprazole    Tablet 40 milliGRAM(s) Oral before breakfast  polyethylene glycol 3350 17 Gram(s) Oral daily  sodium chloride 0.9%. 1000 milliLiter(s) IV Continuous <Continuous>                    Physical Therapy Rec:   Home  [  ]   Home w/ PT  [  ]  Rehab  [  ]  Discussed with Cardiothoracic Team at AM rounds.

## 2020-11-06 NOTE — PROGRESS NOTE ADULT - ASSESSMENT
75 y.o Female with HTN, HLD, abnormal cath ( severe ostial LAD)   presents to PST for 3 Vessel CABG     Hospital course:  : C3L 1 intra-op PRBC Dobutamine  :  weaned to off, transferred to step down--displaying behavior of post-op delirium, combatitve, refusing assessment   Pt remains with mild confusion, inappropriate statements, however is aware of surroundings and hospitalization.  No focal deficits .  D/c intro, chest tubes

## 2020-11-06 NOTE — PROGRESS NOTE ADULT - PROBLEM SELECTOR PLAN 4
ASA continued for graft occlusion-thromboembolism prophylaxis  Lipitor was also started for long term graft patency  Lopressor initiated for tachycardia and atrial fibrillation prophylaxis  VTE prophylaxis with SCD+ lovenox  PPI maintained for GI bleeding prophylaxis
ASA continued for graft occlusion-thromboembolism prophylaxis  Lipitor was also started for long term graft patency  Lopressor initiated for tachycardia and atrial fibrillation prophylaxis  VTE prophylaxis with SCD+ lovenox  PPI maintained for GI bleeding prophylaxis

## 2020-11-07 LAB
ALBUMIN SERPL ELPH-MCNC: 4.3 G/DL — SIGNIFICANT CHANGE UP (ref 3.3–5)
ALP SERPL-CCNC: 94 U/L — SIGNIFICANT CHANGE UP (ref 40–120)
ALT FLD-CCNC: 25 U/L — SIGNIFICANT CHANGE UP (ref 10–45)
ANION GAP SERPL CALC-SCNC: 13 MMOL/L — SIGNIFICANT CHANGE UP (ref 5–17)
AST SERPL-CCNC: 32 U/L — SIGNIFICANT CHANGE UP (ref 10–40)
BILIRUB SERPL-MCNC: 2.8 MG/DL — HIGH (ref 0.2–1.2)
BUN SERPL-MCNC: 17 MG/DL — SIGNIFICANT CHANGE UP (ref 7–23)
CALCIUM SERPL-MCNC: 9.3 MG/DL — SIGNIFICANT CHANGE UP (ref 8.4–10.5)
CHLORIDE SERPL-SCNC: 104 MMOL/L — SIGNIFICANT CHANGE UP (ref 96–108)
CO2 SERPL-SCNC: 25 MMOL/L — SIGNIFICANT CHANGE UP (ref 22–31)
CREAT SERPL-MCNC: 0.6 MG/DL — SIGNIFICANT CHANGE UP (ref 0.5–1.3)
GLUCOSE SERPL-MCNC: 106 MG/DL — HIGH (ref 70–99)
HCT VFR BLD CALC: 34.6 % — SIGNIFICANT CHANGE UP (ref 34.5–45)
HGB BLD-MCNC: 11 G/DL — LOW (ref 11.5–15.5)
MCHC RBC-ENTMCNC: 23.7 PG — LOW (ref 27–34)
MCHC RBC-ENTMCNC: 31.8 GM/DL — LOW (ref 32–36)
MCV RBC AUTO: 74.4 FL — LOW (ref 80–100)
NRBC # BLD: 0 /100 WBCS — SIGNIFICANT CHANGE UP (ref 0–0)
PLATELET # BLD AUTO: 135 K/UL — LOW (ref 150–400)
POTASSIUM SERPL-MCNC: 3.6 MMOL/L — SIGNIFICANT CHANGE UP (ref 3.5–5.3)
POTASSIUM SERPL-SCNC: 3.6 MMOL/L — SIGNIFICANT CHANGE UP (ref 3.5–5.3)
PROT SERPL-MCNC: 6.5 G/DL — SIGNIFICANT CHANGE UP (ref 6–8.3)
RBC # BLD: 4.65 M/UL — SIGNIFICANT CHANGE UP (ref 3.8–5.2)
RBC # FLD: 19.3 % — HIGH (ref 10.3–14.5)
SODIUM SERPL-SCNC: 142 MMOL/L — SIGNIFICANT CHANGE UP (ref 135–145)
WBC # BLD: 10.07 K/UL — SIGNIFICANT CHANGE UP (ref 3.8–10.5)
WBC # FLD AUTO: 10.07 K/UL — SIGNIFICANT CHANGE UP (ref 3.8–10.5)

## 2020-11-07 PROCEDURE — 71045 X-RAY EXAM CHEST 1 VIEW: CPT | Mod: 26

## 2020-11-07 RX ORDER — POTASSIUM CHLORIDE 20 MEQ
20 PACKET (EA) ORAL
Refills: 0 | Status: COMPLETED | OUTPATIENT
Start: 2020-11-07 | End: 2020-11-07

## 2020-11-07 RX ORDER — METOPROLOL TARTRATE 50 MG
100 TABLET ORAL DAILY
Refills: 0 | Status: DISCONTINUED | OUTPATIENT
Start: 2020-11-07 | End: 2020-11-08

## 2020-11-07 RX ADMIN — Medication 50 MILLIGRAM(S): at 05:20

## 2020-11-07 RX ADMIN — Medication 100 MILLIGRAM(S): at 15:29

## 2020-11-07 RX ADMIN — Medication 20 MILLIEQUIVALENT(S): at 12:40

## 2020-11-07 RX ADMIN — Medication 20 MILLIEQUIVALENT(S): at 10:29

## 2020-11-07 RX ADMIN — PANTOPRAZOLE SODIUM 40 MILLIGRAM(S): 20 TABLET, DELAYED RELEASE ORAL at 05:20

## 2020-11-07 RX ADMIN — Medication 325 MILLIGRAM(S): at 10:29

## 2020-11-07 RX ADMIN — CHLORHEXIDINE GLUCONATE 1 APPLICATION(S): 213 SOLUTION TOPICAL at 05:20

## 2020-11-07 RX ADMIN — ENOXAPARIN SODIUM 40 MILLIGRAM(S): 100 INJECTION SUBCUTANEOUS at 10:29

## 2020-11-07 RX ADMIN — ATORVASTATIN CALCIUM 40 MILLIGRAM(S): 80 TABLET, FILM COATED ORAL at 21:20

## 2020-11-07 NOTE — PROGRESS NOTE ADULT - SUBJECTIVE AND OBJECTIVE BOX
VITAL SIGNS    Telemetry:  ectopic atrial 90's  Vital Signs Last 24 Hrs  T(C): 36.6 (20 @ 12:27), Max: 37.6 (20 @ 10:00)  T(F): 97.9 (20 @ 12:27), Max: 99.6 (20 @ 10:00)  HR: 95 (20 12:27) (82 - 100)  BP: 122/75 (20 12:27) (116/71 - 145/65)  RR: 18 (20 @ 12:27) (16 - 19)  SpO2: 96% (20 @ 12:27) (92% - 96%)             07:01  -   @ 07:00  --------------------------------------------------------  IN: 1160 mL / OUT: 1260 mL / NET: -100 mL     @ 07:01  -   @ 13:13  --------------------------------------------------------  IN: 240 mL / OUT: 0 mL / NET: 240 mL       Daily     Daily Weight in k (2020 09:50)  Admit Wt: Drug Dosing Weight  Height (cm): 154.9 (2020 06:17)  Weight (kg): 66.4 (2020 05:00)  BMI (kg/m2): 27.7 (2020 05:00)  BSA (m2): 1.65 (2020 05:00)    Bilirubin Total, Serum: 2.8 mg/dL ( 08:24)    CAPILLARY BLOOD GLUCOSE              MEDICATIONS  aspirin enteric coated 325 milliGRAM(s) Oral daily  atorvastatin 40 milliGRAM(s) Oral at bedtime  chlorhexidine 2% Cloths 1 Application(s) Topical <User Schedule>  enoxaparin Injectable 40 milliGRAM(s) SubCutaneous daily  metoprolol succinate  milliGRAM(s) Oral daily  pantoprazole    Tablet 40 milliGRAM(s) Oral before breakfast  polyethylene glycol 3350 17 Gram(s) Oral daily  sodium chloride 0.9%. 1000 milliLiter(s) IV Continuous <Continuous>      >>> <<<  PHYSICAL EXAM  Subjective: talkative, flight of ideas, AOx3  Neurology: alert and oriented x 3, nonfocal, no gross deficits  CV :s1s2  Sternal Wound :  CDI , Stable  Lungs:  Abdomen: soft, NT,ND, ( )BM  :  voiding  Extremities:       LABS      142  |  104  |  17  ----------------------------<  106<H>  3.6   |  25  |  0.60    Ca    9.3      2020 08:24  Mg     2.6     11-06    TPro  6.5  /  Alb  4.3  /  TBili  2.8<H>  /  DBili  x   /  AST  32  /  ALT  25  /  AlkPhos  94  -                                 11.0   10.07 )-----------( 135      ( 2020 08:24 )             34.6                 PAST MEDICAL & SURGICAL HISTORY:  Former smoker  during teenage years    Colon Cancer  s/p colon rection 3/25/09 no recurrence to date, did not require chemo nor radiation    Thyroid Nodule  benign since     Hernia, Umbilical  surgically repaired  during colon resection  amd again repaired in  with mesh    History of Ovarian Cyst  left    Single Renal Cyst  right 10cm (per report)    Functional Murmur  childhood polio and rheumatic fever    H/O: Hypertension    Right Facial Numbness  s/p repair of right facial muscle 30 yrs ago    S/P Colonoscopy with Polypectomy    S/P Endoscopy    S/P Colon Resection    S/P Tubal Ligation    S/P [facility] Biopsy of Cervix  due to abnormal PAP  and           VITAL SIGNS    Telemetry:  ectopic atrial 90's  Vital Signs Last 24 Hrs  T(C): 36.6 (20 @ 12:27), Max: 37.6 (20 @ 10:00)  T(F): 97.9 (20 @ 12:27), Max: 99.6 (20 @ 10:00)  HR: 95 (20 12:27) (82 - 100)  BP: 122/75 (20 12:27) (116/71 - 145/65)  RR: 18 (20 @ 12:27) (16 - 19)  SpO2: 96% (20 @ 12:27) (92% - 96%)             07:01  -   @ 07:00  --------------------------------------------------------  IN: 1160 mL / OUT: 1260 mL / NET: -100 mL     @ 07:01  -   @ 13:13  --------------------------------------------------------  IN: 240 mL / OUT: 0 mL / NET: 240 mL       Daily     Daily Weight in k (2020 09:50)  Admit Wt: Drug Dosing Weight  Height (cm): 154.9 (2020 06:17)  Weight (kg): 66.4 (2020 05:00)  BMI (kg/m2): 27.7 (2020 05:00)  BSA (m2): 1.65 (2020 05:00)    Bilirubin Total, Serum: 2.8 mg/dL ( 08:24)    CAPILLARY BLOOD GLUCOSE              MEDICATIONS  aspirin enteric coated 325 milliGRAM(s) Oral daily  atorvastatin 40 milliGRAM(s) Oral at bedtime  chlorhexidine 2% Cloths 1 Application(s) Topical <User Schedule>  enoxaparin Injectable 40 milliGRAM(s) SubCutaneous daily  metoprolol succinate  milliGRAM(s) Oral daily  pantoprazole    Tablet 40 milliGRAM(s) Oral before breakfast  polyethylene glycol 3350 17 Gram(s) Oral daily  sodium chloride 0.9%. 1000 milliLiter(s) IV Continuous <Continuous>      >>> <<<  PHYSICAL EXAM  Subjective: talkative, flight of ideas, AOx3  Neurology: alert and oriented x 3, nonfocal, no gross deficits  CV :s1s2  Sternal Wound :  CDI , Stable  Lungs:  Abdomen: soft, NT,ND, ( )BM  :  voiding  Extremities:   -c/c/e    LABS  -    142  |  104  |  17  ----------------------------<  106<H>  3.6   |  25  |  0.60    Ca    9.3      2020 08:24  Mg     2.6     11-06    TPro  6.5  /  Alb  4.3  /  TBili  2.8<H>  /  DBili  x   /  AST  32  /  ALT  25  /  AlkPhos  94  11-07                                 11.0   10.07 )-----------( 135      ( 2020 08:24 )             34.6                 PAST MEDICAL & SURGICAL HISTORY:  Former smoker  during teenage years    Colon Cancer  s/p colon rection 3/25/09 no recurrence to date, did not require chemo nor radiation    Thyroid Nodule  benign since     Hernia, Umbilical  surgically repaired  during colon resection  amd again repaired in  with mesh    History of Ovarian Cyst  left    Single Renal Cyst  right 10cm (per report)    Functional Murmur  childhood polio and rheumatic fever    H/O: Hypertension    Right Facial Numbness  s/p repair of right facial muscle 30 yrs ago    S/P Colonoscopy with Polypectomy    S/P Endoscopy    S/P Colon Resection    S/P Tubal Ligation    S/P [facility] Biopsy of Cervix  due to abnormal PAP  and

## 2020-11-07 NOTE — PROGRESS NOTE ADULT - PROBLEM SELECTOR PLAN 1
Transferred to 2C step down  De-intensify:  Remove IJ  +pacing wires to EPM  follow up AM labs: Keep K>4.0  Mg>2.0	  progressive ambulation  pulmonary toileting/incentive spirometry  pain management  restart appropriate home medications
Transferred to 2C step down    follow up AM labs: Keep K>4.0  Mg>2.0	  progressive ambulation  pulmonary toileting/incentive spirometry  pain management  restart appropriate home medications
Transferred to 2C step down  De-intensify:  Remove IJ  +pacing wires to EPM  follow up AM labs: Keep K>4.0  Mg>2.0	  progressive ambulation  pulmonary toileting/incentive spirometry  pain management  restart appropriate home medications

## 2020-11-07 NOTE — PROGRESS NOTE ADULT - PROBLEM SELECTOR PLAN 3
trend drainage  daily XR
ASA continued for graft occlusion-thromboembolism prophylaxis  Lipitor was also started for long term graft patency  Lopressor initiated for tachycardia and atrial fibrillation prophylaxis  VTE prophylaxis with SCD+ lovenox  PPI maintained for GI bleeding prophylaxis
trend drainage  daily XR

## 2020-11-08 ENCOUNTER — TRANSCRIPTION ENCOUNTER (OUTPATIENT)
Age: 75
End: 2020-11-08

## 2020-11-08 VITALS
OXYGEN SATURATION: 95 % | HEART RATE: 87 BPM | SYSTOLIC BLOOD PRESSURE: 121 MMHG | RESPIRATION RATE: 18 BRPM | TEMPERATURE: 98 F | DIASTOLIC BLOOD PRESSURE: 77 MMHG

## 2020-11-08 LAB
ALBUMIN SERPL ELPH-MCNC: 4.2 G/DL — SIGNIFICANT CHANGE UP (ref 3.3–5)
ALP SERPL-CCNC: 108 U/L — SIGNIFICANT CHANGE UP (ref 40–120)
ALT FLD-CCNC: 36 U/L — SIGNIFICANT CHANGE UP (ref 10–45)
ANION GAP SERPL CALC-SCNC: 10 MMOL/L — SIGNIFICANT CHANGE UP (ref 5–17)
AST SERPL-CCNC: 34 U/L — SIGNIFICANT CHANGE UP (ref 10–40)
BASOPHILS # BLD AUTO: 0.06 K/UL — SIGNIFICANT CHANGE UP (ref 0–0.2)
BASOPHILS NFR BLD AUTO: 0.8 % — SIGNIFICANT CHANGE UP (ref 0–2)
BILIRUB SERPL-MCNC: 2.4 MG/DL — HIGH (ref 0.2–1.2)
BUN SERPL-MCNC: 24 MG/DL — HIGH (ref 7–23)
CALCIUM SERPL-MCNC: 9.5 MG/DL — SIGNIFICANT CHANGE UP (ref 8.4–10.5)
CHLORIDE SERPL-SCNC: 109 MMOL/L — HIGH (ref 96–108)
CO2 SERPL-SCNC: 27 MMOL/L — SIGNIFICANT CHANGE UP (ref 22–31)
CREAT SERPL-MCNC: 0.69 MG/DL — SIGNIFICANT CHANGE UP (ref 0.5–1.3)
EOSINOPHIL # BLD AUTO: 0.28 K/UL — SIGNIFICANT CHANGE UP (ref 0–0.5)
EOSINOPHIL NFR BLD AUTO: 3.7 % — SIGNIFICANT CHANGE UP (ref 0–6)
GLUCOSE SERPL-MCNC: 107 MG/DL — HIGH (ref 70–99)
HCT VFR BLD CALC: 32.8 % — LOW (ref 34.5–45)
HGB BLD-MCNC: 10.5 G/DL — LOW (ref 11.5–15.5)
IMM GRANULOCYTES NFR BLD AUTO: 0.5 % — SIGNIFICANT CHANGE UP (ref 0–1.5)
LYMPHOCYTES # BLD AUTO: 1.14 K/UL — SIGNIFICANT CHANGE UP (ref 1–3.3)
LYMPHOCYTES # BLD AUTO: 15.2 % — SIGNIFICANT CHANGE UP (ref 13–44)
MCHC RBC-ENTMCNC: 23.8 PG — LOW (ref 27–34)
MCHC RBC-ENTMCNC: 32 GM/DL — SIGNIFICANT CHANGE UP (ref 32–36)
MCV RBC AUTO: 74.2 FL — LOW (ref 80–100)
MONOCYTES # BLD AUTO: 0.71 K/UL — SIGNIFICANT CHANGE UP (ref 0–0.9)
MONOCYTES NFR BLD AUTO: 9.5 % — SIGNIFICANT CHANGE UP (ref 2–14)
NEUTROPHILS # BLD AUTO: 5.28 K/UL — SIGNIFICANT CHANGE UP (ref 1.8–7.4)
NEUTROPHILS NFR BLD AUTO: 70.3 % — SIGNIFICANT CHANGE UP (ref 43–77)
NRBC # BLD: 0 /100 WBCS — SIGNIFICANT CHANGE UP (ref 0–0)
PLATELET # BLD AUTO: 159 K/UL — SIGNIFICANT CHANGE UP (ref 150–400)
POTASSIUM SERPL-MCNC: 4.1 MMOL/L — SIGNIFICANT CHANGE UP (ref 3.5–5.3)
POTASSIUM SERPL-SCNC: 4.1 MMOL/L — SIGNIFICANT CHANGE UP (ref 3.5–5.3)
PROT SERPL-MCNC: 6.3 G/DL — SIGNIFICANT CHANGE UP (ref 6–8.3)
RBC # BLD: 4.42 M/UL — SIGNIFICANT CHANGE UP (ref 3.8–5.2)
RBC # FLD: 19.9 % — HIGH (ref 10.3–14.5)
SODIUM SERPL-SCNC: 146 MMOL/L — HIGH (ref 135–145)
WBC # BLD: 7.51 K/UL — SIGNIFICANT CHANGE UP (ref 3.8–10.5)
WBC # FLD AUTO: 7.51 K/UL — SIGNIFICANT CHANGE UP (ref 3.8–10.5)

## 2020-11-08 PROCEDURE — 86923 COMPATIBILITY TEST ELECTRIC: CPT

## 2020-11-08 PROCEDURE — 86900 BLOOD TYPING SEROLOGIC ABO: CPT

## 2020-11-08 PROCEDURE — 94002 VENT MGMT INPAT INIT DAY: CPT

## 2020-11-08 PROCEDURE — 83605 ASSAY OF LACTIC ACID: CPT

## 2020-11-08 PROCEDURE — 82947 ASSAY GLUCOSE BLOOD QUANT: CPT

## 2020-11-08 PROCEDURE — 84484 ASSAY OF TROPONIN QUANT: CPT

## 2020-11-08 PROCEDURE — 82803 BLOOD GASES ANY COMBINATION: CPT

## 2020-11-08 PROCEDURE — 82565 ASSAY OF CREATININE: CPT

## 2020-11-08 PROCEDURE — 82962 GLUCOSE BLOOD TEST: CPT

## 2020-11-08 PROCEDURE — 93005 ELECTROCARDIOGRAM TRACING: CPT

## 2020-11-08 PROCEDURE — 85014 HEMATOCRIT: CPT

## 2020-11-08 PROCEDURE — C1751: CPT

## 2020-11-08 PROCEDURE — 86901 BLOOD TYPING SEROLOGIC RH(D): CPT

## 2020-11-08 PROCEDURE — P9045: CPT

## 2020-11-08 PROCEDURE — P9016: CPT

## 2020-11-08 PROCEDURE — 85610 PROTHROMBIN TIME: CPT

## 2020-11-08 PROCEDURE — 86850 RBC ANTIBODY SCREEN: CPT

## 2020-11-08 PROCEDURE — P9047: CPT

## 2020-11-08 PROCEDURE — 82553 CREATINE MB FRACTION: CPT

## 2020-11-08 PROCEDURE — 83735 ASSAY OF MAGNESIUM: CPT

## 2020-11-08 PROCEDURE — 36430 TRANSFUSION BLD/BLD COMPNT: CPT

## 2020-11-08 PROCEDURE — 85730 THROMBOPLASTIN TIME PARTIAL: CPT

## 2020-11-08 PROCEDURE — 97161 PT EVAL LOW COMPLEX 20 MIN: CPT

## 2020-11-08 PROCEDURE — 82550 ASSAY OF CK (CPK): CPT

## 2020-11-08 PROCEDURE — 86891 AUTOLOGOUS BLOOD OP SALVAGE: CPT

## 2020-11-08 PROCEDURE — 85025 COMPLETE CBC W/AUTO DIFF WBC: CPT

## 2020-11-08 PROCEDURE — 85018 HEMOGLOBIN: CPT

## 2020-11-08 PROCEDURE — 85384 FIBRINOGEN ACTIVITY: CPT

## 2020-11-08 PROCEDURE — C1769: CPT

## 2020-11-08 PROCEDURE — 71045 X-RAY EXAM CHEST 1 VIEW: CPT

## 2020-11-08 PROCEDURE — 84295 ASSAY OF SERUM SODIUM: CPT

## 2020-11-08 PROCEDURE — 84100 ASSAY OF PHOSPHORUS: CPT

## 2020-11-08 PROCEDURE — 82330 ASSAY OF CALCIUM: CPT

## 2020-11-08 PROCEDURE — C1889: CPT

## 2020-11-08 PROCEDURE — 82435 ASSAY OF BLOOD CHLORIDE: CPT

## 2020-11-08 PROCEDURE — 80053 COMPREHEN METABOLIC PANEL: CPT

## 2020-11-08 PROCEDURE — 84132 ASSAY OF SERUM POTASSIUM: CPT

## 2020-11-08 PROCEDURE — 85027 COMPLETE CBC AUTOMATED: CPT

## 2020-11-08 RX ORDER — ACETAMINOPHEN 500 MG
2 TABLET ORAL
Qty: 56 | Refills: 0
Start: 2020-11-08 | End: 2020-11-14

## 2020-11-08 RX ORDER — SENNA PLUS 8.6 MG/1
2 TABLET ORAL
Qty: 6 | Refills: 0
Start: 2020-11-08 | End: 2020-11-10

## 2020-11-08 RX ORDER — METOPROLOL TARTRATE 50 MG
1 TABLET ORAL
Qty: 30 | Refills: 0
Start: 2020-11-08 | End: 2020-12-07

## 2020-11-08 RX ORDER — PANTOPRAZOLE SODIUM 20 MG/1
1 TABLET, DELAYED RELEASE ORAL
Qty: 30 | Refills: 0
Start: 2020-11-08 | End: 2020-12-07

## 2020-11-08 RX ORDER — ASPIRIN/CALCIUM CARB/MAGNESIUM 324 MG
1 TABLET ORAL
Qty: 30 | Refills: 0
Start: 2020-11-08 | End: 2020-12-07

## 2020-11-08 RX ORDER — ATORVASTATIN CALCIUM 80 MG/1
1 TABLET, FILM COATED ORAL
Qty: 0 | Refills: 0 | DISCHARGE

## 2020-11-08 RX ORDER — ATORVASTATIN CALCIUM 80 MG/1
1 TABLET, FILM COATED ORAL
Qty: 30 | Refills: 0
Start: 2020-11-08 | End: 2020-12-07

## 2020-11-08 RX ORDER — OMEPRAZOLE 10 MG/1
1 CAPSULE, DELAYED RELEASE ORAL
Qty: 0 | Refills: 0 | DISCHARGE

## 2020-11-08 RX ORDER — ASPIRIN/CALCIUM CARB/MAGNESIUM 324 MG
1 TABLET ORAL
Qty: 0 | Refills: 0 | DISCHARGE

## 2020-11-08 RX ADMIN — CHLORHEXIDINE GLUCONATE 1 APPLICATION(S): 213 SOLUTION TOPICAL at 05:57

## 2020-11-08 RX ADMIN — PANTOPRAZOLE SODIUM 40 MILLIGRAM(S): 20 TABLET, DELAYED RELEASE ORAL at 06:13

## 2020-11-08 RX ADMIN — Medication 100 MILLIGRAM(S): at 06:13

## 2020-11-08 RX ADMIN — Medication 325 MILLIGRAM(S): at 11:18

## 2020-11-08 NOTE — DISCHARGE NOTE PROVIDER - HOSPITAL COURSE
75 y.o Female with HTN, HLD, abnormal cath ( severe ostial LAD)   presents to PST for 3 Vessel CABG     Hospital course:  : C3L 1 intra-op PRBC Dobutamine  :  weaned to off, transferred to step down--displaying behavior of post-op delirium, combatitve, refusing assessment   Pt remains with mild confusion, inappropriate statements, however is aware of surroundings and hospitalization.  No focal deficits .  D/c intro, chest tubes   PW removed. Lopressor changed to Toprol . Oriented x 3 however speaking rapidly with a tendency to change subject frequently.    Replete    Delirium improved. Pt discharged home

## 2020-11-08 NOTE — DISCHARGE NOTE PROVIDER - NSDCMRMEDTOKEN_GEN_ALL_CORE_FT
Aspirin Enteric Coated 325 mg oral delayed release tablet: 1 tab(s) orally once a day  Lipitor 40 mg oral tablet: 1 tab(s) orally once a day (at bedtime)  pantoprazole 40 mg oral delayed release tablet: 1 tab(s) orally once a day (before a meal)  senna oral tablet: 2 tab(s) orally once a day, As Needed   Toprol- mg oral tablet, extended release: 1 tab(s) orally once a day  Tylenol 325 mg oral tablet: 2 tab(s) orally every 6 hours, As Needed

## 2020-11-08 NOTE — DISCHARGE NOTE PROVIDER - CARE PROVIDER_API CALL
Navdeep Trinh  SURGERY  [address] [place] [address]  Phone: [phone]  Fax: [phone]  Follow Up Time: 2 weeks    Mitali Shaw  CARDIOLOGY  [address] [place] [address], [address]  [place], [place] [id number]  Phone: [phone]  Fax: [phone]  Follow Up Time: 2 weeks

## 2020-11-08 NOTE — DISCHARGE NOTE PROVIDER - PROVIDER TOKENS
PROVIDER:[TOKEN:[[id number]],FOLLOWUP:[2 weeks]],PROVIDER:[TOKEN:[[id number]],FOLLOWUP:[2 weeks]]

## 2020-11-08 NOTE — DISCHARGE NOTE PROVIDER - NSDCPNSUBOBJ_GEN_ALL_CORE
VITAL SIGNS    Telemetry:  SR 70-80  Vital Signs Last 24 Hrs  T(C): 37.1 (20 @ 05:01), Max: 37.6 (20 @ 10:00)  T(F): 98.8 (20 @ 05:01), Max: 99.6 (20 @ 10:00)  HR: 75 (20 05:01) (75 - 95)  BP: 115/66 (20 @ 05:01) (115/66 - 143/73)  RR: 18 (20 @ 05:01) (16 - 18)  SpO2: 94% (20 @ 05:01) (93% - 96%)             07:01  -   07:00  --------------------------------------------------------  IN: 1130 mL / OUT: 650 mL / NET: 480 mL       Daily     Daily Weight in k.4 (2020 07:46)  Admit Wt: Drug Dosing Weight  Height (cm): 154.9 (2020 06:17)  Weight (kg): 66.4 (2020 05:00)  BMI (kg/m2): 27.7 (2020 05:00)  BSA (m2): 1.65 (2020 05:00)    Bilirubin Total, Serum: 2.4 mg/dL ( @ 05:38)    CAPILLARY BLOOD GLUCOSE              MEDICATIONS  aspirin enteric coated 325 milliGRAM(s) Oral daily  atorvastatin 40 milliGRAM(s) Oral at bedtime  chlorhexidine 2% Cloths 1 Application(s) Topical <User Schedule>  enoxaparin Injectable 40 milliGRAM(s) SubCutaneous daily  metoprolol succinate  milliGRAM(s) Oral daily  pantoprazole    Tablet 40 milliGRAM(s) Oral before breakfast  polyethylene glycol 3350 17 Gram(s) Oral daily  sodium chloride 0.9%. 1000 milliLiter(s) IV Continuous <Continuous>      >>> <<<  PHYSICAL EXAM  Subjective: NAD  Neurology: alert and oriented x 3, nonfocal, no gross deficits  CV : s1s2  Sternal Wound :  CDI , Stable  Lungs: CTA  Abdomen: soft, NT,ND, ( +)BM  :  voiding  Extremities:   -c/c/e    LABS      146<H>  |  109<H>  |  24<H>  ----------------------------<  107<H>  4.1   |  27  |  0.69    Ca    9.5      2020 05:38    TPro  6.3  /  Alb  4.2  /  TBili  2.4<H>  /  DBili  x   /  AST  34  /  ALT  36  /  AlkPhos  108                                   10.5   7.51  )-----------( 159      ( 2020 05:38 )             32.8                 PAST MEDICAL & SURGICAL HISTORY:  Former smoker  during teenage years    Colon Cancer  s/p colon rection 3/25/09 no recurrence to date, did not require chemo nor radiation    Thyroid Nodule  benign since     Hernia, Umbilical  surgically repaired  during colon resection  amd again repaired in  with mesh    History of Ovarian Cyst  left    Single Renal Cyst  right 10cm (per report)    Functional Murmur  childhood polio and rheumatic fever    H/O: Hypertension    Right Facial Numbness  s/p repair of right facial muscle 30 yrs ago    S/P Colonoscopy with Polypectomy    S/P Endoscopy    S/P Colon Resection    S/P Tubal Ligation    S/P [facility] Biopsy of Cervix  due to abnormal PAP  and

## 2020-11-08 NOTE — DISCHARGE NOTE PROVIDER - NSDCCPCAREPLAN_GEN_ALL_CORE_FT
PRINCIPAL DISCHARGE DIAGNOSIS  Diagnosis: S/P CABG x 3  Assessment and Plan of Treatment: S/P CABG x 3

## 2020-11-08 NOTE — DISCHARGE NOTE PROVIDER - NSDCFUADDINST_GEN_ALL_CORE_FT
Resume activity as tolerated  Resume low cholesterol low sodium diet  Shower daily and wash all incisions with soap and water  Ambulate at least four times daily  Use incentive spirometer every hour during the day  Record daily weight and report changes greater than 3lbs  Please follow up with your cardiologist in 7-10 days  Please follow up with Dr Trinh in 7-10 days. Please call for an appointment

## 2020-11-08 NOTE — DISCHARGE NOTE NURSING/CASE MANAGEMENT/SOCIAL WORK - PATIENT PORTAL LINK FT
You can access the FollowMyHealth Patient Portal offered by [facility] by registering at the following website: [email or web address]. By joining [employer]’s FollowMyHealth portal, you will also be able to view your health information using other applications (apps) compatible with our system.

## 2020-11-09 ENCOUNTER — NON-APPOINTMENT (OUTPATIENT)
Age: 75
End: 2020-11-09

## 2020-11-10 ENCOUNTER — APPOINTMENT (OUTPATIENT)
Dept: CARE COORDINATION | Facility: HOME HEALTH | Age: 75
End: 2020-11-10
Payer: MEDICARE

## 2020-11-10 PROCEDURE — 99024 POSTOP FOLLOW-UP VISIT: CPT

## 2020-11-10 RX ORDER — OMEPRAZOLE 20 MG/1
20 TABLET, DELAYED RELEASE ORAL
Refills: 0 | Status: DISCONTINUED | COMMUNITY
Start: 2020-10-26 | End: 2020-11-10

## 2020-11-10 RX ORDER — VALSARTAN AND HYDROCHLOROTHIAZIDE 160; 25 MG/1; MG/1
160-25 TABLET, FILM COATED ORAL DAILY
Qty: 90 | Refills: 3 | Status: DISCONTINUED | COMMUNITY
Start: 2020-10-26 | End: 2020-11-10

## 2020-11-10 RX ORDER — PANTOPRAZOLE 40 MG/1
40 TABLET, DELAYED RELEASE ORAL DAILY
Qty: 30 | Refills: 0 | Status: ACTIVE | COMMUNITY
Start: 2020-11-10

## 2020-11-10 RX ORDER — METOPROLOL TARTRATE 50 MG/1
50 TABLET, FILM COATED ORAL TWICE DAILY
Qty: 60 | Refills: 0 | Status: DISCONTINUED | COMMUNITY
Start: 2020-10-26 | End: 2020-11-10

## 2020-11-10 RX ORDER — ACETAMINOPHEN 325 MG/1
325 TABLET ORAL EVERY 6 HOURS
Refills: 0 | Status: ACTIVE | COMMUNITY
Start: 2020-11-10

## 2020-11-11 VITALS
SYSTOLIC BLOOD PRESSURE: 128 MMHG | RESPIRATION RATE: 16 BRPM | OXYGEN SATURATION: 96 % | DIASTOLIC BLOOD PRESSURE: 78 MMHG | HEART RATE: 88 BPM | BODY MASS INDEX: 27.96 KG/M2 | WEIGHT: 148 LBS

## 2020-11-11 NOTE — HISTORY OF PRESENT ILLNESS
[FreeTextEntry1] : 75F s/p cabg x 3 Dr. Trinh, had post op delirium and confusion requiring 1:1 observation.  Better now.  Of note,  had cabg one month ago.\par pt is c/o that she oesn't feel back to normal yet, explained complexity of surgery and need for time for recovery.\par \par covid screen done\par

## 2020-11-11 NOTE — PHYSICAL EXAM
[] : no respiratory distress [Respiration, Rhythm And Depth] : normal respiratory rhythm and effort [Auscultation Breath Sounds / Voice Sounds] : lungs were clear to auscultation bilaterally [Heart Rate And Rhythm] : heart rate was normal and rhythm regular [Heart Sounds] : normal S1 and S2 [FreeTextEntry1] : MSI, CT sites and SVG sites without erythema, drainage or warmth, with edges well approximated.  Sternum stable. BLE minimal edema [Bowel Sounds] : normal bowel sounds [Abdomen Soft] : soft [Abdomen Tenderness] : non-tender

## 2020-11-17 ENCOUNTER — INPATIENT (INPATIENT)
Facility: HOSPITAL | Age: 75
LOS: 1 days | Discharge: HOME HEALTH | DRG: 176 | End: 2020-11-19
Attending: THORACIC SURGERY (CARDIOTHORACIC VASCULAR SURGERY) | Admitting: THORACIC SURGERY (CARDIOTHORACIC VASCULAR SURGERY)
Payer: COMMERCIAL

## 2020-11-17 VITALS
OXYGEN SATURATION: 98 % | HEART RATE: 76 BPM | TEMPERATURE: 98 F | SYSTOLIC BLOOD PRESSURE: 132 MMHG | WEIGHT: 141.98 LBS | HEIGHT: 61 IN | DIASTOLIC BLOOD PRESSURE: 75 MMHG | RESPIRATION RATE: 18 BRPM

## 2020-11-17 LAB
ALBUMIN SERPL ELPH-MCNC: 4.2 G/DL — SIGNIFICANT CHANGE UP (ref 3.3–5)
ALP SERPL-CCNC: 121 U/L — HIGH (ref 40–120)
ALT FLD-CCNC: 20 U/L — SIGNIFICANT CHANGE UP (ref 10–45)
ANION GAP SERPL CALC-SCNC: 14 MMOL/L — SIGNIFICANT CHANGE UP (ref 5–17)
APTT BLD: 24.7 SEC — LOW (ref 27.5–35.5)
AST SERPL-CCNC: 23 U/L — SIGNIFICANT CHANGE UP (ref 10–40)
BASOPHILS # BLD AUTO: 0 K/UL — SIGNIFICANT CHANGE UP (ref 0–0.2)
BASOPHILS NFR BLD AUTO: 0 % — SIGNIFICANT CHANGE UP (ref 0–2)
BILIRUB SERPL-MCNC: 1.2 MG/DL — SIGNIFICANT CHANGE UP (ref 0.2–1.2)
BUN SERPL-MCNC: 17 MG/DL — SIGNIFICANT CHANGE UP (ref 7–23)
CALCIUM SERPL-MCNC: 9.6 MG/DL — SIGNIFICANT CHANGE UP (ref 8.4–10.5)
CHLORIDE SERPL-SCNC: 105 MMOL/L — SIGNIFICANT CHANGE UP (ref 96–108)
CK MB CFR SERPL CALC: 2.3 NG/ML — SIGNIFICANT CHANGE UP (ref 0–3.8)
CO2 SERPL-SCNC: 23 MMOL/L — SIGNIFICANT CHANGE UP (ref 22–31)
CREAT SERPL-MCNC: 0.82 MG/DL — SIGNIFICANT CHANGE UP (ref 0.5–1.3)
EOSINOPHIL # BLD AUTO: 0.7 K/UL — HIGH (ref 0–0.5)
EOSINOPHIL NFR BLD AUTO: 6.1 % — HIGH (ref 0–6)
GLUCOSE SERPL-MCNC: 105 MG/DL — HIGH (ref 70–99)
HCT VFR BLD CALC: 37.5 % — SIGNIFICANT CHANGE UP (ref 34.5–45)
HGB BLD-MCNC: 11.8 G/DL — SIGNIFICANT CHANGE UP (ref 11.5–15.5)
INR BLD: 1.11 RATIO — SIGNIFICANT CHANGE UP (ref 0.88–1.16)
LYMPHOCYTES # BLD AUTO: 0.99 K/UL — LOW (ref 1–3.3)
LYMPHOCYTES # BLD AUTO: 8.7 % — LOW (ref 13–44)
MCHC RBC-ENTMCNC: 23.6 PG — LOW (ref 27–34)
MCHC RBC-ENTMCNC: 31.5 GM/DL — LOW (ref 32–36)
MCV RBC AUTO: 74.9 FL — LOW (ref 80–100)
MONOCYTES # BLD AUTO: 0.79 K/UL — SIGNIFICANT CHANGE UP (ref 0–0.9)
MONOCYTES NFR BLD AUTO: 6.9 % — SIGNIFICANT CHANGE UP (ref 2–14)
NEUTROPHILS # BLD AUTO: 8.25 K/UL — HIGH (ref 1.8–7.4)
NEUTROPHILS NFR BLD AUTO: 72.2 % — SIGNIFICANT CHANGE UP (ref 43–77)
PLATELET # BLD AUTO: 402 K/UL — HIGH (ref 150–400)
POTASSIUM SERPL-MCNC: 4.7 MMOL/L — SIGNIFICANT CHANGE UP (ref 3.5–5.3)
POTASSIUM SERPL-SCNC: 4.7 MMOL/L — SIGNIFICANT CHANGE UP (ref 3.5–5.3)
PROT SERPL-MCNC: 6.8 G/DL — SIGNIFICANT CHANGE UP (ref 6–8.3)
PROTHROM AB SERPL-ACNC: 13.3 SEC — SIGNIFICANT CHANGE UP (ref 10.6–13.6)
RBC # BLD: 5.01 M/UL — SIGNIFICANT CHANGE UP (ref 3.8–5.2)
RBC # FLD: 19.9 % — HIGH (ref 10.3–14.5)
SARS-COV-2 RNA SPEC QL NAA+PROBE: SIGNIFICANT CHANGE UP
SODIUM SERPL-SCNC: 142 MMOL/L — SIGNIFICANT CHANGE UP (ref 135–145)
TROPONIN T, HIGH SENSITIVITY RESULT: 25 NG/L — SIGNIFICANT CHANGE UP (ref 0–51)
TROPONIN T, HIGH SENSITIVITY RESULT: 26 NG/L — SIGNIFICANT CHANGE UP (ref 0–51)
WBC # BLD: 11.42 K/UL — HIGH (ref 3.8–10.5)
WBC # FLD AUTO: 11.42 K/UL — HIGH (ref 3.8–10.5)

## 2020-11-17 PROCEDURE — 93308 TTE F-UP OR LMTD: CPT | Mod: 26

## 2020-11-17 PROCEDURE — 99285 EMERGENCY DEPT VISIT HI MDM: CPT

## 2020-11-17 PROCEDURE — 71045 X-RAY EXAM CHEST 1 VIEW: CPT | Mod: 26

## 2020-11-17 PROCEDURE — 71275 CT ANGIOGRAPHY CHEST: CPT | Mod: 26

## 2020-11-17 NOTE — ED ADULT NURSE NOTE - ED STAT RN HANDOFF DETAILS
Bedside report given to on coming nurse Zeynep. Understands pmh, medications given and plan of care for patient. Patient in stable condition, vital signs updated, has no complaints at this time and has been updated on care plan. Explained to patient that it is change of shift and new nurse is taking over, pt verbalized understanding.

## 2020-11-17 NOTE — ED PROVIDER NOTE - OBJECTIVE STATEMENT
75y f PMHx HTN, HLD, s/p CABGx3 (11/4/20) p/w CP. Pt reports midsternal chest pressure this am felt while walking with associated MAY, NANCY and dry cough. called Dr. Trinh's office today and was told to come in. Took ASA 81mg this am. expressed concern she has to take care of her  who also had recent CABG. Denies fever, chills, hemoptysis, unilateral leg swelling, recent travel, numbness/weakness in extremities, dizziness, or LOC. no known COVID contacts.

## 2020-11-17 NOTE — ED PROVIDER NOTE - CLINICAL SUMMARY MEDICAL DECISION MAKING FREE TEXT BOX
Attending MD Quijano:  75F with CAD sp CABG 11/9 presenting with chest pressure and MAY x 1 day, maybe also with mild cough as well. ECG on arrival without diagnostic ischemic changes, POCUS with no clinically significant pericardial effusion. Vitals unrevealing. Plan for biomarkers to r/o MI, labs to r/o critical anemia, CXR, CTA to r/o PE, CT surgery consultation      *The above represents an initial assessment/impression. Please refer to progress notes for potential changes in patient clinical course*

## 2020-11-17 NOTE — ED PROVIDER NOTE - PROGRESS NOTE DETAILS
case d/w CTsurgery NP - team aware of pt, will see in ED - Terrell Gage PA-C Spoke with CT surgey, dispo pending CTA, if not PE recommend DC home with Lasix 40mg daily and Potassium 20mg daily both for 7 days.  Patient has an appointment monday for follow up.  Will page Ct surg with result of CTA [place] [phone]

## 2020-11-17 NOTE — ED ADULT NURSE NOTE - MUSCULOSKELETAL ASSESSMENT
- - -
I personally performed the services described in the documentation, reviewed the documentation recorded by the scribe in my presence and it accurately and completely records my words and action.

## 2020-11-17 NOTE — ED PROVIDER NOTE - PSH
Right Facial Numbness  s/p repair of right facial muscle 30 yrs ago  S/P Colon Resection    S/P Colonoscopy with Polypectomy    S/P [facility] Biopsy of Cervix  due to abnormal PAP 1994 and 2008  S/P Endoscopy    S/P Tubal Ligation

## 2020-11-17 NOTE — ED PROVIDER NOTE - ATTENDING CONTRIBUTION TO CARE
Attending MD Quijano:   I personally have seen and examined this patient.  Physician assistant note reviewed and agree on plan of care and except where noted.  See HPI, PE, and MDM for details.

## 2020-11-17 NOTE — ED ADULT NURSE REASSESSMENT NOTE - NS ED NURSE REASSESS COMMENT FT1
Pt introduced to oncoming RN and updated on plan of care. A&Ox4, breathing unlabored, resting comfortably in bed, VSS. Pt denies any complaints at this time. Call bell in reach pt educated on use, bed in lowest position.

## 2020-11-17 NOTE — ED ADULT NURSE NOTE - OBJECTIVE STATEMENT
76 y/o female patient presents ambulatory to the ED with c/o worsening midsternal chest pressure while walking associated with shortness of breath and dry cough. Patient spoke with cardiologist office Dr. Trinh, and advised to come to the ED for further evaluation. Patient states she took 81mg aspirin. Patient denies N/V/D; afebrile in ED. PMHC HTN, HLD, CABG x 3 (11/4/2020)

## 2020-11-17 NOTE — ED PROVIDER NOTE - PMH
Colon Cancer  s/p colon rection 3/25/09 no recurrence to date, did not require chemo nor radiation  Former smoker  during teenage years  Functional Murmur  childhood polio and rheumatic fever  H/O: Hypertension    Hernia, Umbilical  surgically repaired 2009 during colon resection 2009 amd again repaired in 2010 with mesh  History of Ovarian Cyst  left  Single Renal Cyst  right 10cm (per report)  Thyroid Nodule  benign since 08

## 2020-11-18 DIAGNOSIS — I26.99 OTHER PULMONARY EMBOLISM WITHOUT ACUTE COR PULMONALE: ICD-10-CM

## 2020-11-18 DIAGNOSIS — Z95.1 PRESENCE OF AORTOCORONARY BYPASS GRAFT: ICD-10-CM

## 2020-11-18 DIAGNOSIS — J90 PLEURAL EFFUSION, NOT ELSEWHERE CLASSIFIED: ICD-10-CM

## 2020-11-18 LAB
ANION GAP SERPL CALC-SCNC: 17 MMOL/L — SIGNIFICANT CHANGE UP (ref 5–17)
APTT BLD: 53.2 SEC — HIGH (ref 27.5–35.5)
APTT BLD: 87.5 SEC — HIGH (ref 27.5–35.5)
BUN SERPL-MCNC: 12 MG/DL — SIGNIFICANT CHANGE UP (ref 7–23)
CALCIUM SERPL-MCNC: 9.5 MG/DL — SIGNIFICANT CHANGE UP (ref 8.4–10.5)
CHLORIDE SERPL-SCNC: 102 MMOL/L — SIGNIFICANT CHANGE UP (ref 96–108)
CO2 SERPL-SCNC: 22 MMOL/L — SIGNIFICANT CHANGE UP (ref 22–31)
CREAT SERPL-MCNC: 0.67 MG/DL — SIGNIFICANT CHANGE UP (ref 0.5–1.3)
GLUCOSE SERPL-MCNC: 121 MG/DL — HIGH (ref 70–99)
HCT VFR BLD CALC: 39.3 % — SIGNIFICANT CHANGE UP (ref 34.5–45)
HGB BLD-MCNC: 12.2 G/DL — SIGNIFICANT CHANGE UP (ref 11.5–15.5)
MCHC RBC-ENTMCNC: 23.1 PG — LOW (ref 27–34)
MCHC RBC-ENTMCNC: 31 GM/DL — LOW (ref 32–36)
MCV RBC AUTO: 74.6 FL — LOW (ref 80–100)
NRBC # BLD: 0 /100 WBCS — SIGNIFICANT CHANGE UP (ref 0–0)
PLATELET # BLD AUTO: 390 K/UL — SIGNIFICANT CHANGE UP (ref 150–400)
POTASSIUM SERPL-MCNC: 3.8 MMOL/L — SIGNIFICANT CHANGE UP (ref 3.5–5.3)
POTASSIUM SERPL-SCNC: 3.8 MMOL/L — SIGNIFICANT CHANGE UP (ref 3.5–5.3)
RBC # BLD: 5.27 M/UL — HIGH (ref 3.8–5.2)
RBC # FLD: 19.9 % — HIGH (ref 10.3–14.5)
SODIUM SERPL-SCNC: 141 MMOL/L — SIGNIFICANT CHANGE UP (ref 135–145)
WBC # BLD: 9.38 K/UL — SIGNIFICANT CHANGE UP (ref 3.8–10.5)
WBC # FLD AUTO: 9.38 K/UL — SIGNIFICANT CHANGE UP (ref 3.8–10.5)

## 2020-11-18 PROCEDURE — 99024 POSTOP FOLLOW-UP VISIT: CPT

## 2020-11-18 PROCEDURE — 93306 TTE W/DOPPLER COMPLETE: CPT | Mod: 26

## 2020-11-18 PROCEDURE — 99223 1ST HOSP IP/OBS HIGH 75: CPT

## 2020-11-18 RX ORDER — POTASSIUM CHLORIDE 20 MEQ
20 PACKET (EA) ORAL DAILY
Refills: 0 | Status: DISCONTINUED | OUTPATIENT
Start: 2020-11-18 | End: 2020-11-19

## 2020-11-18 RX ORDER — ATORVASTATIN CALCIUM 80 MG/1
40 TABLET, FILM COATED ORAL AT BEDTIME
Refills: 0 | Status: DISCONTINUED | OUTPATIENT
Start: 2020-11-18 | End: 2020-11-19

## 2020-11-18 RX ORDER — INFLUENZA VIRUS VACCINE 15; 15; 15; 15 UG/.5ML; UG/.5ML; UG/.5ML; UG/.5ML
0.5 SUSPENSION INTRAMUSCULAR ONCE
Refills: 0 | Status: DISCONTINUED | OUTPATIENT
Start: 2020-11-18 | End: 2020-11-19

## 2020-11-18 RX ORDER — APIXABAN 2.5 MG/1
5 TABLET, FILM COATED ORAL EVERY 12 HOURS
Refills: 0 | Status: DISCONTINUED | OUTPATIENT
Start: 2020-11-18 | End: 2020-11-19

## 2020-11-18 RX ORDER — FUROSEMIDE 40 MG
40 TABLET ORAL DAILY
Refills: 0 | Status: DISCONTINUED | OUTPATIENT
Start: 2020-11-18 | End: 2020-11-19

## 2020-11-18 RX ORDER — HEPARIN SODIUM 5000 [USP'U]/ML
INJECTION INTRAVENOUS; SUBCUTANEOUS
Qty: 25000 | Refills: 0 | Status: DISCONTINUED | OUTPATIENT
Start: 2020-11-18 | End: 2020-11-18

## 2020-11-18 RX ORDER — METOPROLOL TARTRATE 50 MG
100 TABLET ORAL DAILY
Refills: 0 | Status: DISCONTINUED | OUTPATIENT
Start: 2020-11-18 | End: 2020-11-19

## 2020-11-18 RX ORDER — ASPIRIN/CALCIUM CARB/MAGNESIUM 324 MG
325 TABLET ORAL DAILY
Refills: 0 | Status: DISCONTINUED | OUTPATIENT
Start: 2020-11-18 | End: 2020-11-18

## 2020-11-18 RX ORDER — SENNA PLUS 8.6 MG/1
2 TABLET ORAL AT BEDTIME
Refills: 0 | Status: DISCONTINUED | OUTPATIENT
Start: 2020-11-18 | End: 2020-11-19

## 2020-11-18 RX ORDER — ASPIRIN/CALCIUM CARB/MAGNESIUM 324 MG
81 TABLET ORAL DAILY
Refills: 0 | Status: DISCONTINUED | OUTPATIENT
Start: 2020-11-19 | End: 2020-11-19

## 2020-11-18 RX ORDER — POTASSIUM CHLORIDE 20 MEQ
20 PACKET (EA) ORAL ONCE
Refills: 0 | Status: COMPLETED | OUTPATIENT
Start: 2020-11-18 | End: 2020-11-18

## 2020-11-18 RX ORDER — HEPARIN SODIUM 5000 [USP'U]/ML
5000 INJECTION INTRAVENOUS; SUBCUTANEOUS ONCE
Refills: 0 | Status: COMPLETED | OUTPATIENT
Start: 2020-11-18 | End: 2020-11-18

## 2020-11-18 RX ORDER — PANTOPRAZOLE SODIUM 20 MG/1
40 TABLET, DELAYED RELEASE ORAL
Refills: 0 | Status: DISCONTINUED | OUTPATIENT
Start: 2020-11-18 | End: 2020-11-19

## 2020-11-18 RX ORDER — METOPROLOL TARTRATE 50 MG
100 TABLET ORAL DAILY
Refills: 0 | Status: DISCONTINUED | OUTPATIENT
Start: 2020-11-18 | End: 2020-11-18

## 2020-11-18 RX ADMIN — ATORVASTATIN CALCIUM 40 MILLIGRAM(S): 80 TABLET, FILM COATED ORAL at 22:03

## 2020-11-18 RX ADMIN — HEPARIN SODIUM 1200 UNIT(S)/HR: 5000 INJECTION INTRAVENOUS; SUBCUTANEOUS at 09:47

## 2020-11-18 RX ADMIN — Medication 20 MILLIEQUIVALENT(S): at 09:49

## 2020-11-18 RX ADMIN — HEPARIN SODIUM 5000 UNIT(S): 5000 INJECTION INTRAVENOUS; SUBCUTANEOUS at 01:54

## 2020-11-18 RX ADMIN — PANTOPRAZOLE SODIUM 40 MILLIGRAM(S): 20 TABLET, DELAYED RELEASE ORAL at 05:33

## 2020-11-18 RX ADMIN — HEPARIN SODIUM 1100 UNIT(S)/HR: 5000 INJECTION INTRAVENOUS; SUBCUTANEOUS at 01:54

## 2020-11-18 RX ADMIN — Medication 40 MILLIGRAM(S): at 05:33

## 2020-11-18 RX ADMIN — APIXABAN 5 MILLIGRAM(S): 2.5 TABLET, FILM COATED ORAL at 17:31

## 2020-11-18 RX ADMIN — SENNA PLUS 2 TABLET(S): 8.6 TABLET ORAL at 22:03

## 2020-11-18 RX ADMIN — Medication 100 MILLIGRAM(S): at 05:33

## 2020-11-18 RX ADMIN — Medication 20 MILLIEQUIVALENT(S): at 17:31

## 2020-11-18 RX ADMIN — Medication 325 MILLIGRAM(S): at 11:09

## 2020-11-18 NOTE — CONSULT NOTE ADULT - ATTENDING COMMENTS
Patient has a small PE and below the knee DVT  Seems provoked in nature  Would treat for at least 3 months with anticoagulation  At 7pm, start eliquis 5mg BID and stop heparin gtt  Reduce dose of aspirin from 325 --> 81mg daily   d/w Dr. Trinh  Likely dc tomorrow    [facility] [phone]

## 2020-11-18 NOTE — PROGRESS NOTE ADULT - PROBLEM SELECTOR PLAN 3
Resume home dose of  mg QD, Atorvastatin 40 mg qhs & Toprol 100 mg QD  Encourage incentive spirometry, ambulate as tolerated Continue  mg QD, Atorvastatin 40 mg qhs & Toprol 100 mg QD  Encourage incentive spirometry, ambulate as tolerated Continue  mg QD, Atorvastatin 40 mg qhs & Toprol 100 mg QD  Encourage incentive spirometry, ambulate as tolerated  F/U Dr. Trinh on Monday 11/30 at 9AM

## 2020-11-18 NOTE — H&P ADULT - NSHPREVIEWOFSYSTEMS_GEN_ALL_CORE
REVIEW OF SYSTEMS:  CONSTITUTIONAL: Denies fever, weight loss or fatigue  EYES: Denies eye pain, visual disturbances, or discharge  ENMT:  Denies difficulty hearing, tinnitus, vertigo, sinus or throat pain  NECK: Denies pain or stiffness  RESPIRATORY: (+) dry cough, (+) MAY, Denies wheezing, chills or hemoptysis.  CARDIOVASCULAR: (+) chest pressure, Denies palpitations, dizziness, or leg swelling  GASTROINTESTINAL: Denies abdominal or epigastric pain, nausea, vomiting, hematemesis, diarrhea or melena  GENITOURINARY: Denies dysuria, frequency, hematuria, or incontinence  NEUROLOGICAL: Denies headaches, memory loss, loss of strength, numbness or tremors  SKIN: Denies itching, burning, rashes, or lesions   LYMPH NODES: Denies enlarged glands  ENDOCRINE: Denies heat or cold intolerance or hair loss  MUSCULOSKELETAL: Denies joint pain or swelling, muscle, back or extremity pain  PSYCHIATRIC: Denies depression, anxiety, mood swings or difficulty sleeping  HEME/LYMPH: Denies easy bruising or bleeding gums  ALLERGY: Denies hives or eczema

## 2020-11-18 NOTE — H&P ADULT - PROBLEM SELECTOR PLAN 3
Resume home dose of  mg QD, Atorvastatin 40 mg qhs & Toprol 100 mg QD  Encourage incentive spirometry and ambulation Resume home dose of  mg QD, Atorvastatin 40 mg qhs & Toprol 100 mg QD  Encourage incentive spirometry, ambulate as tolerated

## 2020-11-18 NOTE — H&P ADULT - HISTORY OF PRESENT ILLNESS
76 y/o F w/ PMHx of HTN, HLD and CAD s/p C3L w/ Dr. Trinh on 11/4/20 (d/c'd home 11/8) presented to [facility] ED on 11/17 c/o chest pressure, MAY and dry cough x 2 days.   Pt reports that the chest pressure is midsternal, nonradiating and has been constant since its onset. She states that she was able to walk her dogs without      . Pt reports midsternal chest pressure this am felt while walking with associated MAY, NANCY and dry cough. called Dr. Trinh's office today and was told to come in. Took ASA 81mg this am. expressed concern she has to take care of her  who also had recent CABG. Denies fever, chills, hemoptysis, unilateral leg swelling, recent travel, numbness/weakness in extremities, dizziness, or LOC. no known COVID contacts. 76 y/o F w/ PMHx of HTN, HLD and CAD s/p C3L w/ Dr. Trinh on 11/4/20 (d/c'd home 11/8) presents to [facility] ED on 11/17 c/o chest pressure, MAY and dry cough x 2 days. Pt reports that the chest pressure is midsternal, non-radiating and has been constant since its onset. She states that up until 2 days ago she was able to walk her dogs without becoming SOB, but now becomes SOB while walking around in her home. Pt reported symptoms to Dr. Trinh's office earlier in the day and was directed to the ED for further evaluation. Denies fever, chills, hemoptysis, LE edema, unilateral leg swelling, recent travel, numbness/weakness, dizziness, LOC or known COVID contacts. In the ED, COVID PCR negative. CXR showed L pleural effusion increased from prior XR on 11/7 and LLL pneumonia and/or atelectasis. CT angio chest showed a RUL segmental pulmonary embolism.

## 2020-11-18 NOTE — PROGRESS NOTE ADULT - ASSESSMENT
76 y/o F w/ PMHx of HTN, HLD and CAD s/p C3L w/ Dr. Trinh on 11/4/20 (d/c'd home 11/8) presents to [facility] ED on 11/17 c/o chest pressure, MAY and dry cough x 2 days. Pt reported symptoms to Dr. Trinh's office earlier in the day and was directed to the ED for further evaluation. In the ED, COVID PCR negative. CXR showed L pleural effusion increased from prior XR on 11/7 and LLL pneumonia and/or atelectasis. CT angio chest showed a RUL segmental pulmonary embolism.   Pt seen and evaluated in ED - VSS, NAD. Afebrile, HR 70s, O2 sat 97%, pt breathing comfortably on RA. WBC elevated, 11.42. D/w Dr. Trinh - will initiate heparin gtt given RUL segmental PE as well as Lasix 40 mg PO QD & Kdur 20 meq QD x 1 week given pleural effusion. Will resume home medications.      76 y/o F w/ PMHx of HTN, HLD and CAD s/p C3L w/ Dr. Trinh on 11/4/20 (d/c'd home 11/8) presents to [facility] ED on 11/17 c/o chest pressure, MAY and dry cough x 2 days. Pt reported symptoms to Dr. Trinh's office earlier in the day and was directed to the ED for further evaluation. In the ED, COVID PCR negative. CXR showed L pleural effusion increased from prior XR on 11/7 and LLL pneumonia and/or atelectasis. CT angio chest showed a RUL segmental pulmonary embolism.   Pt seen and evaluated in ED - VSS, NAD. Afebrile, HR 70s, O2 sat 97%, pt breathing comfortably on RA. WBC elevated, 11.42. D/w Dr. Trinh - will initiate heparin gtt given RUL segmental PE as well as Lasix 40 mg PO QD & Kdur 20 meq QD x 1 week given pleural effusion. Will resume home medications.     11/18 - VSS, pt afebrile, wbc trending down 9.3, ptt 53.2 on heparin drip, check B/L duplex to r/o DVT and TTE. Will plan to start NOAC.    76 y/o F w/ PMHx of HTN, HLD and CAD s/p C3L w/ Dr. Trinh on 11/4/20 (d/c'd home 11/8) presents to [facility] ED on 11/17 c/o chest pressure, MAY and dry cough x 2 days. Pt reported symptoms to Dr. Trinh's office earlier in the day and was directed to the ED for further evaluation. In the ED, COVID PCR negative. CXR showed L pleural effusion increased from prior XR on 11/7 and LLL pneumonia and/or atelectasis. CT angio chest showed a RUL segmental pulmonary embolism.   Pt seen and evaluated in ED - VSS, NAD. Afebrile, HR 70s, O2 sat 97%, pt breathing comfortably on RA. WBC elevated, 11.42. D/w Dr. Trinh - will initiate heparin gtt given RUL segmental PE as well as Lasix 40 mg PO QD & Kdur 20 meq QD x 1 week given pleural effusion. Will resume home medications.     11/18 - VSS, pt afebrile, wbc trending down 9.3, ptt 53.2 on heparin drip, check B/L duplex to r/o DVT and TTE. Will plan to start Eliquis 5 BID per vascular cardiology.

## 2020-11-18 NOTE — H&P ADULT - ASSESSMENT
76 y/o F w/ PMHx of HTN, HLD and CAD s/p C3L w/ Dr. Trinh on 11/4/20 (d/c'd home 11/8) presents to [facility] ED on 11/17 c/o chest pressure, MAY and dry cough x 2 days. Pt reported symptoms to Dr. Trinh's office earlier in the day and was directed to the ED for further evaluation. In the ED, COVID PCR negative. CXR showed L pleural effusion increased from prior XR on 11/7 and LLL pneumonia and/or atelectasis. CT angio chest showed a RUL segmental pulmonary embolism.     Pt seen and evaluated in ED - VSS, NAD. Afebrile, HR 70s, O2 sat 97%, pt breathing comfortably on RA. WBC elevated, 11.42. D/w Dr. Trinh - will initiate heparin gtt given RUL segmental PE as well as Lasix 40 mg PO QD & Kdur 20 meq QD x 1 week given pleural effusion. Will resume home medications.

## 2020-11-18 NOTE — CONSULT NOTE ADULT - SUBJECTIVE AND OBJECTIVE BOX
Vascular Cardiology Consult Note    DIRECT SERVICE NUMBER:  [phone]     EMAIL [email or web address]     OFFICE [phone]    CC:      HPI:    75 year old female with HTN, HLD, CAD s/p CABG (3v, 11/4/2020) presents to ED on 11/17 with chest pressure and, found to have RUL PE.    MEDICATIONS  (STANDING):  aspirin enteric coated 325 milliGRAM(s) Oral daily  atorvastatin 40 milliGRAM(s) Oral at bedtime  furosemide    Tablet 40 milliGRAM(s) Oral daily  heparin  Infusion.  Unit(s)/Hr (11 mL/Hr) IV Continuous <Continuous>  influenza   Vaccine 0.5 milliLiter(s) IntraMuscular once  metoprolol succinate  milliGRAM(s) Oral daily  pantoprazole    Tablet 40 milliGRAM(s) Oral before breakfast  potassium chloride    Tablet ER 20 milliEquivalent(s) Oral daily  senna 2 Tablet(s) Oral at bedtime    Allergies  No Known Allergies    PAST MEDICAL & SURGICAL HISTORY:  Former smoker  during teenage years  Colon Cancer  s/p colon rection 3/25/09 no recurrence to date, did not require chemo nor radiation  Thyroid Nodule  benign since 08  Hernia, Umbilical  surgically repaired 2009 during colon resection 2009 amd again repaired in 2010 with mesh  History of Ovarian Cyst  left  Single Renal Cyst  right 10cm (per report)  Functional Murmur  childhood polio and rheumatic fever    H/O: Hypertension  Right Facial Numbness  s/p repair of right facial muscle 30 yrs ago  S/P Colonoscopy with Polypectomy  S/P Endoscopy  S/P Colon Resection  S/P Tubal Ligation  S/P [facility] Biopsy of Cervix  due to abnormal PAP 1994 and 2008      FAMILY HISTORY:  CAD (coronary artery disease) (Father, Mother)    SOCIAL HISTORY:  unchanged    REVIEW OF SYSTEMS:  CONSTITUTIONAL: No fever, weight loss, or fatigue  EYES: No eye pain, visual disturbances, or discharge  ENMT: No difficulty hearing, tinnitus, vertigo; No sinus or throat pain  NECK: No pain or stiffness  RESPIRATORY: No shortness of breath, cough, or hemoptysis  CARDIOVASCULAR: No chest pain or palpitations  GASTROINTESTINAL: No abdominal or epigastric pain. No nausea, vomiting, or hematemesis; No diarrhea or constipation. No melena or hematochezia.  GENITOURINARY: No dysuria, frequency, hematuria, or incontinence  NEUROLOGICAL: No headaches, memory loss, loss of strength, numbness, or tremors  SKIN: No rashes  LYMPH Nodes: No enlarged glands  ENDOCRINE: No heat or cold intolerance; No hair loss  MUSCULOSKELETAL: No joint pain or swelling; No muscle, back, or extremity pain  PSYCHIATRIC: No depression, anxiety, mood swings, or difficulty sleeping  HEME/LYMPH: No easy bruising, or bleeding gums  ALLERGY AND IMMUNOLOGIC: No hives or eczema    [x] All others negative	  [ ] Unable to obtain    PHYSICAL EXAM:  T(C): 36.8 (11-18-20 @ 11:43), Max: 37.1 (11-18-20 @ 04:50)  HR: 71 (11-18-20 @ 11:43) (71 - 76)  BP: 126/75 (11-18-20 @ 11:43) (125/76 - 153/73)  RR: 18 (11-18-20 @ 11:43) (18 - 20)  SpO2: 93% (11-18-20 @ 11:43) (93% - 98%)  Wt(kg): --  I&O's Summary    17 Nov 2020 07:01  -  18 Nov 2020 07:00  --------------------------------------------------------  IN: 164 mL / OUT: 0 mL / NET: 164 mL    18 Nov 2020 07:01  -  18 Nov 2020 13:39  --------------------------------------------------------  IN: 520 mL / OUT: 800 mL / NET: -280 mL    General: Comfortable  HEENT: EOMI	  Neck: JVP not elevated  Chest: Clear to auscultation bilaterally  CV: RRR. Normal S1 and S2. No murmurs, rubs, or gallops.   Abdomen: Soft, non-distended, non-tender  Psych: Alert and oriented x 3. Mood and affect appropriate.   Skin: No rashes, ecchymoses, or skin breakdown  Neuro: No focal deficits.  Extremities: Warm. No LE edema.    LABS:	 	                        12.2   9.38  )-----------( 390      ( 18 Nov 2020 07:43 )             39.3     11-18    141  |  102  |  12  ----------------------------<  121<H>  3.8   |  22  |  0.67    Ca    9.5      18 Nov 2020 07:43    TPro  6.8  /  Alb  4.2  /  TBili  1.2  /  DBili  x   /  AST  23  /  ALT  20  /  AlkPhos  121<H>  11-17      IMAGING:  < from: CT Angio Chest w/ IV Cont (11.17.20 @ 23:30) >  IMPRESSION:  Right upper lobe segmental pulmonary embolus.      Assessment:  1. Right Upper Lobe PE     Plan:  1. Continue Heparin gtt, PTT goal 60-90  2. Follow-up bilateral venous duplex and Echo       Thank you    Vascular Cardiology Service  DIRECT SERVICE NUMBER [phone]  Office [phone]  email:  [email or web address]           Vascular Cardiology Consult Note    DIRECT SERVICE NUMBER:  [phone]     EMAIL [email or web address]     OFFICE [phone]    CC:      HPI:    75 year old female with HTN, HLD, CAD s/p CABG (3v, 11/4/2020) presents to ED on 11/17 with chest pressure and, found to have RUL PE. HDS. No tachycardia or supplemental O2 requirement. Labs with normal renal function.    MEDICATIONS  (STANDING):  aspirin enteric coated 325 milliGRAM(s) Oral daily  atorvastatin 40 milliGRAM(s) Oral at bedtime  furosemide    Tablet 40 milliGRAM(s) Oral daily  heparin  Infusion.  Unit(s)/Hr (11 mL/Hr) IV Continuous <Continuous>  influenza   Vaccine 0.5 milliLiter(s) IntraMuscular once  metoprolol succinate  milliGRAM(s) Oral daily  pantoprazole    Tablet 40 milliGRAM(s) Oral before breakfast  potassium chloride    Tablet ER 20 milliEquivalent(s) Oral daily  senna 2 Tablet(s) Oral at bedtime    Allergies  No Known Allergies    PAST MEDICAL & SURGICAL HISTORY:  Former smoker  during teenage years  Colon Cancer  s/p colon rection 3/25/09 no recurrence to date, did not require chemo nor radiation  Thyroid Nodule  benign since 08  Hernia, Umbilical  surgically repaired 2009 during colon resection 2009 amd again repaired in 2010 with mesh  History of Ovarian Cyst  left  Single Renal Cyst  right 10cm (per report)  Functional Murmur  childhood polio and rheumatic fever    H/O: Hypertension  Right Facial Numbness  s/p repair of right facial muscle 30 yrs ago  S/P Colonoscopy with Polypectomy  S/P Endoscopy  S/P Colon Resection  S/P Tubal Ligation  S/P [facility] Biopsy of Cervix  due to abnormal PAP 1994 and 2008      FAMILY HISTORY:  CAD (coronary artery disease) (Father, Mother)    SOCIAL HISTORY:  unchanged    REVIEW OF SYSTEMS:  CONSTITUTIONAL: No fever, weight loss, or fatigue  EYES: No eye pain, visual disturbances, or discharge  ENMT: No difficulty hearing, tinnitus, vertigo; No sinus or throat pain  NECK: No pain or stiffness  RESPIRATORY: No shortness of breath, cough, or hemoptysis  CARDIOVASCULAR: No chest pain or palpitations  GASTROINTESTINAL: No abdominal or epigastric pain. No nausea, vomiting, or hematemesis; No diarrhea or constipation. No melena or hematochezia.  GENITOURINARY: No dysuria, frequency, hematuria, or incontinence  NEUROLOGICAL: No headaches, memory loss, loss of strength, numbness, or tremors  SKIN: No rashes  LYMPH Nodes: No enlarged glands  ENDOCRINE: No heat or cold intolerance; No hair loss  MUSCULOSKELETAL: No joint pain or swelling; No muscle, back, or extremity pain  PSYCHIATRIC: No depression, anxiety, mood swings, or difficulty sleeping  HEME/LYMPH: No easy bruising, or bleeding gums  ALLERGY AND IMMUNOLOGIC: No hives or eczema    [x] All others negative	  [ ] Unable to obtain    PHYSICAL EXAM:  T(C): 36.8 (11-18-20 @ 11:43), Max: 37.1 (11-18-20 @ 04:50)  HR: 71 (11-18-20 @ 11:43) (71 - 76)  BP: 126/75 (11-18-20 @ 11:43) (125/76 - 153/73)  RR: 18 (11-18-20 @ 11:43) (18 - 20)  SpO2: 93% (11-18-20 @ 11:43) (93% - 98%)  Wt(kg): --  I&O's Summary    17 Nov 2020 07:01  -  18 Nov 2020 07:00  --------------------------------------------------------  IN: 164 mL / OUT: 0 mL / NET: 164 mL    18 Nov 2020 07:01  -  18 Nov 2020 13:39  --------------------------------------------------------  IN: 520 mL / OUT: 800 mL / NET: -280 mL    General: Comfortable  HEENT: EOMI	  Neck: JVP not elevated  Chest: Clear to auscultation bilaterally  CV: RRR. Normal S1 and S2. No murmurs, rubs, or gallops.   Abdomen: Soft, non-distended, non-tender  Psych: Alert and oriented x 3. Mood and affect appropriate.   Skin: No rashes, ecchymoses, or skin breakdown  Neuro: No focal deficits.  Extremities: Warm. No LE edema.    LABS:	 	                        12.2   9.38  )-----------( 390      ( 18 Nov 2020 07:43 )             39.3     11-18    141  |  102  |  12  ----------------------------<  121<H>  3.8   |  22  |  0.67    Ca    9.5      18 Nov 2020 07:43    TPro  6.8  /  Alb  4.2  /  TBili  1.2  /  DBili  x   /  AST  23  /  ALT  20  /  AlkPhos  121<H>  11-17      IMAGING:  < from: CT Angio Chest w/ IV Cont (11.17.20 @ 23:30) >  IMPRESSION:  Right upper lobe segmental pulmonary embolus.      Assessment:  75 year old female with HTN, HLD, CAD s/p CABG (3v, 11/4/2020) presents to ED on 11/17 with chest pressure and, found to have RUL PE.     1. Right Upper Lobe PE  -- HDS, no tachycardia or O2 requiement  2. CAD s/p recent CABG 11/4/2020     Plan:  1. Continue Heparin gtt, PTT goal 60-90  2. Follow-up bilateral venous duplex and Echo   3. Recommend transition to Eliquis 5mg BID when stable from CTS standpoint    Thank you    Vascular Cardiology Service  DIRECT SERVICE NUMBER [phone]  Office [phone]  email:  [email or web address]           Vascular Cardiology Consult Note    DIRECT SERVICE NUMBER:  [phone]     EMAIL [email or web address]     OFFICE [phone]    CC:      HPI:    75 year old female with HTN, HLD, CAD s/p CABG (3v, 11/4/2020) presents to ED on 11/17 with chest pressure and, found to have RUL PE. HDS. No tachycardia or supplemental O2 requirement. N troponin elevation. Labs with normal renal function.    MEDICATIONS  (STANDING):  aspirin enteric coated 325 milliGRAM(s) Oral daily  atorvastatin 40 milliGRAM(s) Oral at bedtime  furosemide    Tablet 40 milliGRAM(s) Oral daily  heparin  Infusion.  Unit(s)/Hr (11 mL/Hr) IV Continuous <Continuous>  influenza   Vaccine 0.5 milliLiter(s) IntraMuscular once  metoprolol succinate  milliGRAM(s) Oral daily  pantoprazole    Tablet 40 milliGRAM(s) Oral before breakfast  potassium chloride    Tablet ER 20 milliEquivalent(s) Oral daily  senna 2 Tablet(s) Oral at bedtime    Allergies  No Known Allergies    PAST MEDICAL & SURGICAL HISTORY:  Former smoker  during teenage years  Colon Cancer  s/p colon rection 3/25/09 no recurrence to date, did not require chemo nor radiation  Thyroid Nodule  benign since 08  Hernia, Umbilical  surgically repaired 2009 during colon resection 2009 amd again repaired in 2010 with mesh  History of Ovarian Cyst  left  Single Renal Cyst  right 10cm (per report)  Functional Murmur  childhood polio and rheumatic fever    H/O: Hypertension  Right Facial Numbness  s/p repair of right facial muscle 30 yrs ago  S/P Colonoscopy with Polypectomy  S/P Endoscopy  S/P Colon Resection  S/P Tubal Ligation  S/P [facility] Biopsy of Cervix  due to abnormal PAP 1994 and 2008    FAMILY HISTORY:  CAD (coronary artery disease) (Father, Mother)    SOCIAL HISTORY:  unchanged    REVIEW OF SYSTEMS:  CONSTITUTIONAL: No fever, weight loss, or fatigue  EYES: No eye pain, visual disturbances, or discharge  ENMT: No difficulty hearing, tinnitus, vertigo; No sinus or throat pain  NECK: No pain or stiffness  RESPIRATORY: No shortness of breath, cough, or hemoptysis  CARDIOVASCULAR: No chest pain or palpitations  GASTROINTESTINAL: No abdominal or epigastric pain. No nausea, vomiting, or hematemesis; No diarrhea or constipation. No melena or hematochezia.  GENITOURINARY: No dysuria, frequency, hematuria, or incontinence  NEUROLOGICAL: No headaches, memory loss, loss of strength, numbness, or tremors  SKIN: No rashes  LYMPH Nodes: No enlarged glands  ENDOCRINE: No heat or cold intolerance; No hair loss  MUSCULOSKELETAL: No joint pain or swelling; No muscle, back, or extremity pain  PSYCHIATRIC: No depression, anxiety, mood swings, or difficulty sleeping  HEME/LYMPH: No easy bruising, or bleeding gums  ALLERGY AND IMMUNOLOGIC: No hives or eczema    [x] All others negative	  [ ] Unable to obtain    PHYSICAL EXAM:  T(C): 36.8 (11-18-20 @ 11:43), Max: 37.1 (11-18-20 @ 04:50)  HR: 71 (11-18-20 @ 11:43) (71 - 76)  BP: 126/75 (11-18-20 @ 11:43) (125/76 - 153/73)  RR: 18 (11-18-20 @ 11:43) (18 - 20)  SpO2: 93% (11-18-20 @ 11:43) (93% - 98%)  Wt(kg): --  I&O's Summary    17 Nov 2020 07:01  -  18 Nov 2020 07:00  --------------------------------------------------------  IN: 164 mL / OUT: 0 mL / NET: 164 mL    18 Nov 2020 07:01  -  18 Nov 2020 13:39  --------------------------------------------------------  IN: 520 mL / OUT: 800 mL / NET: -280 mL    General: Comfortable  HEENT: EOMI	  Neck: JVP not elevated  Chest: Clear to auscultation bilaterally  CV: RRR. Normal S1 and S2. No murmurs, rubs, or gallops.   Abdomen: Soft, non-distended, non-tender  Psych: Alert and oriented x 3. Mood and affect appropriate.   Skin: No rashes, ecchymoses, or skin breakdown  Neuro: No focal deficits.  Extremities: Warm. No LE edema.    LABS:	 	                        12.2   9.38  )-----------( 390      ( 18 Nov 2020 07:43 )             39.3     11-18    141  |  102  |  12  ----------------------------<  121<H>  3.8   |  22  |  0.67    Ca    9.5      18 Nov 2020 07:43    TPro  6.8  /  Alb  4.2  /  TBili  1.2  /  DBili  x   /  AST  23  /  ALT  20  /  AlkPhos  121<H>  11-17      IMAGING:  < from: CT Angio Chest w/ IV Cont (11.17.20 @ 23:30) >  IMPRESSION:  Right upper lobe segmental pulmonary embolus.      Assessment:  75 year old female with HTN, HLD, CAD s/p CABG (3v, 11/4/2020) presents to ED on 11/17 with chest pressure and, found to have RUL PE.     1. Right Upper Lobe PE  -- HDS, no tachycardia or O2 requirement, no troponin elevation  2. CAD s/p recent CABG 11/4/2020     Plan:  1. Continue Heparin gtt, PTT goal 60-90  2. Follow-up bilateral venous duplex and Echo   3. Recommend transition to Eliquis 5mg BID when stable from CTS standpoint    Thank you    Vascular Cardiology Service  DIRECT SERVICE NUMBER [phone]  Office [phone]  email:  [email or web address]           Vascular Cardiology Consult Note    DIRECT SERVICE NUMBER:  [phone]     EMAIL [email or web address]     OFFICE [phone]    CC:  SOB    HPI:    75 year old female with HTN, HLD, CAD s/p CABG (3v, 11/4/2020) presents to ED on 11/17 with chest pressure and, found to have RUL PE. HDS. No tachycardia or supplemental O2 requirement. N troponin elevation. Labs with normal renal function.    MEDICATIONS  (STANDING):  aspirin enteric coated 325 milliGRAM(s) Oral daily  atorvastatin 40 milliGRAM(s) Oral at bedtime  furosemide    Tablet 40 milliGRAM(s) Oral daily  heparin  Infusion.  Unit(s)/Hr (11 mL/Hr) IV Continuous <Continuous>  influenza   Vaccine 0.5 milliLiter(s) IntraMuscular once  metoprolol succinate  milliGRAM(s) Oral daily  pantoprazole    Tablet 40 milliGRAM(s) Oral before breakfast  potassium chloride    Tablet ER 20 milliEquivalent(s) Oral daily  senna 2 Tablet(s) Oral at bedtime    Allergies  No Known Allergies    PAST MEDICAL & SURGICAL HISTORY:  Former smoker  during teenage years  Colon Cancer  s/p colon rection 3/25/09 no recurrence to date, did not require chemo nor radiation  Thyroid Nodule  benign since 08  Hernia, Umbilical  surgically repaired 2009 during colon resection 2009 amd again repaired in 2010 with mesh  History of Ovarian Cyst  left  Single Renal Cyst  right 10cm (per report)  Functional Murmur  childhood polio and rheumatic fever    H/O: Hypertension  Right Facial Numbness  s/p repair of right facial muscle 30 yrs ago  S/P Colonoscopy with Polypectomy  S/P Endoscopy  S/P Colon Resection  S/P Tubal Ligation  S/P [facility] Biopsy of Cervix  due to abnormal PAP 1994 and 2008    FAMILY HISTORY:  CAD (coronary artery disease) (Father, Mother)    SOCIAL HISTORY:  unchanged    REVIEW OF SYSTEMS:  CONSTITUTIONAL: No fever, weight loss, or fatigue  EYES: No eye pain, visual disturbances, or discharge  ENMT: No difficulty hearing, tinnitus, vertigo; No sinus or throat pain  NECK: No pain or stiffness  RESPIRATORY: No shortness of breath, cough, or hemoptysis  CARDIOVASCULAR: No chest pain or palpitations  GASTROINTESTINAL: No abdominal or epigastric pain. No nausea, vomiting, or hematemesis; No diarrhea or constipation. No melena or hematochezia.  GENITOURINARY: No dysuria, frequency, hematuria, or incontinence  NEUROLOGICAL: No headaches, memory loss, loss of strength, numbness, or tremors  SKIN: No rashes  LYMPH Nodes: No enlarged glands  ENDOCRINE: No heat or cold intolerance; No hair loss  MUSCULOSKELETAL: No joint pain or swelling; No muscle, back, or extremity pain  PSYCHIATRIC: No depression, anxiety, mood swings, or difficulty sleeping  HEME/LYMPH: No easy bruising, or bleeding gums  ALLERGY AND IMMUNOLOGIC: No hives or eczema    [x] All others negative	  [ ] Unable to obtain    PHYSICAL EXAM:  T(C): 36.8 (11-18-20 @ 11:43), Max: 37.1 (11-18-20 @ 04:50)  HR: 71 (11-18-20 @ 11:43) (71 - 76)  BP: 126/75 (11-18-20 @ 11:43) (125/76 - 153/73)  RR: 18 (11-18-20 @ 11:43) (18 - 20)  SpO2: 93% (11-18-20 @ 11:43) (93% - 98%)  Wt(kg): --  I&O's Summary    17 Nov 2020 07:01  -  18 Nov 2020 07:00  --------------------------------------------------------  IN: 164 mL / OUT: 0 mL / NET: 164 mL    18 Nov 2020 07:01  -  18 Nov 2020 13:39  --------------------------------------------------------  IN: 520 mL / OUT: 800 mL / NET: -280 mL    General: Comfortable  HEENT: EOMI	  Neck: JVP not elevated  Chest: Clear to auscultation bilaterally  CV: RRR. Normal S1 and S2. No murmurs, rubs, or gallops.   Abdomen: Soft, non-distended, non-tender  Psych: Alert and oriented x 3. Mood and affect appropriate.   Skin: No rashes, ecchymoses, or skin breakdown  Neuro: No focal deficits.  Extremities: Warm. No LE edema.    LABS:	 	                        12.2   9.38  )-----------( 390      ( 18 Nov 2020 07:43 )             39.3     11-18    141  |  102  |  12  ----------------------------<  121<H>  3.8   |  22  |  0.67    Ca    9.5      18 Nov 2020 07:43    TPro  6.8  /  Alb  4.2  /  TBili  1.2  /  DBili  x   /  AST  23  /  ALT  20  /  AlkPhos  121<H>  11-17      IMAGING:  < from: CT Angio Chest w/ IV Cont (11.17.20 @ 23:30) >  IMPRESSION:  Right upper lobe segmental pulmonary embolus.      Assessment:  75 year old female with HTN, HLD, CAD s/p CABG (3v, 11/4/2020) presents to ED on 11/17 with chest pressure and, found to have RUL PE.     1. Right Upper Lobe PE  -- HDS, no tachycardia or O2 requirement, no troponin elevation  2. CAD s/p recent CABG 11/4/2020     Plan:  1. Continue Heparin gtt, PTT goal 60-90  2. Follow-up bilateral venous duplex and Echo   3. Recommend transition to Eliquis 5mg BID when stable from CTS standpoint    Thank you    Vascular Cardiology Service  DIRECT SERVICE NUMBER [phone]  Office [phone]  email:  [email or web address]           Vascular Cardiology Consult Note    DIRECT SERVICE NUMBER:  [phone]     EMAIL [email or web address]     OFFICE [phone]    CC:  SOB    HPI:    75 year old female with HTN, HLD, CAD s/p CABG (3v, 11/4/2020) presents to ED on 11/17 with chest pressure and, found to have RUL PE. HDS. No tachycardia or supplemental O2 requirement. No troponin elevation. Labs with normal renal function.    MEDICATIONS  (STANDING):  aspirin enteric coated 325 milliGRAM(s) Oral daily  atorvastatin 40 milliGRAM(s) Oral at bedtime  furosemide    Tablet 40 milliGRAM(s) Oral daily  heparin  Infusion.  Unit(s)/Hr (11 mL/Hr) IV Continuous <Continuous>  influenza   Vaccine 0.5 milliLiter(s) IntraMuscular once  metoprolol succinate  milliGRAM(s) Oral daily  pantoprazole    Tablet 40 milliGRAM(s) Oral before breakfast  potassium chloride    Tablet ER 20 milliEquivalent(s) Oral daily  senna 2 Tablet(s) Oral at bedtime    Allergies  No Known Allergies    PAST MEDICAL & SURGICAL HISTORY:  Former smoker  during teenage years  Colon Cancer  s/p colon rection 3/25/09 no recurrence to date, did not require chemo nor radiation  Thyroid Nodule  benign since 08  Hernia, Umbilical  surgically repaired 2009 during colon resection 2009 amd again repaired in 2010 with mesh  History of Ovarian Cyst  left  Single Renal Cyst  right 10cm (per report)  Functional Murmur  childhood polio and rheumatic fever    H/O: Hypertension  Right Facial Numbness  s/p repair of right facial muscle 30 yrs ago  S/P Colonoscopy with Polypectomy  S/P Endoscopy  S/P Colon Resection  S/P Tubal Ligation  S/P [facility] Biopsy of Cervix  due to abnormal PAP 1994 and 2008    FAMILY HISTORY:  CAD (coronary artery disease) (Father, Mother)    SOCIAL HISTORY:  unchanged    REVIEW OF SYSTEMS:  CONSTITUTIONAL: No fever, weight loss, or fatigue  EYES: No eye pain, visual disturbances, or discharge  ENMT: No difficulty hearing, tinnitus, vertigo; No sinus or throat pain  NECK: No pain or stiffness  RESPIRATORY: No shortness of breath, cough, or hemoptysis  CARDIOVASCULAR: No chest pain or palpitations  GASTROINTESTINAL: No abdominal or epigastric pain. No nausea, vomiting, or hematemesis; No diarrhea or constipation. No melena or hematochezia.  GENITOURINARY: No dysuria, frequency, hematuria, or incontinence  NEUROLOGICAL: No headaches, memory loss, loss of strength, numbness, or tremors  SKIN: No rashes  LYMPH Nodes: No enlarged glands  ENDOCRINE: No heat or cold intolerance; No hair loss  MUSCULOSKELETAL: No joint pain or swelling; No muscle, back, or extremity pain  PSYCHIATRIC: No depression, anxiety, mood swings, or difficulty sleeping  HEME/LYMPH: No easy bruising, or bleeding gums  ALLERGY AND IMMUNOLOGIC: No hives or eczema    [x] All others negative	  [ ] Unable to obtain    PHYSICAL EXAM:  T(C): 36.8 (11-18-20 @ 11:43), Max: 37.1 (11-18-20 @ 04:50)  HR: 71 (11-18-20 @ 11:43) (71 - 76)  BP: 126/75 (11-18-20 @ 11:43) (125/76 - 153/73)  RR: 18 (11-18-20 @ 11:43) (18 - 20)  SpO2: 93% (11-18-20 @ 11:43) (93% - 98%)  Wt(kg): --  I&O's Summary    17 Nov 2020 07:01  -  18 Nov 2020 07:00  --------------------------------------------------------  IN: 164 mL / OUT: 0 mL / NET: 164 mL    18 Nov 2020 07:01  -  18 Nov 2020 13:39  --------------------------------------------------------  IN: 520 mL / OUT: 800 mL / NET: -280 mL    General: Comfortable  HEENT: EOMI	  Neck: JVP not elevated  Chest: Clear to auscultation bilaterally  CV: RRR. Normal S1 and S2. No murmurs, rubs, or gallops.   Abdomen: Soft, non-distended, non-tender  Psych: Alert and oriented x 3. Mood and affect appropriate.   Skin: No rashes, ecchymoses, or skin breakdown  Neuro: No focal deficits.  Extremities: Warm. No LE edema.    LABS:	 	                        12.2   9.38  )-----------( 390      ( 18 Nov 2020 07:43 )             39.3     11-18    141  |  102  |  12  ----------------------------<  121<H>  3.8   |  22  |  0.67    Ca    9.5      18 Nov 2020 07:43    TPro  6.8  /  Alb  4.2  /  TBili  1.2  /  DBili  x   /  AST  23  /  ALT  20  /  AlkPhos  121<H>  11-17      IMAGING:  < from: CT Angio Chest w/ IV Cont (11.17.20 @ 23:30) >  IMPRESSION:  Right upper lobe segmental pulmonary embolus.      Assessment:  75 year old female with HTN, HLD, CAD s/p CABG (3v, 11/4/2020) presents to ED on 11/17 with chest pressure and, found to have RUL PE.     1. Right Upper Lobe PE  -- HDS, no tachycardia or O2 requirement, no troponin elevation  2. CAD s/p recent CABG 11/4/2020     Plan:  1. Continue Heparin gtt, PTT goal 60-90  2. Follow-up bilateral venous duplex and Echo   3. Recommend transition to Eliquis 5mg BID when stable from CTS standpoint    Thank you    Vascular Cardiology Service  DIRECT SERVICE NUMBER [phone]  Office [phone]  email:  [email or web address]           Vascular Cardiology Consult Note    DIRECT SERVICE NUMBER:  [phone]     EMAIL [email or web address]     OFFICE [phone]    CC:  SOB    HPI:    75 year old female with HTN, HLD, CAD s/p CABG (3v, 11/4/2020) presents to ED on 11/17 with chest pressure and, found to have RUL PE. HDS. No tachycardia or supplemental O2 requirement. No troponin elevation. Labs with normal renal function.  No prior history of VTE.      MEDICATIONS  (STANDING):  aspirin enteric coated 325 milliGRAM(s) Oral daily  atorvastatin 40 milliGRAM(s) Oral at bedtime  furosemide    Tablet 40 milliGRAM(s) Oral daily  heparin  Infusion.  Unit(s)/Hr (11 mL/Hr) IV Continuous <Continuous>  influenza   Vaccine 0.5 milliLiter(s) IntraMuscular once  metoprolol succinate  milliGRAM(s) Oral daily  pantoprazole    Tablet 40 milliGRAM(s) Oral before breakfast  potassium chloride    Tablet ER 20 milliEquivalent(s) Oral daily  senna 2 Tablet(s) Oral at bedtime    Allergies  No Known Allergies    PAST MEDICAL & SURGICAL HISTORY:  Former smoker  during teenage years  Colon Cancer  s/p colon rection 3/25/09 no recurrence to date, did not require chemo nor radiation  Thyroid Nodule  benign since 08  Hernia, Umbilical  surgically repaired 2009 during colon resection 2009 amd again repaired in 2010 with mesh  History of Ovarian Cyst  left  Single Renal Cyst  right 10cm (per report)  Functional Murmur  childhood polio and rheumatic fever    H/O: Hypertension  Right Facial Numbness  s/p repair of right facial muscle 30 yrs ago  S/P Colonoscopy with Polypectomy  S/P Endoscopy  S/P Colon Resection  S/P Tubal Ligation  S/P [facility] Biopsy of Cervix  due to abnormal PAP 1994 and 2008    FAMILY HISTORY:  CAD (coronary artery disease) (Father, Mother)    SOCIAL HISTORY:  unchanged    REVIEW OF SYSTEMS:  CONSTITUTIONAL: No fever, weight loss, or fatigue  EYES: No eye pain, visual disturbances, or discharge  ENMT: No difficulty hearing, tinnitus, vertigo; No sinus or throat pain  NECK: No pain or stiffness  RESPIRATORY: No shortness of breath, cough, or hemoptysis  CARDIOVASCULAR: No chest pain or palpitations  GASTROINTESTINAL: No abdominal or epigastric pain. No nausea, vomiting, or hematemesis; No diarrhea or constipation. No melena or hematochezia.  GENITOURINARY: No dysuria, frequency, hematuria, or incontinence  NEUROLOGICAL: No headaches, memory loss, loss of strength, numbness, or tremors  SKIN: No rashes  LYMPH Nodes: No enlarged glands  ENDOCRINE: No heat or cold intolerance; No hair loss  MUSCULOSKELETAL: No joint pain or swelling; No muscle, back, or extremity pain  PSYCHIATRIC: No depression, anxiety, mood swings, or difficulty sleeping  HEME/LYMPH: No easy bruising, or bleeding gums  ALLERGY AND IMMUNOLOGIC: No hives or eczema    [x] All others negative	  [ ] Unable to obtain    PHYSICAL EXAM:  T(C): 36.8 (11-18-20 @ 11:43), Max: 37.1 (11-18-20 @ 04:50)  HR: 71 (11-18-20 @ 11:43) (71 - 76)  BP: 126/75 (11-18-20 @ 11:43) (125/76 - 153/73)  RR: 18 (11-18-20 @ 11:43) (18 - 20)  SpO2: 93% (11-18-20 @ 11:43) (93% - 98%)  Wt(kg): --  I&O's Summary    17 Nov 2020 07:01  -  18 Nov 2020 07:00  --------------------------------------------------------  IN: 164 mL / OUT: 0 mL / NET: 164 mL    18 Nov 2020 07:01  -  18 Nov 2020 13:39  --------------------------------------------------------  IN: 520 mL / OUT: 800 mL / NET: -280 mL    General: Comfortable  HEENT: EOMI	  Neck: JVP not elevated  Chest: Clear to auscultation bilaterally  CV: RRR. Normal S1 and S2. No murmurs, rubs, or gallops.   Abdomen: Soft, non-distended, non-tender  Psych: Alert and oriented x 3. Mood and affect appropriate.   Skin: No rashes, ecchymoses, or skin breakdown  Neuro: No focal deficits.  Extremities: Warm. No LE edema.    LABS:	 	                        12.2   9.38  )-----------( 390      ( 18 Nov 2020 07:43 )             39.3     11-18    141  |  102  |  12  ----------------------------<  121<H>  3.8   |  22  |  0.67    Ca    9.5      18 Nov 2020 07:43    TPro  6.8  /  Alb  4.2  /  TBili  1.2  /  DBili  x   /  AST  23  /  ALT  20  /  AlkPhos  121<H>  11-17      IMAGING:  < from: CT Angio Chest w/ IV Cont (11.17.20 @ 23:30) >  IMPRESSION:  Right upper lobe segmental pulmonary embolus.      Assessment:  75 year old female with HTN, HLD, CAD s/p CABG (3v, 11/4/2020) presents to ED on 11/17 with chest pressure and, found to have RUL PE.     1. Right Upper Lobe PE  -- HDS, no tachycardia or O2 requirement, no troponin elevation  2. CAD s/p recent CABG 11/4/2020     Plan:  Patient has a small PE and below the knee DVT  Seems provoked in nature  Would treat for at least 3 months with anticoagulation  At 7pm, start eliquis 5mg BID and stop heparin gtt  Reduce dose of aspirin from 325 --> 81mg daily   d/w Dr. Trinh  Likely dc tomorrow    Jan [phone]    Thank you    Vascular Cardiology Service  DIRECT SERVICE NUMBER [phone]  Office [phone]  email:  [email or web address]

## 2020-11-18 NOTE — PROGRESS NOTE ADULT - PROBLEM SELECTOR PLAN 2
CXR 11/17 showed L pleural effusion, read as small effusion on CT angio chest   Start Lasix 40 mg PO QD & Kdur 20 meq QD x 1 week per Dr. Trinh CXR 11/17 showed L pleural effusion, read as small effusion on CT angio chest   Start Lasix 40 mg PO QD & Kdur 20 meq QD x 1 week per Dr. Trinh  Daily BMP  Strict I&O's, Daily weight

## 2020-11-18 NOTE — PROGRESS NOTE ADULT - PROBLEM SELECTOR PLAN 1
CT angio chest 11/17 showed RUL segmental PE  AC w/ heparin gtt   Continuous pulse ox - currently 97% on RA   Daily PTT CT angio chest 11/17 showed RUL segmental PE  AC w/ heparin gtt   Daily PTT goal 60 - 90  Pending TTE and B/L LE Duplex r/o DVT Vascular cardiology consulted  CT angio chest 11/17 showed RUL segmental PE  AC w/ heparin gtt   Daily PTT goal 60 - 90  Pending TTE and B/L LE Duplex r/o DVT

## 2020-11-18 NOTE — H&P ADULT - NSHPSOCIALHISTORY_GEN_ALL_CORE
Retinal tear and detachment warning symptoms reviewed and patient instructed to call immediately if increasing floaters, flashes, or decreasing peripheral vision. 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

## 2020-11-18 NOTE — H&P ADULT - NSHPLABSRESULTS_GEN_ALL_CORE
11.8   11.42 )-----------( 402      ( 17 Nov 2020 17:40 )             37.5   11-17    142  |  105  |  17  ----------------------------<  105<H>  4.7   |  23  |  0.82    Ca    9.6      17 Nov 2020 17:40    TPro  6.8  /  Alb  4.2  /  TBili  1.2  /  DBili  x   /  AST  23  /  ALT  20  /  AlkPhos  121<H>  11-17    Activated Partial Thromboplastin Time: 24.7 sec (11.17.20 @ 17:40)  Prothrombin Time, Plasma: 13.3 sec (11.17.20 @ 17:40), INR: 1.11    Troponin T, High Sensitivity Result: 26 -->Troponin T, High Sensitivity Result: 25    COVID-19 PCR (11.17.20 @ 18:11): NotDetec    Xray Chest 1 View- PORTABLE-Urgent (11.17.20 @ 18:50): No pneumothorax. Left pleural effusion, increased since prior study 11/7/2020. Left lower lobe pneumonia and/or atelectasis.    CT Angio Chest w/ IV Cont (11.17.20 @ 23:30): Right upper lobe segmental pulmonary embolus. Small L pleural effusion. Trace pericardial effusion. Nonspecific interlobar septal thickening, which can be seen in pulmonary edema given pleural effusion and cardiomegaly. Recommend clinical correlation. Heterogeneous thyroid gland, which can be further characterized on a nonemergent ultrasound.

## 2020-11-18 NOTE — H&P ADULT - PROBLEM SELECTOR PLAN 2
CXR 11/17 showed L pleural effusion, read as small effusion on CT angio chest   Start Lasix 40 mg PO QD & Kdur 20 meq QD x 1 week per Dr. Trinh

## 2020-11-18 NOTE — PROGRESS NOTE ADULT - SUBJECTIVE AND OBJECTIVE BOX
Subjective: Pt states "Hello" denies any CP or SOB. No acute events overnight.     Telemetry:  Ectopic atrial 60 - 70  Vital Signs Last 24 Hrs  T(C): 37.1 (11-18-20 @ 04:50), Max: 37.1 (11-18-20 @ 04:50)  T(F): 98.8 (11-18-20 @ 04:50), Max: 98.8 (11-18-20 @ 04:50)  HR: 72 (11-18-20 @ 04:50) (71 - 76)  BP: 125/76 (11-18-20 @ 04:50) (125/76 - 153/73)  RR: 18 (11-18-20 @ 04:50) (18 - 20)  SpO2: 94% (11-18-20 @ 04:50) (93% - 98%)             11-17 @ 07:01  -  11-18 @ 07:00  --------------------------------------------------------  IN: 164 mL / OUT: 0 mL / NET: 164 mL                            12.2   9.38  )-----------( 390      ( 18 Nov 2020 07:43 )             39.3     141  |  102  |  12  ----------------------------<  121<H>  3.8   |  22  |  0.67            PHYSICAL EXAM  Neurology: A&Ox3, NAD  CV : RRR+S1S2  Sternal Wound: MSI CDI LUIS ANTONIO healed, Stable  Lungs: Respirations non-labored, B/L BS clear, diminished left base  Abdomen: Soft, NT/ND, +BSx4Q, last BM 11/16 (-)N/V/D  : Voiding without difficulty  Extremities: B/L LE +1 edema, negative calf tenderness, +PP,                    +LLE SVG incision CDI LUIS ANTONIO           MEDICATIONS  aspirin enteric coated 325 milliGRAM(s) Oral daily  atorvastatin 40 milliGRAM(s) Oral at bedtime  furosemide    Tablet 40 milliGRAM(s) Oral daily  heparin  Infusion.  Unit(s)/Hr IV Continuous <Continuous>  influenza   Vaccine 0.5 milliLiter(s) IntraMuscular once  metoprolol succinate  milliGRAM(s) Oral daily  pantoprazole    Tablet 40 milliGRAM(s) Oral before breakfast  potassium chloride    Tablet ER 20 milliEquivalent(s) Oral once  potassium chloride    Tablet ER 20 milliEquivalent(s) Oral daily  senna 2 Tablet(s) Oral at bedtime      Physical Therapy Rec:   Home  [  ]   Home w/ PT  [  ]  Rehab  [  ]    Discussed with Cardiothoracic Team at AM rounds. Subjective: Pt states "Hello" denies any CP or SOB. No acute events overnight.     Telemetry:  Ectopic atrial 60 - 70  Vital Signs Last 24 Hrs  T(C): 37.1 (11-18-20 @ 04:50), Max: 37.1 (11-18-20 @ 04:50)  T(F): 98.8 (11-18-20 @ 04:50), Max: 98.8 (11-18-20 @ 04:50)  HR: 72 (11-18-20 @ 04:50) (71 - 76)  BP: 125/76 (11-18-20 @ 04:50) (125/76 - 153/73)  RR: 18 (11-18-20 @ 04:50) (18 - 20)  SpO2: 94% (11-18-20 @ 04:50) (93% - 98%)             11-17 @ 07:01  -  11-18 @ 07:00  --------------------------------------------------------  IN: 164 mL / OUT: 0 mL / NET: 164 mL                            12.2   9.38  )-----------( 390      ( 18 Nov 2020 07:43 )             39.3     141  |  102  |  12  ----------------------------<  121<H>  3.8   |  22  |  0.67            PHYSICAL EXAM  Neurology: A&Ox3, NAD  CV : RRR+S1S2  Sternal Wound: MSI CDI LUIS ANTONIO healed, Stable  Lungs: Respirations non-labored, B/L BS clear, diminished left base  Abdomen: Soft, NT/ND, +BSx4Q, last BM 11/16 (-)N/V/D  : Voiding without difficulty  Extremities: B/L LE +1 edema, negative calf tenderness, +PP,                    +LLE SVG incision CDI LUIS ANTONIO           MEDICATIONS  aspirin enteric coated 325 milliGRAM(s) Oral daily  atorvastatin 40 milliGRAM(s) Oral at bedtime  furosemide    Tablet 40 milliGRAM(s) Oral daily  heparin  Infusion.  Unit(s)/Hr IV Continuous <Continuous>  influenza   Vaccine 0.5 milliLiter(s) IntraMuscular once  metoprolol succinate  milliGRAM(s) Oral daily  pantoprazole    Tablet 40 milliGRAM(s) Oral before breakfast  potassium chloride    Tablet ER 20 milliEquivalent(s) Oral once  potassium chloride    Tablet ER 20 milliEquivalent(s) Oral daily  senna 2 Tablet(s) Oral at bedtime        Discussed with Cardiothoracic Team at AM rounds.

## 2020-11-18 NOTE — H&P ADULT - NSHPPHYSICALEXAM_GEN_ALL_CORE
General: NAD  HEENT:  NC/AT  Neuro: A&Ox4, speech clear, no focal deficits noted  Respiratory: (+) crackles at L base, otherwise CTA b/l   Cardiovascular: RRR, (+) S1/S2, no murmur appreciated  GI: Abd soft, NT/ND, (+) BSx4Q (+) BM  Peripheral Vascular:  no LE edema b/l, 2+ peripheral pulses b/l  Musculoskeletal: B/L UE and LE 5/5 strength   Psychiatric: Normal mood, normal affect observed  Skin: MSI LUIS ANTONIO - CDI, stable. L SVG site w/ dressing in place - CDI. Normal exam to inspection and palpation.

## 2020-11-18 NOTE — ED ADULT NURSE REASSESSMENT NOTE - NS ED NURSE REASSESS COMMENT FT1
Second IV placed for additional access. Heparin bolus and infusion initiated as per orders based off Heparin nomogram. Second RN Zeynep present to confirm correct medication administration. Patient currently safe and comfortable. Will continue to monitor.

## 2020-11-19 ENCOUNTER — TRANSCRIPTION ENCOUNTER (OUTPATIENT)
Age: 75
End: 2020-11-19

## 2020-11-19 VITALS — WEIGHT: 139.33 LBS

## 2020-11-19 DIAGNOSIS — I26.99 OTHER PULMONARY EMBOLISM WITHOUT ACUTE COR PULMONALE: ICD-10-CM

## 2020-11-19 LAB
ANION GAP SERPL CALC-SCNC: 15 MMOL/L — SIGNIFICANT CHANGE UP (ref 5–17)
BUN SERPL-MCNC: 22 MG/DL — SIGNIFICANT CHANGE UP (ref 7–23)
CALCIUM SERPL-MCNC: 10.2 MG/DL — SIGNIFICANT CHANGE UP (ref 8.4–10.5)
CHLORIDE SERPL-SCNC: 104 MMOL/L — SIGNIFICANT CHANGE UP (ref 96–108)
CO2 SERPL-SCNC: 21 MMOL/L — LOW (ref 22–31)
CREAT SERPL-MCNC: 0.87 MG/DL — SIGNIFICANT CHANGE UP (ref 0.5–1.3)
GLUCOSE SERPL-MCNC: 142 MG/DL — HIGH (ref 70–99)
HCT VFR BLD CALC: 43.4 % — SIGNIFICANT CHANGE UP (ref 34.5–45)
HGB BLD-MCNC: 13.5 G/DL — SIGNIFICANT CHANGE UP (ref 11.5–15.5)
MCHC RBC-ENTMCNC: 23.2 PG — LOW (ref 27–34)
MCHC RBC-ENTMCNC: 31.1 GM/DL — LOW (ref 32–36)
MCV RBC AUTO: 74.7 FL — LOW (ref 80–100)
NRBC # BLD: 0 /100 WBCS — SIGNIFICANT CHANGE UP (ref 0–0)
PLATELET # BLD AUTO: 390 K/UL — SIGNIFICANT CHANGE UP (ref 150–400)
POTASSIUM SERPL-MCNC: 4 MMOL/L — SIGNIFICANT CHANGE UP (ref 3.5–5.3)
POTASSIUM SERPL-SCNC: 4 MMOL/L — SIGNIFICANT CHANGE UP (ref 3.5–5.3)
RBC # BLD: 5.81 M/UL — HIGH (ref 3.8–5.2)
RBC # FLD: 20.1 % — HIGH (ref 10.3–14.5)
SODIUM SERPL-SCNC: 140 MMOL/L — SIGNIFICANT CHANGE UP (ref 135–145)
WBC # BLD: 7.67 K/UL — SIGNIFICANT CHANGE UP (ref 3.8–10.5)
WBC # FLD AUTO: 7.67 K/UL — SIGNIFICANT CHANGE UP (ref 3.8–10.5)

## 2020-11-19 PROCEDURE — 71275 CT ANGIOGRAPHY CHEST: CPT

## 2020-11-19 PROCEDURE — 85730 THROMBOPLASTIN TIME PARTIAL: CPT

## 2020-11-19 PROCEDURE — 93970 EXTREMITY STUDY: CPT

## 2020-11-19 PROCEDURE — 93970 EXTREMITY STUDY: CPT | Mod: 26

## 2020-11-19 PROCEDURE — 99285 EMERGENCY DEPT VISIT HI MDM: CPT

## 2020-11-19 PROCEDURE — 85027 COMPLETE CBC AUTOMATED: CPT

## 2020-11-19 PROCEDURE — 82553 CREATINE MB FRACTION: CPT

## 2020-11-19 PROCEDURE — 99232 SBSQ HOSP IP/OBS MODERATE 35: CPT

## 2020-11-19 PROCEDURE — 84484 ASSAY OF TROPONIN QUANT: CPT

## 2020-11-19 PROCEDURE — 80053 COMPREHEN METABOLIC PANEL: CPT

## 2020-11-19 PROCEDURE — 93306 TTE W/DOPPLER COMPLETE: CPT

## 2020-11-19 PROCEDURE — 93308 TTE F-UP OR LMTD: CPT

## 2020-11-19 PROCEDURE — 85025 COMPLETE CBC W/AUTO DIFF WBC: CPT

## 2020-11-19 PROCEDURE — 85610 PROTHROMBIN TIME: CPT

## 2020-11-19 PROCEDURE — 80048 BASIC METABOLIC PNL TOTAL CA: CPT

## 2020-11-19 PROCEDURE — 99024 POSTOP FOLLOW-UP VISIT: CPT

## 2020-11-19 PROCEDURE — U0003: CPT

## 2020-11-19 PROCEDURE — 71045 X-RAY EXAM CHEST 1 VIEW: CPT

## 2020-11-19 RX ORDER — APIXABAN 2.5 MG/1
1 TABLET, FILM COATED ORAL
Qty: 60 | Refills: 0
Start: 2020-11-19 | End: 2020-12-18

## 2020-11-19 RX ORDER — ASPIRIN/CALCIUM CARB/MAGNESIUM 324 MG
1 TABLET ORAL
Qty: 30 | Refills: 0
Start: 2020-11-19 | End: 2020-12-18

## 2020-11-19 RX ADMIN — Medication 100 MILLIGRAM(S): at 06:15

## 2020-11-19 RX ADMIN — Medication 20 MILLIEQUIVALENT(S): at 11:27

## 2020-11-19 RX ADMIN — Medication 40 MILLIGRAM(S): at 06:14

## 2020-11-19 RX ADMIN — APIXABAN 5 MILLIGRAM(S): 2.5 TABLET, FILM COATED ORAL at 06:15

## 2020-11-19 RX ADMIN — Medication 81 MILLIGRAM(S): at 11:27

## 2020-11-19 RX ADMIN — PANTOPRAZOLE SODIUM 40 MILLIGRAM(S): 20 TABLET, DELAYED RELEASE ORAL at 06:15

## 2020-11-19 NOTE — DISCHARGE NOTE PROVIDER - NSDCPNSUBOBJ_GEN_ALL_CORE
Neurology: A&Ox3, nonfocal, no gross deficits  CV : RRR+S1S2  Sternal Wound: MSI stable, open   Lungs: Respirations non-labored, B/L BS  Abdomen: Soft, NT/ND, +BSx4Q, + BM   : Voiding without difficulty  Extremities: B/L LE edema, negative calf tenderness, +PP     More than 30 mins spent on total encounter, patient counseling and discharge instructions.

## 2020-11-19 NOTE — DISCHARGE NOTE PROVIDER - NSDCCPCAREPLAN_GEN_ALL_CORE_FT
PRINCIPAL DISCHARGE DIAGNOSIS  Diagnosis: Pulmonary embolism  Assessment and Plan of Treatment:

## 2020-11-19 NOTE — DISCHARGE NOTE PROVIDER - NSDCFUADDINST_GEN_ALL_CORE_FT
Follow Do's and Don'ts of cardiac surgery instruction packet for care and activity at home.   Continue to take your prescribed medications as directed.   Follow up with Dr. Trinh on MONDAY 11/30 at 9AM at CTS office at [facility], call [phone] to confirm appointment.  Follow up w/ your primary care doctor and your cardiologist in 2 weeks, call to schedule an appointment.   Follow up w/ Dr Montoya in 1 month, call [phone] to schedule your appointment.

## 2020-11-19 NOTE — DISCHARGE NOTE PROVIDER - HOSPITAL COURSE
76 y/o F w/ PMHx of HTN, HLD and CAD s/p C3L w/ Dr. Trinh on 11/4/20 (d/c'd home 11/8) presents to [facility] ED on 11/17 c/o chest pressure, MAY and dry cough x 2 days. Pt reported symptoms to Dr. Trinh's office earlier in the day and was directed to the ED for further evaluation. In the ED, COVID PCR negative. CXR showed L pleural effusion increased from prior XR on 11/7 and LLL pneumonia and/or atelectasis. CT angio chest showed a RUL segmental pulmonary embolism.   Pt seen and evaluated in ED - VSS, NAD. Afebrile, HR 70s, O2 sat 97%, pt breathing comfortably on RA. WBC elevated, 11.42. D/w Dr. Trinh - will initiate heparin gtt given RUL segmental PE as well as Lasix 40 mg PO QD & Kdur 20 meq QD x 1 week given pleural effusion. Will resume home medications.     11/18 - VSS, pt afebrile, wbc trending down 9.3, ptt 53.2 on heparin drip, check B/L duplex to r/o DVT and TTE. Will plan to start Eliquis 5 BID per vascular cardiology.   11/19 HD stable, stable for d/c on eliqius BID. F/u Dr Trinh 11/30 at 9AM.

## 2020-11-19 NOTE — DISCHARGE NOTE PROVIDER - CARE PROVIDER_API CALL
Navdeep Trinh  SURGERY  [address] [place] [address]  Phone: [phone]  Fax: [phone]  Follow Up Time:     Wilfredo Montoya  CARDIOVASCULAR DISEASE  [address] [place] [address]  Phone: [phone]  Fax: [phone]  Follow Up Time:

## 2020-11-19 NOTE — DISCHARGE NOTE PROVIDER - NSDCFUADDAPPT_GEN_ALL_CORE_FT
Follow Do's and Don'ts of cardiac surgery instruction packet for care and activity at home.   Continue to take your prescribed medications as directed.   Follow up with Dr. Trinh on MONDAY 11/30 at 9AM at CTS office at [facility], call [phone] to confirm appointment.  Follow up w/ your primary care doctor and your cardiologist in 2 weeks, call to schedule an appointment.   Follow up w/ Dr Montoya in 1 month, call [phone] to schedule your appointment.

## 2020-11-19 NOTE — PROGRESS NOTE ADULT - SUBJECTIVE AND OBJECTIVE BOX
Vascular Cardiology Progress Note    DIRECT SERVICE NUMBER:  [phone]     EMAIL [email or web address]     OFFICE [phone]    INTERVAL HISTORY:  --    MEDICATIONS  (STANDING):  apixaban 5 milliGRAM(s) Oral every 12 hours  aspirin enteric coated 81 milliGRAM(s) Oral daily  atorvastatin 40 milliGRAM(s) Oral at bedtime  furosemide    Tablet 40 milliGRAM(s) Oral daily  influenza   Vaccine 0.5 milliLiter(s) IntraMuscular once  metoprolol succinate  milliGRAM(s) Oral daily  pantoprazole    Tablet 40 milliGRAM(s) Oral before breakfast  potassium chloride    Tablet ER 20 milliEquivalent(s) Oral daily  senna 2 Tablet(s) Oral at bedtime    Allergies  No Known Allergies    PAST MEDICAL & SURGICAL HISTORY:  Former smoker  during teenage years  Colon Cancer  s/p colon rection 3/25/09 no recurrence to date, did not require chemo nor radiation  Thyroid Nodule  benign since 08  Hernia, Umbilical  surgically repaired 2009 during colon resection 2009 amd again repaired in 2010 with mesh  History of Ovarian Cyst  left  Single Renal Cyst  right 10cm (per report)    Functional Murmur  childhood polio and rheumatic fever  H/O: Hypertension  Right Facial Numbness  s/p repair of right facial muscle 30 yrs ago  S/P Colonoscopy with Polypectomy  S/P Endoscopy  S/P Colon Resection  S/P Tubal Ligation  S/P [facility] Biopsy of Cervix  due to abnormal PAP 1994 and 2008    FAMILY HISTORY:  CAD (coronary artery disease) (Father, Mother)    SOCIAL HISTORY: unchanged    REVIEW OF SYSTEMS:  CONSTITUTIONAL: No fever, weight loss, or fatigue  EYES: No eye pain, visual disturbances, or discharge  ENMT: No difficulty hearing, tinnitus, vertigo; No sinus or throat pain  NECK: No pain or stiffness  RESPIRATORY: No shortness of breath, cough, or hemoptysis  CARDIOVASCULAR: No chest pain or palpitations  GASTROINTESTINAL: No abdominal or epigastric pain. No nausea, vomiting, or hematemesis; No diarrhea or constipation. No melena or hematochezia.  GENITOURINARY: No dysuria, frequency, hematuria, or incontinence  NEUROLOGICAL: No headaches, memory loss, loss of strength, numbness, or tremors  SKIN: No rashes  LYMPH Nodes: No enlarged glands  ENDOCRINE: No heat or cold intolerance; No hair loss  MUSCULOSKELETAL: No joint pain or swelling; No muscle, back, or extremity pain  PSYCHIATRIC: No depression, anxiety, mood swings, or difficulty sleeping  HEME/LYMPH: No easy bruising, or bleeding gums  ALLERGY AND IMMUNOLOGIC: No hives or eczema	    [x] All others negative	  [ ] Unable to obtain    PHYSICAL EXAM:  T(C): 36.7 (11-19-20 @ 05:41), Max: 36.8 (11-18-20 @ 11:43)  HR: 78 (11-19-20 @ 05:41) (71 - 78)  BP: 131/82 (11-19-20 @ 05:41) (115/70 - 131/82)  RR: 16 (11-19-20 @ 05:41) (16 - 18)  SpO2: 96% (11-19-20 @ 05:41) (93% - 96%)  Wt(kg): --    I&O's Summary  18 Nov 2020 07:01  -  19 Nov 2020 07:00  --------------------------------------------------------  IN: 840 mL / OUT: 1200 mL / NET: -360 mL    General: Comfortable  HEENT: EOMI	  Neck: JVP not elevated  Chest: Clear to auscultation bilaterally  CV: RRR. Normal S1 and S2. No murmurs, rubs, or gallops.   Abdomen: Soft, non-distended, non-tender  Psych: Alert and oriented x 3. Mood and affect appropriate.   Skin: No rashes, ecchymoses, or skin breakdown  Neuro: No focal deficits.  Extremities: Warm. No LE edema    LABS:	 	                        13.5   7.67  )-----------( 390      ( 19 Nov 2020 06:44 )             43.4     11-19    140  |  104  |  22  ----------------------------<  142<H>  4.0   |  21<L>  |  0.87    Ca    10.2      19 Nov 2020 06:43    TPro  6.8  /  Alb  4.2  /  TBili  1.2  /  DBili  x   /  AST  23  /  ALT  20  /  AlkPhos  121<H>  11-17      IMAGING:  < from: CT Angio Chest w/ IV Cont (11.17.20 @ 23:30) >  IMPRESSION:  Right upper lobe segmental pulmonary embolus.    < from: Transthoracic Echocardiogram (11.18.20 @ 12:41) >  Conclusions:  1. Mitral annular calcification and calcified mitral  leaflets with decreased diastolic opening.  2. Calcified trileaflet aortic valve with normal opening.  No aortic valve regurgitation seen.  3. Endocardium not well visualized; grossly normal left  ventricular systolic function.  4. Moderate diastolic dysfunction (Stage II).  5. Theright ventricle is not well visualized; grossly  normal right ventricular systolic function.  *** Compared with echocardiogram of 11/4/2020 report only,  no significant changes noted.    < from: [facility] Duplex Lower Ext Vein Scan, Bilat (11.19.20 @ 03:33) >  IMPRESSION:  Bilateral soleal vein thrombosis.      Assessment:  75 year old female with HTN, HLD, CAD s/p CABG (3v, 11/4/2020) presents to ED on 11/17 with chest pressure and, found to have RUL PE.     1. Right Upper Lobe PE  -- HDS, no tachycardia or O2 requirement, no troponin elevation  2. CAD s/p recent CABG 11/4/2020     Plan:  Patient has a small PE and below the knee DVT  Seems provoked in nature  Would treat for at least 3 months with anticoagulation  At 7pm, start eliquis 5mg BID and stop heparin gtt  Reduce dose of aspirin from 325 --> 81mg daily   d/w Dr. Trinh  Likely dc tomorrow    Thank you    Vascular Cardiology Service  DIRECT SERVICE NUMBER [phone]  Office [phone]  email:  [email or web address]         Vascular Cardiology Progress Note    DIRECT SERVICE NUMBER:  [phone]     EMAIL [email or web address]     OFFICE [phone]    INTERVAL HISTORY:  -- No acute events overnight.  -- HDS. Ambulating without SOB.    MEDICATIONS  (STANDING):  apixaban 5 milliGRAM(s) Oral every 12 hours  aspirin enteric coated 81 milliGRAM(s) Oral daily  atorvastatin 40 milliGRAM(s) Oral at bedtime  furosemide    Tablet 40 milliGRAM(s) Oral daily  influenza   Vaccine 0.5 milliLiter(s) IntraMuscular once  metoprolol succinate  milliGRAM(s) Oral daily  pantoprazole    Tablet 40 milliGRAM(s) Oral before breakfast  potassium chloride    Tablet ER 20 milliEquivalent(s) Oral daily  senna 2 Tablet(s) Oral at bedtime    Allergies  No Known Allergies    PAST MEDICAL & SURGICAL HISTORY:  Former smoker  during teenage years  Colon Cancer  s/p colon rection 3/25/09 no recurrence to date, did not require chemo nor radiation  Thyroid Nodule  benign since 08  Hernia, Umbilical  surgically repaired 2009 during colon resection 2009 amd again repaired in 2010 with mesh  History of Ovarian Cyst  left  Single Renal Cyst  right 10cm (per report)    Functional Murmur  childhood polio and rheumatic fever  H/O: Hypertension  Right Facial Numbness  s/p repair of right facial muscle 30 yrs ago  S/P Colonoscopy with Polypectomy  S/P Endoscopy  S/P Colon Resection  S/P Tubal Ligation  S/P [facility] Biopsy of Cervix  due to abnormal PAP 1994 and 2008    FAMILY HISTORY:  CAD (coronary artery disease) (Father, Mother)    SOCIAL HISTORY: unchanged    REVIEW OF SYSTEMS:  CONSTITUTIONAL: No fever, weight loss, or fatigue  EYES: No eye pain, visual disturbances, or discharge  ENMT: No difficulty hearing, tinnitus, vertigo; No sinus or throat pain  NECK: No pain or stiffness  RESPIRATORY: No shortness of breath, cough, or hemoptysis  CARDIOVASCULAR: No chest pain or palpitations  GASTROINTESTINAL: No abdominal or epigastric pain. No nausea, vomiting, or hematemesis; No diarrhea or constipation. No melena or hematochezia.  GENITOURINARY: No dysuria, frequency, hematuria, or incontinence  NEUROLOGICAL: No headaches, memory loss, loss of strength, numbness, or tremors  SKIN: No rashes  LYMPH Nodes: No enlarged glands  ENDOCRINE: No heat or cold intolerance; No hair loss  MUSCULOSKELETAL: No joint pain or swelling; No muscle, back, or extremity pain  PSYCHIATRIC: No depression, anxiety, mood swings, or difficulty sleeping  HEME/LYMPH: No easy bruising, or bleeding gums  ALLERGY AND IMMUNOLOGIC: No hives or eczema	    [x] All others negative	  [ ] Unable to obtain    PHYSICAL EXAM:  T(C): 36.7 (11-19-20 @ 05:41), Max: 36.8 (11-18-20 @ 11:43)  HR: 78 (11-19-20 @ 05:41) (71 - 78)  BP: 131/82 (11-19-20 @ 05:41) (115/70 - 131/82)  RR: 16 (11-19-20 @ 05:41) (16 - 18)  SpO2: 96% (11-19-20 @ 05:41) (93% - 96%)  Wt(kg): --    I&O's Summary  18 Nov 2020 07:01  -  19 Nov 2020 07:00  --------------------------------------------------------  IN: 840 mL / OUT: 1200 mL / NET: -360 mL    General: Comfortable  HEENT: EOMI	  Neck: JVP not elevated  Chest: Clear to auscultation bilaterally  CV: RRR. Normal S1 and S2. No murmurs, rubs, or gallops.   Abdomen: Soft, non-distended, non-tender  Psych: Alert and oriented x 3. Mood and affect appropriate.   Skin: Sternal chest wound, healing well.  Neuro: No focal deficits.  Extremities: Warm. No LE edema    LABS:	 	                        13.5   7.67  )-----------( 390      ( 19 Nov 2020 06:44 )             43.4     11-19    140  |  104  |  22  ----------------------------<  142<H>  4.0   |  21<L>  |  0.87    Ca    10.2      19 Nov 2020 06:43    TPro  6.8  /  Alb  4.2  /  TBili  1.2  /  DBili  x   /  AST  23  /  ALT  20  /  AlkPhos  121<H>  11-17      IMAGING:  < from: CT Angio Chest w/ IV Cont (11.17.20 @ 23:30) >  IMPRESSION:  Right upper lobe segmental pulmonary embolus.    < from: Transthoracic Echocardiogram (11.18.20 @ 12:41) >  Conclusions:  1. Mitral annular calcification and calcified mitral  leaflets with decreased diastolic opening.  2. Calcified trileaflet aortic valve with normal opening.  No aortic valve regurgitation seen.  3. Endocardium not well visualized; grossly normal left  ventricular systolic function.  4. Moderate diastolic dysfunction (Stage II).  5. Theright ventricle is not well visualized; grossly  normal right ventricular systolic function.  *** Compared with echocardiogram of 11/4/2020 report only,  no significant changes noted.    < from: [facility] Duplex Lower Ext Vein Scan, Bilat (11.19.20 @ 03:33) >  IMPRESSION:  Bilateral soleal vein thrombosis.      Assessment:  75 year old female with HTN, HLD, CAD s/p CABG (3v, 11/4/2020) p/w chest pressure, found to have PE.    1. Provoked Small Right Upper Lobe PE and Below Knee DVT  2. CAD s/p recent CABG 11/4/2020     Plan:  1. ASA 81mg daily   2. Eliquis 5mg BID  3. Follow-up with Dr. Montoya as outpatient in 2 weeks     Thank you    Vascular Cardiology Service  DIRECT SERVICE NUMBER [phone]  Office [phone]  email:  [email or web address]

## 2020-11-19 NOTE — DISCHARGE NOTE NURSING/CASE MANAGEMENT/SOCIAL WORK - PATIENT PORTAL LINK FT
You can access the FollowMyHealth Patient Portal offered by [facility] by registering at the following website: [email or web address]. By joining [employer]’s FollowMyHealth portal, you will also be able to view your health information using other applications (apps) compatible with our system.

## 2020-11-19 NOTE — DISCHARGE NOTE PROVIDER - NSDCMRMEDTOKEN_GEN_ALL_CORE_FT
apixaban 5 mg oral tablet: 1 tab(s) orally 2 times a day   aspirin 81 mg oral delayed release tablet: 1 tab(s) orally once a day  Lipitor 40 mg oral tablet: 1 tab(s) orally once a day (at bedtime)  pantoprazole 40 mg oral delayed release tablet: 1 tab(s) orally once a day (before a meal)  senna oral tablet: 2 tab(s) orally once a day, As Needed   Toprol- mg oral tablet, extended release: 1 tab(s) orally once a day

## 2020-11-19 NOTE — DISCHARGE NOTE PROVIDER - CARE PROVIDERS DIRECT ADDRESSES
[email or web address]

## 2020-11-23 ENCOUNTER — APPOINTMENT (OUTPATIENT)
Dept: CARDIOTHORACIC SURGERY | Facility: CLINIC | Age: 75
End: 2020-11-23

## 2020-11-25 ENCOUNTER — NON-APPOINTMENT (OUTPATIENT)
Age: 75
End: 2020-11-25

## 2020-11-27 RX ORDER — ASPIRIN 325 MG/1
325 TABLET ORAL DAILY
Refills: 0 | Status: COMPLETED | COMMUNITY
Start: 2020-11-10 | End: 2020-11-27

## 2020-11-30 ENCOUNTER — APPOINTMENT (OUTPATIENT)
Dept: CARDIOTHORACIC SURGERY | Facility: CLINIC | Age: 75
End: 2020-11-30
Payer: COMMERCIAL

## 2020-11-30 VITALS — SYSTOLIC BLOOD PRESSURE: 141 MMHG | DIASTOLIC BLOOD PRESSURE: 57 MMHG

## 2020-11-30 VITALS
HEIGHT: 61 IN | RESPIRATION RATE: 16 BRPM | HEART RATE: 75 BPM | TEMPERATURE: 97.8 F | OXYGEN SATURATION: 97 % | DIASTOLIC BLOOD PRESSURE: 80 MMHG | WEIGHT: 143 LBS | BODY MASS INDEX: 27 KG/M2 | SYSTOLIC BLOOD PRESSURE: 150 MMHG

## 2020-11-30 PROCEDURE — 99024 POSTOP FOLLOW-UP VISIT: CPT

## 2020-12-02 ENCOUNTER — TRANSCRIPTION ENCOUNTER (OUTPATIENT)
Age: 75
End: 2020-12-02

## 2020-12-03 ENCOUNTER — TRANSCRIPTION ENCOUNTER (OUTPATIENT)
Age: 75
End: 2020-12-03

## 2020-12-09 ENCOUNTER — TRANSCRIPTION ENCOUNTER (OUTPATIENT)
Age: 75
End: 2020-12-09

## 2020-12-16 ENCOUNTER — APPOINTMENT (OUTPATIENT)
Dept: CARDIOLOGY | Facility: CLINIC | Age: 75
End: 2020-12-16
Payer: COMMERCIAL

## 2020-12-16 VITALS
BODY MASS INDEX: 26.43 KG/M2 | TEMPERATURE: 98 F | DIASTOLIC BLOOD PRESSURE: 69 MMHG | SYSTOLIC BLOOD PRESSURE: 126 MMHG | OXYGEN SATURATION: 98 % | WEIGHT: 140 LBS | HEART RATE: 71 BPM | HEIGHT: 61 IN

## 2020-12-16 DIAGNOSIS — I10 ESSENTIAL (PRIMARY) HYPERTENSION: ICD-10-CM

## 2020-12-16 DIAGNOSIS — Z09 ENCOUNTER FOR FOLLOW-UP EXAMINATION AFTER COMPLETED TREATMENT FOR CONDITIONS OTHER THAN MALIGNANT NEOPLASM: ICD-10-CM

## 2020-12-16 PROCEDURE — 99072 ADDL SUPL MATRL&STAF TM PHE: CPT

## 2020-12-16 PROCEDURE — 99214 OFFICE O/P EST MOD 30 MIN: CPT

## 2020-12-16 NOTE — PHYSICAL EXAM
[General Appearance - Well Developed] : well developed [Normal Appearance] : normal appearance [Well Groomed] : well groomed [General Appearance - Well Nourished] : well nourished [No Deformities] : no deformities [General Appearance - In No Acute Distress] : no acute distress [Normal Conjunctiva] : the conjunctiva exhibited no abnormalities [Eyelids - No Xanthelasma] : the eyelids demonstrated no xanthelasmas [Normal Oral Mucosa] : normal oral mucosa [No Oral Pallor] : no oral pallor [No Oral Cyanosis] : no oral cyanosis [Normal Jugular Venous A Waves Present] : normal jugular venous A waves present [Normal Jugular Venous V Waves Present] : normal jugular venous V waves present [No Jugular Venous Burt A Waves] : no jugular venous burt A waves [Respiration, Rhythm And Depth] : normal respiratory rhythm and effort [Exaggerated Use Of Accessory Muscles For Inspiration] : no accessory muscle use [Auscultation Breath Sounds / Voice Sounds] : lungs were clear to auscultation bilaterally [Heart Rate And Rhythm] : heart rate and rhythm were normal [Heart Sounds] : normal S1 and S2 [Murmurs] : no murmurs present [Abdomen Soft] : soft [Abdomen Tenderness] : non-tender [Abdomen Mass (___ Cm)] : no abdominal mass palpated [Nail Clubbing] : no clubbing of the fingernails [Cyanosis, Localized] : no localized cyanosis [Petechial Hemorrhages (___cm)] : no petechial hemorrhages [Skin Color & Pigmentation] : normal skin color and pigmentation [] : no rash [No Venous Stasis] : no venous stasis [Skin Lesions] : no skin lesions [No Skin Ulcers] : no skin ulcer [No Xanthoma] : no  xanthoma was observed [Oriented To Time, Place, And Person] : oriented to person, place, and time [Affect] : the affect was normal [Mood] : the mood was normal [No Anxiety] : not feeling anxious

## 2020-12-16 NOTE — REASON FOR VISIT
[FreeTextEntry1] : 75 F CABG with Dr. Trinh\par Found to have a small PE post op CABG and bilateral soleal vein DVT\par Started on eliquis\par here for followup\par Breathing ok\par No CP\par No blood in the urine or stool\par \par \par She had colon cancer 10 years ago.  Had colonoscopy spring 2020 - Dr. Finkelstein - no cancer.

## 2020-12-16 NOTE — ASSESSMENT
[FreeTextEntry1] : Assessment:\par 1.  Provoked VTE\par Bilateral Soleal vein DVT\par Small PE\par 2.  Recent CABG\par 3.  HTN\par \par Plan\par 1.  Labwork today to assure she is tolerating A/C\par 2.  Continue eliquis 5mg BID for at least 3 months\par 3.  Repeat lower extremity venous duplex in 3 months. \par 4.  General cardiac care with her cardiologist\par 5.  Return post duplex to discuss cessation of a/c

## 2020-12-18 LAB
25(OH)D3 SERPL-MCNC: 28.8 NG/ML
ALBUMIN SERPL ELPH-MCNC: 4.4 G/DL
ALP BLD-CCNC: 93 U/L
ALT SERPL-CCNC: 9 U/L
ANION GAP SERPL CALC-SCNC: 11 MMOL/L
AST SERPL-CCNC: 13 U/L
BASOPHILS # BLD AUTO: 0.07 K/UL
BASOPHILS NFR BLD AUTO: 1 %
BILIRUB SERPL-MCNC: 0.9 MG/DL
BUN SERPL-MCNC: 17 MG/DL
CALCIUM SERPL-MCNC: 9.4 MG/DL
CHLORIDE SERPL-SCNC: 106 MMOL/L
CHOLEST SERPL-MCNC: 127 MG/DL
CO2 SERPL-SCNC: 25 MMOL/L
CREAT SERPL-MCNC: 0.86 MG/DL
EOSINOPHIL # BLD AUTO: 0.18 K/UL
EOSINOPHIL NFR BLD AUTO: 2.6 %
ESTIMATED AVERAGE GLUCOSE: 114 MG/DL
GLUCOSE SERPL-MCNC: 104 MG/DL
HBA1C MFR BLD HPLC: 5.6 %
HCT VFR BLD CALC: 40.6 %
HDLC SERPL-MCNC: 51 MG/DL
HGB BLD-MCNC: 12.2 G/DL
IMM GRANULOCYTES NFR BLD AUTO: 0.4 %
LDLC SERPL CALC-MCNC: 55 MG/DL
LYMPHOCYTES # BLD AUTO: 1.38 K/UL
LYMPHOCYTES NFR BLD AUTO: 20 %
MAN DIFF?: NORMAL
MCHC RBC-ENTMCNC: 22.5 PG
MCHC RBC-ENTMCNC: 30 GM/DL
MCV RBC AUTO: 74.8 FL
MONOCYTES # BLD AUTO: 0.62 K/UL
MONOCYTES NFR BLD AUTO: 9 %
NEUTROPHILS # BLD AUTO: 4.63 K/UL
NEUTROPHILS NFR BLD AUTO: 67 %
NONHDLC SERPL-MCNC: 76 MG/DL
PLATELET # BLD AUTO: 277 K/UL
POTASSIUM SERPL-SCNC: 4.5 MMOL/L
PROT SERPL-MCNC: 6.4 G/DL
RBC # BLD: 5.43 M/UL
RBC # FLD: 17 %
SODIUM SERPL-SCNC: 142 MMOL/L
TRIGL SERPL-MCNC: 105 MG/DL
WBC # FLD AUTO: 6.91 K/UL

## 2021-02-08 LAB — DEPRECATED D DIMER PPP IA-ACNC: 191 NG/ML DDU

## 2021-02-17 ENCOUNTER — APPOINTMENT (OUTPATIENT)
Dept: CARDIOLOGY | Facility: CLINIC | Age: 76
End: 2021-02-17

## 2021-03-12 ENCOUNTER — APPOINTMENT (OUTPATIENT)
Dept: CARDIOLOGY | Facility: CLINIC | Age: 76
End: 2021-03-12
Payer: MEDICARE

## 2021-03-12 ENCOUNTER — OUTPATIENT (OUTPATIENT)
Dept: OUTPATIENT SERVICES | Facility: HOSPITAL | Age: 76
LOS: 1 days | End: 2021-03-12
Payer: COMMERCIAL

## 2021-03-12 ENCOUNTER — APPOINTMENT (OUTPATIENT)
Dept: ULTRASOUND IMAGING | Facility: HOSPITAL | Age: 76
End: 2021-03-12
Payer: MEDICARE

## 2021-03-12 VITALS — HEART RATE: 69 BPM | OXYGEN SATURATION: 97 % | SYSTOLIC BLOOD PRESSURE: 165 MMHG | DIASTOLIC BLOOD PRESSURE: 82 MMHG

## 2021-03-12 DIAGNOSIS — Z95.1 PRESENCE OF AORTOCORONARY BYPASS GRAFT: ICD-10-CM

## 2021-03-12 DIAGNOSIS — I25.10 ATHEROSCLEROTIC HEART DISEASE OF NATIVE CORONARY ARTERY W/OUT ANGINA PECTORIS: ICD-10-CM

## 2021-03-12 PROCEDURE — 99214 OFFICE O/P EST MOD 30 MIN: CPT

## 2021-03-12 PROCEDURE — 99072 ADDL SUPL MATRL&STAF TM PHE: CPT

## 2021-03-12 PROCEDURE — 93970 EXTREMITY STUDY: CPT | Mod: 26

## 2021-03-12 PROCEDURE — 93970 EXTREMITY STUDY: CPT

## 2021-03-12 RX ORDER — APIXABAN 5 MG/1
5 TABLET, FILM COATED ORAL
Qty: 60 | Refills: 0 | Status: DISCONTINUED | COMMUNITY
End: 2021-03-12

## 2021-03-12 NOTE — REASON FOR VISIT
[Follow-Up - Clinic] : a clinic follow-up of [FreeTextEntry1] : followup visit 3/12/2021\par \par 75 F CABG with Dr. Trinh\par \par \par Found to have a small PE post op CABG and bilateral soleal vein DVT\par Remains on ELiquis 5mg BID. \par here for followup\par Duplex today shows complete resolution of DVT.  \par

## 2021-03-12 NOTE — ASSESSMENT
[FreeTextEntry1] : Assessment:\par 1.  Provoked VTE\par Bilateral Soleal vein DVT\par Small PE\par 2.  Recent CABG\par 3.  HTN\par \par Plan\par 1.  She has been treated for over 3 months for a provoked DVT\par 2.  Will stop eliquis for completed treatment of a provoked event\par 3.  She is on aspirin 81mg daily \par 4.  Discussed red flags of recurrent DVT\par 5.  Continue care with her other providers\par 6.  Take eliquis 2.5mg 1 hour prior to flights or long drives.  \par 7.  Return in 6 months.

## 2021-03-26 ENCOUNTER — APPOINTMENT (OUTPATIENT)
Dept: ULTRASOUND IMAGING | Facility: HOSPITAL | Age: 76
End: 2021-03-26

## 2021-09-03 NOTE — ED ADULT TRIAGE NOTE - WEIGHT IN KG
-Continue Triamcinolone 0.1% ointment once daily M-F (Body and buttocks)  -Switch to Betamethasone 0.05 5 ointment once daily M-F to affected areas on right leg and left arm  -Continue Cosentyx  -Emollients: Vaseline 64.4

## 2022-04-21 ENCOUNTER — NON-APPOINTMENT (OUTPATIENT)
Age: 77
End: 2022-04-21

## 2022-04-21 ENCOUNTER — APPOINTMENT (OUTPATIENT)
Dept: CARDIOLOGY | Facility: CLINIC | Age: 77
End: 2022-04-21
Payer: MEDICARE

## 2022-04-21 VITALS
OXYGEN SATURATION: 98 % | HEIGHT: 61 IN | HEART RATE: 71 BPM | SYSTOLIC BLOOD PRESSURE: 139 MMHG | WEIGHT: 139 LBS | BODY MASS INDEX: 26.24 KG/M2 | DIASTOLIC BLOOD PRESSURE: 75 MMHG

## 2022-04-21 DIAGNOSIS — R06.00 DYSPNEA, UNSPECIFIED: ICD-10-CM

## 2022-04-21 DIAGNOSIS — I25.10 ATHEROSCLEROTIC HEART DISEASE OF NATIVE CORONARY ARTERY W/OUT ANGINA PECTORIS: ICD-10-CM

## 2022-04-21 DIAGNOSIS — R94.31 ABNORMAL ELECTROCARDIOGRAM [ECG] [EKG]: ICD-10-CM

## 2022-04-21 PROCEDURE — 93000 ELECTROCARDIOGRAM COMPLETE: CPT

## 2022-04-21 PROCEDURE — 99214 OFFICE O/P EST MOD 30 MIN: CPT

## 2022-04-21 RX ORDER — LOSARTAN POTASSIUM 50 MG/1
50 TABLET, FILM COATED ORAL
Qty: 30 | Refills: 3 | Status: DISCONTINUED | COMMUNITY
End: 2022-04-21

## 2022-04-21 RX ORDER — AMLODIPINE BESYLATE 5 MG/1
5 TABLET ORAL DAILY
Qty: 90 | Refills: 3 | Status: ACTIVE | COMMUNITY

## 2022-04-21 RX ORDER — SENNOSIDES 8.6 MG TABLETS 8.6 MG/1
8.6 TABLET ORAL
Refills: 0 | Status: DISCONTINUED | COMMUNITY
Start: 2020-11-10 | End: 2022-04-21

## 2022-04-21 RX ORDER — METOPROLOL SUCCINATE 50 MG/1
50 TABLET, EXTENDED RELEASE ORAL
Refills: 0 | Status: ACTIVE | COMMUNITY
Start: 2020-11-10

## 2022-04-21 NOTE — PHYSICAL EXAM

## 2022-04-21 NOTE — HISTORY OF PRESENT ILLNESS
[FreeTextEntry1] : Missy is a 77-year-old female with hypertension, hyperlipidemia, and CAD.  She is status post a CABG x3 (LIMA to LAD, SVG to D1, and LAD) on 11/4/2020.\par Her postoperative course was complicated by delirium, and she was eventually readmitted with shortness of breath.  A CTA demonstrated a right upper lobe segmental pulmonary embolism, and she was eventually discharged on anticoagulation.  She was also found to have bilateral soleal vein DVTs, that showed resolution on more recent imaging.\par \par Generally, she has been doing well.  She continues to report mild dyspnea on exertion, which has not gotten significantly better since the bypass.  She has no chest pain.  She does report intermittent palpitations, during which she feels her heart beat in her ear, and which occur at night.  She has no lower extremity swelling, orthopnea, PND, dizziness, lightheadedness, or near syncope.\par \par She has been diagnosed with hyperthyroidism, and is currently on methimazole. She does not sleep, though does not feel tired.\par She last saw a cardiologist a few weeks ago, and had an echocardiogram, which she reports to be normal.

## 2022-06-21 ENCOUNTER — APPOINTMENT (OUTPATIENT)
Dept: CARDIOLOGY | Facility: CLINIC | Age: 77
End: 2022-06-21
Payer: MEDICARE

## 2022-06-21 PROCEDURE — 93306 TTE W/DOPPLER COMPLETE: CPT

## 2022-09-28 ENCOUNTER — TRANSCRIPTION ENCOUNTER (OUTPATIENT)
Age: 77
End: 2022-09-28

## 2022-09-29 ENCOUNTER — EMERGENCY (EMERGENCY)
Facility: HOSPITAL | Age: 77
LOS: 1 days | Discharge: HOME | End: 2022-09-29
Attending: EMERGENCY MEDICINE | Admitting: EMERGENCY MEDICINE
Payer: COMMERCIAL

## 2022-09-29 VITALS
HEIGHT: 61 IN | OXYGEN SATURATION: 98 % | SYSTOLIC BLOOD PRESSURE: 149 MMHG | DIASTOLIC BLOOD PRESSURE: 75 MMHG | RESPIRATION RATE: 18 BRPM | HEART RATE: 59 BPM | TEMPERATURE: 97 F

## 2022-09-29 VITALS
TEMPERATURE: 98 F | OXYGEN SATURATION: 95 % | DIASTOLIC BLOOD PRESSURE: 74 MMHG | HEART RATE: 61 BPM | RESPIRATION RATE: 14 BRPM | SYSTOLIC BLOOD PRESSURE: 152 MMHG

## 2022-09-29 PROCEDURE — 90715 TDAP VACCINE 7 YRS/> IM: CPT

## 2022-09-29 PROCEDURE — 99283 EMERGENCY DEPT VISIT LOW MDM: CPT

## 2022-09-29 PROCEDURE — 90471 IMMUNIZATION ADMIN: CPT

## 2022-09-29 PROCEDURE — 13151 CMPLX RPR E/N/E/L 1.1-2.5 CM: CPT

## 2022-09-29 PROCEDURE — 99285 EMERGENCY DEPT VISIT HI MDM: CPT | Mod: 25

## 2022-09-29 RX ORDER — LIDOCAINE HYDROCHLORIDE AND EPINEPHRINE 10; 10 MG/ML; UG/ML
10 INJECTION, SOLUTION INFILTRATION; PERINEURAL ONCE
Refills: 0 | Status: DISCONTINUED | OUTPATIENT
Start: 2022-09-29 | End: 2022-10-02

## 2022-09-29 RX ORDER — TETANUS TOXOID, REDUCED DIPHTHERIA TOXOID AND ACELLULAR PERTUSSIS VACCINE, ADSORBED 5; 2.5; 8; 8; 2.5 [IU]/.5ML; [IU]/.5ML; UG/.5ML; UG/.5ML; UG/.5ML
0.5 SUSPENSION INTRAMUSCULAR ONCE
Refills: 0 | Status: COMPLETED | OUTPATIENT
Start: 2022-09-29 | End: 2022-09-29

## 2022-09-29 RX ADMIN — TETANUS TOXOID, REDUCED DIPHTHERIA TOXOID AND ACELLULAR PERTUSSIS VACCINE, ADSORBED 0.5 MILLILITER(S): 5; 2.5; 8; 8; 2.5 SUSPENSION INTRAMUSCULAR at 07:08

## 2022-09-29 NOTE — ED PROVIDER NOTE - NSFOLLOWUPINSTRUCTIONS_ED_ALL_ED_FT
1) Follow-up with your Primary Medical Doctor or referred doctor. Call today / next business day for prompt follow-up.  2) Return to Emergency room for any worsening or persistent pain, weakness, fever, or any other concerning symptoms.  3) See attached instruction sheets for additional information, including information regarding signs and symptoms to look out for, reasons to seek immediate care and other important instructions.  4) Follow-up with any specialists as discussed / noted as well. 1) Follow-up with Dr Corado. Call today / next business day for prompt follow-up.  2) Return to Emergency room for any worsening or persistent pain, weakness, fever, or any other concerning symptoms.  3) See attached instruction sheets for additional information, including information regarding signs and symptoms to look out for, reasons to seek immediate care and other important instructions.  4) Follow-up with any specialists as discussed / noted as well.      WHAT YOU NEED TO KNOW:    Stitches, or sutures, are used to close cuts and wounds on the skin. Stitches need to be removed after your wound has healed.           DISCHARGE INSTRUCTIONS:    Return to the emergency department if:   •Your stitches come apart.      •Blood soaks through your bandages.      •You suddenly cannot move your injured joint.      •You have sudden numbness around your wound.      •You see red streaks coming from your wound.      Contact your healthcare provider if:   •You have a fever and chills.      •Your wound is red, warm, swollen, or leaking pus.      •There is a bad smell coming from your wound.      •You have increased pain in the wound area.      •You have questions or concerns about your condition or care.      Care for your stitches:   •Protect the stitches. You may need to cover your stitches with a bandage for 24 to 48 hours, or as directed. Do not bump or hit the suture area. This could open the wound. Do not trim or shorten the ends of your stitches. If they rub on your clothing, put a gauze bandage between the stitches and your clothes.      •Clean the area as directed. Carefully wash your wound with soap and water. For mouth and lip wounds, rinse your mouth after meals and at bedtime. Ask your healthcare provider what to use to rinse your mouth. If you have a scalp wound, you may gently wash your hair every 2 days with mild shampoo. Do not use hair products, such as hair spray. Check your wound for signs of infection when you clean it. Signs include redness, swelling, and pus.      •Keep the area dry as directed. Wait 12 to 24 hours after you receive your stitches before you take a shower. Take showers instead of baths. Do not take a bath or swim. Your healthcare provider will give you instructions for bathing with your stitches.      Help your wound heal:   •Elevate your wound. Keep your wound above the level of your heart as often as you can. This will help decrease swelling and pain. Prop the area on pillows or blankets, if possible, to keep it elevated comfortably.      •Limit activity. Do not stretch the skin around your wound. This will help prevent bleeding and swelling.      Follow up with your healthcare provider as directed: You may need to return to have your stitches removed. Write down your questions so you remember to ask them during your visits.

## 2022-09-29 NOTE — ED PROVIDER NOTE - OBJECTIVE STATEMENT
77-year-old female history of hypertension murmur status post mechanical slip and fall on her usual own urine in the bathroom abrasion left side of the lip caught a handlebar.  No LOC no neck pain here with a left upper lip laceration for further evaluation.  No anticoagulant use. 77-year-old female history of hypertension murmur status post mechanical slip and fall on her usual own urine in the bathroom abrasion left side of the lip caught a handlebar.  No LOC no neck pain here with a left upper lip laceration for further evaluation.  No anticoagulant use. tdap not up to date

## 2022-09-29 NOTE — ED PROVIDER NOTE - PHYSICAL EXAMINATION
Gen: Alert, NAD  Head/eyes: NC/AT, PERRL  ENT: airway patent, no loose teeth  Neck: supple  Pulm/lung: Bilateral clear BS  CV/heart: RRR  GI/Abd: soft, NT/ND, +BS, no guarding/rebound tenderness  Musculoskeletal: no edema/erythema/cyanosis  Skin: left upper lip mucosal deformity, full thickness laceration 1cm, no vermillion border involvement  Neuro: AAOx3, grossly intact

## 2022-09-29 NOTE — ED ADULT TRIAGE NOTE - CHIEF COMPLAINT QUOTE
pt reports she slipped at home while going to the bathroom, hit face on furniture, laceration to upper lip. no loose teeth, no blood thinners

## 2022-09-29 NOTE — ED PROVIDER NOTE - PATIENT PORTAL LINK FT
You can access the FollowMyHealth Patient Portal offered by [facility] by registering at the following website: [email or web address]. By joining [employer]’s FollowMyHealth portal, you will also be able to view your health information using other applications (apps) compatible with our system.

## 2022-09-29 NOTE — ED PROVIDER NOTE - CLINICAL SUMMARY MEDICAL DECISION MAKING FREE TEXT BOX
complicated lip laceration, plastics consult for repair complicated lip laceration, plastics consult for repair, tdap

## 2022-09-29 NOTE — ED PROVIDER NOTE - CARE PROVIDER_API CALL
Lobito Corado (DO)  Plastic Surgery  [address] [place] [address]  Phone: [phone]  Fax: [phone]  Follow Up Time:

## 2022-09-29 NOTE — ED ADULT NURSE NOTE - OBJECTIVE STATEMENT
Pt to ED c/c slipped and fell, sustained lac  to left upper lip. No active bleeding, denies LOC, pending eval

## 2023-06-07 ENCOUNTER — EMERGENCY (EMERGENCY)
Facility: HOSPITAL | Age: 78
LOS: 1 days | Discharge: HOME | End: 2023-06-07
Attending: EMERGENCY MEDICINE | Admitting: EMERGENCY MEDICINE
Payer: COMMERCIAL

## 2023-06-07 VITALS
TEMPERATURE: 98 F | RESPIRATION RATE: 20 BRPM | HEART RATE: 78 BPM | SYSTOLIC BLOOD PRESSURE: 134 MMHG | OXYGEN SATURATION: 98 % | DIASTOLIC BLOOD PRESSURE: 74 MMHG

## 2023-06-07 VITALS
TEMPERATURE: 99 F | DIASTOLIC BLOOD PRESSURE: 79 MMHG | WEIGHT: 138.01 LBS | HEART RATE: 81 BPM | RESPIRATION RATE: 18 BRPM | SYSTOLIC BLOOD PRESSURE: 149 MMHG | OXYGEN SATURATION: 99 %

## 2023-06-07 LAB
ALBUMIN SERPL ELPH-MCNC: 3.4 G/DL — SIGNIFICANT CHANGE UP (ref 3.3–5)
ALP SERPL-CCNC: 72 U/L — SIGNIFICANT CHANGE UP (ref 40–120)
ALT FLD-CCNC: 19 U/L — SIGNIFICANT CHANGE UP (ref 12–78)
ANION GAP SERPL CALC-SCNC: 5 MMOL/L — SIGNIFICANT CHANGE UP (ref 5–17)
APTT BLD: 31.8 SEC — SIGNIFICANT CHANGE UP (ref 27.5–35.5)
AST SERPL-CCNC: 19 U/L — SIGNIFICANT CHANGE UP (ref 15–37)
BASOPHILS # BLD AUTO: 0.04 K/UL — SIGNIFICANT CHANGE UP (ref 0–0.2)
BASOPHILS NFR BLD AUTO: 0.4 % — SIGNIFICANT CHANGE UP (ref 0–2)
BILIRUB SERPL-MCNC: 1.6 MG/DL — HIGH (ref 0.2–1.2)
BUN SERPL-MCNC: 19 MG/DL — SIGNIFICANT CHANGE UP (ref 7–23)
CALCIUM SERPL-MCNC: 9 MG/DL — SIGNIFICANT CHANGE UP (ref 8.5–10.1)
CHLORIDE SERPL-SCNC: 109 MMOL/L — HIGH (ref 96–108)
CO2 SERPL-SCNC: 28 MMOL/L — SIGNIFICANT CHANGE UP (ref 22–31)
CREAT SERPL-MCNC: 0.76 MG/DL — SIGNIFICANT CHANGE UP (ref 0.5–1.3)
D DIMER BLD IA.RAPID-MCNC: 314 NG/ML DDU — HIGH
EGFR: 80 ML/MIN/1.73M2 — SIGNIFICANT CHANGE UP
EOSINOPHIL # BLD AUTO: 0.2 K/UL — SIGNIFICANT CHANGE UP (ref 0–0.5)
EOSINOPHIL NFR BLD AUTO: 2.1 % — SIGNIFICANT CHANGE UP (ref 0–6)
GLUCOSE SERPL-MCNC: 106 MG/DL — HIGH (ref 70–99)
HCT VFR BLD CALC: 37.8 % — SIGNIFICANT CHANGE UP (ref 34.5–45)
HGB BLD-MCNC: 11.6 G/DL — SIGNIFICANT CHANGE UP (ref 11.5–15.5)
IMM GRANULOCYTES NFR BLD AUTO: 0.3 % — SIGNIFICANT CHANGE UP (ref 0–0.9)
INR BLD: 1.14 RATIO — SIGNIFICANT CHANGE UP (ref 0.88–1.16)
LYMPHOCYTES # BLD AUTO: 1.4 K/UL — SIGNIFICANT CHANGE UP (ref 1–3.3)
LYMPHOCYTES # BLD AUTO: 15 % — SIGNIFICANT CHANGE UP (ref 13–44)
MCHC RBC-ENTMCNC: 21.2 PG — LOW (ref 27–34)
MCHC RBC-ENTMCNC: 30.7 GM/DL — LOW (ref 32–36)
MCV RBC AUTO: 69.2 FL — LOW (ref 80–100)
MONOCYTES # BLD AUTO: 0.8 K/UL — SIGNIFICANT CHANGE UP (ref 0–0.9)
MONOCYTES NFR BLD AUTO: 8.6 % — SIGNIFICANT CHANGE UP (ref 2–14)
NEUTROPHILS # BLD AUTO: 6.85 K/UL — SIGNIFICANT CHANGE UP (ref 1.8–7.4)
NEUTROPHILS NFR BLD AUTO: 73.6 % — SIGNIFICANT CHANGE UP (ref 43–77)
NRBC # BLD: 0 /100 WBCS — SIGNIFICANT CHANGE UP (ref 0–0)
NT-PROBNP SERPL-SCNC: 1082 PG/ML — HIGH (ref 0–450)
PLATELET # BLD AUTO: 180 K/UL — SIGNIFICANT CHANGE UP (ref 150–400)
POTASSIUM SERPL-MCNC: 3.4 MMOL/L — LOW (ref 3.5–5.3)
POTASSIUM SERPL-SCNC: 3.4 MMOL/L — LOW (ref 3.5–5.3)
PROT SERPL-MCNC: 6.8 G/DL — SIGNIFICANT CHANGE UP (ref 6–8.3)
PROTHROM AB SERPL-ACNC: 13.4 SEC — SIGNIFICANT CHANGE UP (ref 10.5–13.4)
RAPID RVP RESULT: SIGNIFICANT CHANGE UP
RBC # BLD: 5.46 M/UL — HIGH (ref 3.8–5.2)
RBC # FLD: 15.8 % — HIGH (ref 10.3–14.5)
SARS-COV-2 RNA SPEC QL NAA+PROBE: SIGNIFICANT CHANGE UP
SODIUM SERPL-SCNC: 142 MMOL/L — SIGNIFICANT CHANGE UP (ref 135–145)
TROPONIN I, HIGH SENSITIVITY RESULT: 8.2 NG/L — SIGNIFICANT CHANGE UP
WBC # BLD: 9.32 K/UL — SIGNIFICANT CHANGE UP (ref 3.8–10.5)
WBC # FLD AUTO: 9.32 K/UL — SIGNIFICANT CHANGE UP (ref 3.8–10.5)

## 2023-06-07 PROCEDURE — 80053 COMPREHEN METABOLIC PANEL: CPT

## 2023-06-07 PROCEDURE — 71045 X-RAY EXAM CHEST 1 VIEW: CPT | Mod: 26

## 2023-06-07 PROCEDURE — 0225U NFCT DS DNA&RNA 21 SARSCOV2: CPT

## 2023-06-07 PROCEDURE — 71275 CT ANGIOGRAPHY CHEST: CPT | Mod: MA

## 2023-06-07 PROCEDURE — 36415 COLL VENOUS BLD VENIPUNCTURE: CPT

## 2023-06-07 PROCEDURE — 99285 EMERGENCY DEPT VISIT HI MDM: CPT | Mod: 25

## 2023-06-07 PROCEDURE — 84484 ASSAY OF TROPONIN QUANT: CPT

## 2023-06-07 PROCEDURE — 85379 FIBRIN DEGRADATION QUANT: CPT

## 2023-06-07 PROCEDURE — 93005 ELECTROCARDIOGRAM TRACING: CPT

## 2023-06-07 PROCEDURE — 85610 PROTHROMBIN TIME: CPT

## 2023-06-07 PROCEDURE — 71275 CT ANGIOGRAPHY CHEST: CPT | Mod: 26,MA

## 2023-06-07 PROCEDURE — 93010 ELECTROCARDIOGRAM REPORT: CPT

## 2023-06-07 PROCEDURE — 83880 ASSAY OF NATRIURETIC PEPTIDE: CPT

## 2023-06-07 PROCEDURE — 71045 X-RAY EXAM CHEST 1 VIEW: CPT

## 2023-06-07 PROCEDURE — 85025 COMPLETE CBC W/AUTO DIFF WBC: CPT

## 2023-06-07 PROCEDURE — 85730 THROMBOPLASTIN TIME PARTIAL: CPT

## 2023-06-07 PROCEDURE — 99284 EMERGENCY DEPT VISIT MOD MDM: CPT

## 2023-06-07 RX ADMIN — Medication 1 TABLET(S): at 20:08

## 2023-06-07 NOTE — ED PROVIDER NOTE - PATIENT PORTAL LINK FT
You can access the FollowMyHealth Patient Portal offered by [facility] by registering at the following website: [email or web address]. By joining [employer]’s FollowMyHealth portal, you will also be able to view your health information using other applications (apps) compatible with our system.

## 2023-06-07 NOTE — ED ADULT NURSE NOTE - BIRTH SEX
limited by medical status; pt extubated this morning. In supine, (+) extension of U&LEs. Unable to assess infant in prone at this time.
Female

## 2023-06-07 NOTE — ED PROVIDER NOTE - CARE PROVIDER_API CALL
Mark Conklin  Pulmonary Disease  [address] [place] [address]  Phone: [phone]  Fax: [phone]  Follow Up Time: 1-3 Days

## 2023-06-07 NOTE — ED PROVIDER NOTE - CLINICAL SUMMARY MEDICAL DECISION MAKING FREE TEXT BOX
78-year-old female presents with has been having cough/URI over past 1 week.  Patient noticed this after having a routine colonoscopy.  This colonoscopy was reportedly uncomplicated.  No fever or chills.  No chest pain.  Minimal shortness of breath with activity.  Patient with persistent cough, so she went to urgent care today.  Patient had an x-ray which showed a small amount of fluid in the lung, was sent to the ED for further evaluation.  No acute abdominal pain.  No nausea or vomiting.  No acute diarrhea.  No known travel or immobilization.  No sick contacts.  No aggravating or alleviating factors otherwise noted.  No other acute injury or complaints.  Patient previously vaccinated for COVID.  Exam: Nontoxic, well-appearing.  CTA BL, W/R/R.  Normal cardiac exam.  Abdomen soft, nontender, nondistended.  Normal nonfocal neuro exam.  No C/C/E.  No other acute findings on exam.  Acute URI symptoms with mild shortness of breath over past 1 week.  Question of small fluid on chest x-ray as an outpatient.  Will check labs, CTA, IV, reeval.

## 2023-06-07 NOTE — ED PROVIDER NOTE - OBJECTIVE STATEMENT
78-year-old female with past medical history of CAD, colon cancer, former smoker, hypertension presents today due to a cough.  Patient reports that she had a colonoscopy on Monday in which she developed a cough afterward.  Patient was originally seen by PA to urgent care in which was advised to observe her symptoms.  Patient went to her doctor today in which had a chest x-ray was advised that she may have a blood clot.  Patient was advised that she also has some fluid in the lungs.  Patient admits to mild shortness of breath.  Patient denies chest pain, palpitations, leg swelling, calf pain, fever, sick contacts, abdominal pain, vomiting, runny nose, or any other complaints.

## 2023-06-07 NOTE — ED ADULT NURSE NOTE - OBJECTIVE STATEMENT
Pt presents today due to a cough. Patient reports that she had a colonoscopy on Monday in which she developed a cough afterward. Patient was originally seen by PA to urgent care in which was advised to observe her symptoms. Patient went to her doctor today in which had a chest x-ray was advised that she may have a blood clot.  Patient was advised that she also has some fluid in the lungs.  Patient admits to mild shortness of breath.  Patient denies chest pain, palpitations, leg swelling, calf pain, fever, sick contacts, abdominal pain, vomiting, runny nose, or any other complaints. family at bedside, pt on monitor, safety maintained, call bell within reach. Nursing care ongoing.

## 2023-06-07 NOTE — ED PROVIDER NOTE - PHYSICAL EXAMINATION
Constitutional: Awake, Alert, non-toxic. NAD. Well appearing, well nourished.   HEAD: Normocephalic, atraumatic.   EYES: EOM intact, conjunctiva and sclera are clear bilaterally.   ENT: No rhinorrhea, patent, mucous membranes pink/moist, no drooling or stridor.   NECK: Supple, non-tender  CARDIOVASCULAR: Normal S1, S2; regular rate and rhythm.  RESPIRATORY: Normal respiratory effort; (+) LLL rales, no wheezes. Speaking in full sentences. No accessory muscle use.   ABDOMEN: Soft; non-tender, non-distended.   EXTREMITIES: Full passive and active ROM in all extremities; non-tender to palpation; distal pulses palpable and symmetric no LE edema.   SKIN: Warm, dry; good skin turgor, no apparent lesions or rashes, no ecchymosis, brisk capillary refill.  NEURO: A&O x3. Sensory and motor functions are grossly intact. Speech is normal. Appearance and judgement seem appropriate for gender and age.;

## 2023-06-07 NOTE — ED ADULT NURSE NOTE - NSFALLUNIVINTERV_ED_ALL_ED
Bed/Stretcher in lowest position, wheels locked, appropriate side rails in place/Call bell, personal items and telephone in reach/Instruct patient to call for assistance before getting out of bed/chair/stretcher/Non-slip footwear applied when patient is off stretcher/[place] to call system/Physically safe environment - no spills, clutter or unnecessary equipment/Purposeful proactive rounding/Room/bathroom lighting operational, light cord in reach

## 2023-06-07 NOTE — ED ADULT TRIAGE NOTE - CHIEF COMPLAINT QUOTE
78yr old female a&ox4 arrives to ED from pcp notes instructed to come to ED r/o left lower lung embolism. pt notes cough and sob during activity, pt denies chest pain, fever or chills. Rafael MCKNIGHT

## 2023-06-07 NOTE — ED ADULT NURSE NOTE - IS THE PATIENT ABLE TO BE SCREENED?
Abdomen soft, nontender in RUQ/RLQ/LUQ/LLQ, nondistended , no guarding or rigidity , no masses palpable , unremarkable bowel sounds , no hepatosplenomegaly
Yes

## 2023-06-07 NOTE — ED PROVIDER NOTE - NSFOLLOWUPINSTRUCTIONS_ED_ALL_ED_FT
Follow-up with pulmonology.  Antibiotics were sent to your pharmacy.  Return for chest pain, shortness of breath, fever, worsening condition.      Community-Acquired Pneumonia, Adult    Pneumonia is a lung infection that causes inflammation and the buildup of mucus and fluids in the lungs. This may cause coughing and difficulty breathing. Community-acquired pneumonia is pneumonia that develops in people who are not, and have not recently been, in a hospital or other health care facility.    Usually, pneumonia develops as a result of an illness that is caused by a virus, such as the common cold and the flu (influenza). It can also be caused by bacteria or fungi. While the common cold and influenza can pass from person to person (are contagious), pneumonia itself is not considered contagious.    What are the causes?  The human body, showing how a virus travels from the air to a person's lungs.   This condition may be caused by:  Viruses.  Bacteria.  Fungi.  What increases the risk?  The following factors may make you more likely to develop this condition:  Being over age 65 or having certain medical conditions, such as:  A long-term (chronic) disease, such as: chronic obstructive pulmonary disease (COPD), asthma, heart failure, diabetes, or kidney disease.  A condition that increases the risk of breathing in (aspirating) mucus and other fluids from your mouth and nose.  A weakened body defense system (immune system).  Having had your spleen removed (splenectomy). The spleen is the organ that helps fight germs and infections.  Not cleaning your teeth and gums well (poor dental hygiene).  Using tobacco products.  Traveling to places where germs that cause pneumonia are present or being near certain animals or animal habitats that could have germs that cause pneumonia.  What are the signs or symptoms?  Symptoms of this condition include:  A dry cough or a wet (productive) cough.  A fever, sweating, or chills.  Chest pain, especially when breathing deeply or coughing.  Fast breathing, difficulty breathing, or shortness of breath.  Tiredness (fatigue) and muscle aches.  How is this diagnosed?  An outline of a person's body and an image of an X-ray of the person's chest.   This condition may be diagnosed based on your medical history or a physical exam. You may also have tests, including:  Imaging, such as a chest X-ray or lung ultrasound.  Tests of:  The level of oxygen and other gases in your blood.  Mucus from your lungs (sputum).  Fluid around your lungs (pleural fluid).  Your urine.  How is this treated?  Treatment for this condition depends on many factors, such as the cause of your pneumonia, your medicines, and other medical conditions that you have.    For most adults, pneumonia may be treated at home. In some cases, treatment must happen in a hospital and may include:  Medicines that are given by mouth (orally) or through an IV, including:  Antibiotic medicines, if bacteria caused the pneumonia.  Medicines that kill viruses (antiviral medicines), if a virus caused the pneumonia.  Oxygen therapy.  Severe pneumonia, although rare, may require the following treatments:  Mechanical ventilation.This procedure uses a machine to help you breathe if you cannot breathe well on your own or maintain a safe level of blood oxygen.  Thoracentesis. This procedure removes any buildup of pleural fluid to help with breathing.  Follow these instructions at home:  A comparison of three sample cups showing dark yellow, yellow, and pale yellow urine.  Medicines    Take over-the-counter and prescription medicines only as told by your health care provider.  Take cough medicine only if you have trouble sleeping. Cough medicine can prevent your body from removing mucus from your lungs.  If you were prescribed antibiotics, take them as told by your health care provider. Do not stop taking the antibiotic even if you start to feel better.  Lifestyle    A sign showing that a person should not drink alcohol.  A sign showing that a person should not smoke.  Do not drink alcohol.  Do not use any products that contain nicotine or tobacco. These products include cigarettes, chewing tobacco, and vaping devices, such as e-cigarettes. If you need help quitting, ask your health care provider.  Eat a healthy diet. This includes plenty of vegetables, fruits, whole grains, low-fat dairy products, and lean protein.  General instructions    Rest a lot and get at least 8 hours of sleep each night.  Sleep in a partly upright position at night. Place a few pillows under your head or sleep in a reclining chair.  Return to your normal activities as told by your health care provider. Ask your health care provider what activities are safe for you.  Drink enough fluid to keep your urine pale yellow. This helps to thin the mucus in your lungs.  If your throat is sore, gargle with a mixture of salt and water 3–4 times a day or as needed. To make salt water, completely dissolve ½–1 tsp (3–6 g) of salt in 1 cup (237 mL) of warm water.  Keep all follow-up visits.  How is this prevented?  You can lower your risk of developing community-acquired pneumonia by:  Getting the pneumonia vaccine. There are different types and schedules of pneumonia vaccines. Ask your health care provider which option is best for you. Consider getting the pneumonia vaccine if:  You are older than 65 years of age.  You are 19–65 years of age and are receiving cancer treatment, have chronic lung disease, or have other medical conditions that affect your immune system. Ask your health care provider if this applies to you.  Getting your influenza vaccine every year. Ask your health care provider which type of vaccine is best for you.  Getting regular dental checkups.  Washing your hands often with soap and water for at least 20 seconds. If soap and water are not available, use hand .  Contact a health care provider if:  You have a fever.  You have trouble sleeping because you cannot control your cough with cough medicine.  Get help right away if:  Your shortness of breath becomes worse.  Your chest pain increases.  Your sickness becomes worse, especially if you are an older adult or have a weak immune system.  You cough up blood.  These symptoms may be an emergency. Get help right away. Call 911.  Do not wait to see if the symptoms will go away.  Do not drive yourself to the hospital.  Summary  Pneumonia is an infection of the lungs.  Community-acquired pneumonia develops in people who have not been in the hospital. It can be caused by bacteria, viruses, or fungi.  This condition may be treated with antibiotics or antiviral medicines.  Severe pneumonia may require a hospital stay and treatment to help with breathing.  This information is not intended to replace advice given to you by your health care provider. Make sure you discuss any questions you have with your health care provider.

## 2023-06-28 NOTE — BRIEF OPERATIVE NOTE - BRIEF OP NOTE DRAINS
Using a Smartneedle with the Modified Seldinger technique the right femoral vein was succesfully accessed, under fluoroscopic guidance using a INTRODUCER SHTH 8FR 50CM 12CM GW HEMOSTASIS VLBONG MCDUFFIET DIL. 2 mediastinal  1 channel drain in left pleural cavity

## 2023-11-27 NOTE — ED PROVIDER NOTE - DISPOSITION TYPE
Spoke with patient over the phone.  Informed patient that Dr. Anderson has reviewed her blood sugar log and has made the following changes.  Increase Levemir to 16 units q am and Keep Novolog 4 units at breakfast but increase Novolog to 6 units at lunch and dinner.  Patient voices understanding.  Mariya Mandel RN  
DISCHARGE

## 2024-01-17 ENCOUNTER — APPOINTMENT (OUTPATIENT)
Dept: CV DIAGNOSTICS | Facility: HOSPITAL | Age: 79
End: 2024-01-17

## 2024-04-25 NOTE — ASU PREOP CHECKLIST - LATEX ALLERGY
Please schedule bone density after 5/03/24.  Check labs: CBC with diff, CMP, CRP.  Recommend low impact exercise because of left hip arthritis.  Follow-up in 3 months.    
no

## 2024-04-30 ENCOUNTER — APPOINTMENT (OUTPATIENT)
Dept: SURGERY | Facility: CLINIC | Age: 79
End: 2024-04-30

## 2024-05-03 ENCOUNTER — APPOINTMENT (OUTPATIENT)
Dept: SURGERY | Facility: CLINIC | Age: 79
End: 2024-05-03
Payer: MEDICARE

## 2024-05-03 VITALS
WEIGHT: 139 LBS | HEIGHT: 61 IN | HEART RATE: 63 BPM | TEMPERATURE: 98 F | DIASTOLIC BLOOD PRESSURE: 73 MMHG | SYSTOLIC BLOOD PRESSURE: 132 MMHG | BODY MASS INDEX: 26.24 KG/M2 | OXYGEN SATURATION: 97 %

## 2024-05-03 DIAGNOSIS — Z95.1 PRESENCE OF AORTOCORONARY BYPASS GRAFT: ICD-10-CM

## 2024-05-03 DIAGNOSIS — M54.30 SCIATICA, UNSPECIFIED SIDE: ICD-10-CM

## 2024-05-03 PROCEDURE — 99203 OFFICE O/P NEW LOW 30 MIN: CPT

## 2024-05-14 PROBLEM — Z95.1 S/P CABG X 3: Status: ACTIVE | Noted: 2020-11-27

## 2024-05-14 PROBLEM — M54.30 SCIATICA: Status: ACTIVE | Noted: 2019-01-14

## 2024-05-14 NOTE — ASSESSMENT
[FreeTextEntry1] : Right hip pain. Sono Suggesting a small right inguinal hernia.  ([facility]). Hernia not appreciated on physical exam.  Will send for CT scan to [place] for inguinal hernia.  f/u upon completion.  Hernia, if present, is not likely the cause of her Right hip pain.  consider ortho f/u to assess for arthritis.

## 2024-05-14 NOTE — PHYSICAL EXAM
[Normal Breath Sounds] : Normal breath sounds [Normal Heart Sounds] : normal heart sounds [Normal Rate and Rhythm] : normal rate and rhythm [No Rash or Lesion] : No rash or lesion [Alert] : alert [Oriented to Person] : oriented to person [Oriented to Place] : oriented to place [Oriented to Time] : oriented to time [Calm] : calm [de-identified] : Elderly woman, no distress  [de-identified] : HERON MEDINA EOMI [de-identified] : Sternotomy scar [de-identified] : Soft, nontender nondistended, positive bowel sounds in all four quads.  No hernia or masses. No rebound or guarding. [de-identified] : Ambulating without difficulty or assistance

## 2024-06-06 ENCOUNTER — OUTPATIENT (OUTPATIENT)
Dept: OUTPATIENT SERVICES | Facility: HOSPITAL | Age: 79
LOS: 1 days | End: 2024-06-06
Payer: COMMERCIAL

## 2024-06-06 VITALS
HEART RATE: 81 BPM | DIASTOLIC BLOOD PRESSURE: 70 MMHG | HEIGHT: 60 IN | WEIGHT: 128.09 LBS | SYSTOLIC BLOOD PRESSURE: 156 MMHG | OXYGEN SATURATION: 97 % | RESPIRATION RATE: 16 BRPM | TEMPERATURE: 98 F

## 2024-06-06 VITALS
DIASTOLIC BLOOD PRESSURE: 72 MMHG | RESPIRATION RATE: 16 BRPM | HEART RATE: 76 BPM | SYSTOLIC BLOOD PRESSURE: 149 MMHG | OXYGEN SATURATION: 98 %

## 2024-06-06 DIAGNOSIS — I20.0 UNSTABLE ANGINA: ICD-10-CM

## 2024-06-06 LAB
ANION GAP SERPL CALC-SCNC: 15 MMOL/L — SIGNIFICANT CHANGE UP (ref 5–17)
BUN SERPL-MCNC: 17 MG/DL — SIGNIFICANT CHANGE UP (ref 7–23)
CALCIUM SERPL-MCNC: 9.6 MG/DL — SIGNIFICANT CHANGE UP (ref 8.4–10.5)
CHLORIDE SERPL-SCNC: 104 MMOL/L — SIGNIFICANT CHANGE UP (ref 96–108)
CO2 SERPL-SCNC: 23 MMOL/L — SIGNIFICANT CHANGE UP (ref 22–31)
CREAT SERPL-MCNC: 0.71 MG/DL — SIGNIFICANT CHANGE UP (ref 0.5–1.3)
EGFR: 86 ML/MIN/1.73M2 — SIGNIFICANT CHANGE UP
GLUCOSE SERPL-MCNC: 94 MG/DL — SIGNIFICANT CHANGE UP (ref 70–99)
HCT VFR BLD CALC: 38.5 % — SIGNIFICANT CHANGE UP (ref 34.5–45)
HGB BLD-MCNC: 12.1 G/DL — SIGNIFICANT CHANGE UP (ref 11.5–15.5)
MCHC RBC-ENTMCNC: 21.5 PG — LOW (ref 27–34)
MCHC RBC-ENTMCNC: 31.4 GM/DL — LOW (ref 32–36)
MCV RBC AUTO: 68.3 FL — LOW (ref 80–100)
NRBC # BLD: 0 /100 WBCS — SIGNIFICANT CHANGE UP (ref 0–0)
PLATELET # BLD AUTO: 199 K/UL — SIGNIFICANT CHANGE UP (ref 150–400)
POTASSIUM SERPL-MCNC: 3.7 MMOL/L — SIGNIFICANT CHANGE UP (ref 3.5–5.3)
POTASSIUM SERPL-SCNC: 3.7 MMOL/L — SIGNIFICANT CHANGE UP (ref 3.5–5.3)
RBC # BLD: 5.64 M/UL — HIGH (ref 3.8–5.2)
RBC # FLD: 16.8 % — HIGH (ref 10.3–14.5)
SODIUM SERPL-SCNC: 142 MMOL/L — SIGNIFICANT CHANGE UP (ref 135–145)
WBC # BLD: 5.56 K/UL — SIGNIFICANT CHANGE UP (ref 3.8–10.5)
WBC # FLD AUTO: 5.56 K/UL — SIGNIFICANT CHANGE UP (ref 3.8–10.5)

## 2024-06-06 PROCEDURE — 93010 ELECTROCARDIOGRAM REPORT: CPT

## 2024-06-06 PROCEDURE — C1769: CPT

## 2024-06-06 PROCEDURE — 80048 BASIC METABOLIC PNL TOTAL CA: CPT

## 2024-06-06 PROCEDURE — 93005 ELECTROCARDIOGRAM TRACING: CPT

## 2024-06-06 PROCEDURE — 93459 L HRT ART/GRFT ANGIO: CPT

## 2024-06-06 PROCEDURE — C1894: CPT

## 2024-06-06 PROCEDURE — C1887: CPT

## 2024-06-06 PROCEDURE — 93459 L HRT ART/GRFT ANGIO: CPT | Mod: 26,59

## 2024-06-06 PROCEDURE — 85027 COMPLETE CBC AUTOMATED: CPT

## 2024-06-06 RX ORDER — METHIMAZOLE 10 MG/1
1 TABLET ORAL
Refills: 0 | DISCHARGE

## 2024-06-06 RX ORDER — METOPROLOL TARTRATE 50 MG
1 TABLET ORAL
Refills: 0 | DISCHARGE

## 2024-06-06 RX ORDER — AMLODIPINE BESYLATE 2.5 MG/1
1 TABLET ORAL
Refills: 0 | DISCHARGE

## 2024-06-06 NOTE — ASU DISCHARGE PLAN (ADULT/PEDIATRIC) - ASU DC SPECIAL INSTRUCTIONSFT

## 2024-06-06 NOTE — ASU DISCHARGE PLAN (ADULT/PEDIATRIC) - NS MD DC FALL RISK RISK
For information on Fall & Injury Prevention, visit: [email or web address] OR  [email or web address] OR  https://www.cdc.gov/steadi/patient.html

## 2024-06-06 NOTE — ASU DISCHARGE PLAN (ADULT/PEDIATRIC) - CARE PROVIDER_API CALL
Mitali Shaw)  Interventional Cardiology  [address] [place] [address], [address]  [place] [address]  Phone: [phone]  Fax: [phone]  Follow Up Time:

## 2024-06-06 NOTE — H&P CARDIOLOGY - HISTORY OF PRESENT ILLNESS
79 year old female with HTN, HLD, CAD s/p CABG (3v, 11/4/2020), poor historian with likely dementia who presents today for a LHC with her cardiologist Dr. Shaw. Per outpt records she has been experiencing shortness of breath accompanied by angina for the past month only being able to walk one flight of stairs. In addition she has been having more swelling in her legs. She will undergo LHC today.  signed consent and reports that she has been more forgetful as of late and is being followed by neurology. She currently denies chest pain, SOB, dizziness/numbness.

## 2024-06-18 ENCOUNTER — EMERGENCY (EMERGENCY)
Facility: HOSPITAL | Age: 79
LOS: 1 days | Discharge: HOME | End: 2024-06-18
Attending: STUDENT IN AN ORGANIZED HEALTH CARE EDUCATION/TRAINING PROGRAM | Admitting: STUDENT IN AN ORGANIZED HEALTH CARE EDUCATION/TRAINING PROGRAM
Payer: COMMERCIAL

## 2024-06-18 VITALS
TEMPERATURE: 98 F | OXYGEN SATURATION: 98 % | WEIGHT: 134.92 LBS | HEART RATE: 54 BPM | HEIGHT: 60 IN | DIASTOLIC BLOOD PRESSURE: 63 MMHG | SYSTOLIC BLOOD PRESSURE: 142 MMHG | RESPIRATION RATE: 16 BRPM

## 2024-06-18 PROCEDURE — 99284 EMERGENCY DEPT VISIT MOD MDM: CPT

## 2024-06-18 RX ORDER — LIDOCAINE 4 G/100G
1 CREAM TOPICAL ONCE
Refills: 0 | Status: COMPLETED | OUTPATIENT
Start: 2024-06-18 | End: 2024-06-18

## 2024-06-18 RX ORDER — CYCLOBENZAPRINE HYDROCHLORIDE 10 MG/1
10 TABLET, FILM COATED ORAL ONCE
Refills: 0 | Status: COMPLETED | OUTPATIENT
Start: 2024-06-18 | End: 2024-06-18

## 2024-06-18 RX ORDER — KETOROLAC TROMETHAMINE 30 MG/ML
30 SYRINGE (ML) INJECTION ONCE
Refills: 0 | Status: DISCONTINUED | OUTPATIENT
Start: 2024-06-18 | End: 2024-06-18

## 2024-06-18 RX ADMIN — CYCLOBENZAPRINE HYDROCHLORIDE 10 MILLIGRAM(S): 10 TABLET, FILM COATED ORAL at 23:23

## 2024-06-18 NOTE — ED PROVIDER NOTE - PATIENT PORTAL LINK FT
You can access the FollowMyHealth Patient Portal offered by [facility] by registering at the following website: [email or web address]. By joining [facility]’s FollowMyHealth portal, you will also be able to view your health information using other applications (apps) compatible with our system.

## 2024-06-18 NOTE — ED PROVIDER NOTE - MUSCULOSKELETAL, MLM
Spine appears normal, range of motion is not limited, no muscle or joint tenderness. Gait steady.  Hips and pelvis stable.  No LS spine tenderness, ecchymosis, or deformity.  +Straight leg raise on the right.  Normal sensation, 5/5 RLE strength

## 2024-06-18 NOTE — ED PROVIDER NOTE - CLINICAL SUMMARY MEDICAL DECISION MAKING FREE TEXT BOX
79 year old female p/w right-sided low back pain x 3 month, intermittent, worse with movement. Radiates to her right buttock and leg.  Patient also reports right groin pain since her cardiac cath on 6/6, no fever, redness, swelling. No dysuria, hematuria, n/v/d.  Check CT hip, L-spine, analgesia, ortho follow up

## 2024-06-18 NOTE — ED PROVIDER NOTE - CARE PROVIDERS DIRECT ADDRESSES
[email or web address]

## 2024-06-18 NOTE — ED ADULT NURSE NOTE - NSFALLRISKINTERV_ED_ALL_ED

## 2024-06-18 NOTE — ED ADULT NURSE NOTE - SUICIDE SCREENING QUESTION 2
LRD No You may eat a low fiber diet and shower, but please do not soak your wounds in a bath or pool. Pat Dry abdomen. L  Staples to be removed in the office.   Activity as tolerated   Please do not drive until your pain is well controlled, and you do not need to take pain medications. These medications may make you constipated. If so, please take over the counter stool softeners for symptoms.   You may take over the counter pain medication like tylenol, but do not take tylenol with percocet as you may exceed maximum dosage.   NOTIFY YOUR SURGEON IF: You have any bleeding that does not stop, any pus draining from your wound, any fever (over 100.4 F) or chills, persistent nausea/vomiting, persistent diarrhea, or if your pain is not controlled on your discharge pain medications.

## 2024-06-18 NOTE — ED PROVIDER NOTE - CHPI ED SYMPTOMS NEG
no anorexia/no bladder dysfunction/no bowel dysfunction/no constipation/no neck tenderness/no numbness/no tingling/no difficulty bearing weight/no motor function loss

## 2024-06-18 NOTE — ED PROVIDER NOTE - DIFFERENTIAL DIAGNOSIS
Differential Diagnosis Ddx includes but not limited to sciatica, radiculopathy, herniated disc, cellulitis, kidney stone, hernia, UTI, sacroiliitis, disc space infection, spinal stenosis

## 2024-06-18 NOTE — ED PROVIDER NOTE - OBJECTIVE STATEMENT
79-year-old female with a history of: colon cancer, HTN presents with right sided low back pain.  Patient states the pain has been occurring intermittently for the past 3 months.  She is taking no medication for the pain.  She denies any history of trauma, falls, heavy lifting.  Patient reports an aching sensation to her right lower back that radiates down her right buttocks and to her right lower leg. It is worse with movement. No associated numbness, gait disturbance, urinary/fecal incontinence.  Patient had an MRI of her lower back approximately 1 month ago, ordered by her cardiologist.  Per the  the MRI was unremarkable.  In addition patient states she had a cardiac cath on June 6th, has a follow-up in 3 days but reports right groin pain from the site of the angiogram.  No fever, swelling.  PMD Dr. Mohseni

## 2024-06-18 NOTE — ED PROVIDER NOTE - CARE PROVIDER_API CALL
Mohseni, Haleh G  [address] [place] [address]  Phone: [phone]  Fax: [phone]  Follow Up Time:     Partha Cadena  Urology  [address] [facility] [facility] [address]  [place] [address]  Phone: [phone]  Fax: [phone]  Follow Up Time:

## 2024-06-18 NOTE — ED PROVIDER NOTE - CARE PLAN
1 Principal Discharge DX:	Right kidney stone   Principal Discharge DX:	Right kidney stone  Secondary Diagnosis:	Sciatica

## 2024-06-18 NOTE — ED PROVIDER NOTE - NSFOLLOWUPINSTRUCTIONS_ED_ALL_ED_FT
Please take the medication as prescribed and follow up urology and your primary care doctor.  Return to the ER for persistent pain, vomiting, fever, diarrhea, bloody urine, gait disturbance, weakness, or any other concerns.     Kidney Stones       Kidney stones are solid, rock-like deposits that form inside of the kidneys. The kidneys are a pair of organs that make urine. A kidney stone may form in a kidney and move into other parts of the urinary tract, including the tubes that connect the kidneys to the bladder (ureters), the bladder, and the tube that carries urine out of the body (urethra). As the stone moves through these areas, it can cause intense pain and block the flow of urine.    Kidney stones are created when high levels of certain minerals are found in the urine. The stones are usually passed out of the body through urination, but in some cases, medical treatment may be needed to remove them.    What are the causes?  Kidney stones may be caused by:    A condition in which certain glands produce too much parathyroid hormone (primary hyperparathyroidism), which causes too much calcium buildup in the blood.  A buildup of uric acid crystals in the bladder (hyperuricosuria). Uric acid is a chemical that the body produces when you eat certain foods. It usually exits the body in the urine.  Narrowing (stricture) of one or both of the ureters.  A kidney blockage that is present at birth (congenital obstruction).  Past surgery on the kidney or the ureters, such as gastric bypass surgery.    What increases the risk?  The following factors may make you more likely to develop this condition:    Having had a kidney stone in the past.  Having a family history of kidney stones.  Not drinking enough water.  Eating a diet that is high in protein, salt (sodium), or sugar.  Being overweight or obese.    What are the signs or symptoms?  Symptoms of a kidney stone may include:    Pain in the side of the abdomen, right below the ribs (flank pain). Pain usually spreads (radiates) to the groin.  Needing to urinate frequently or urgently.  Painful urination.  Blood in the urine (hematuria).  Nausea.  Vomiting.  Fever and chills.    How is this diagnosed?  This condition may be diagnosed based on:    Your symptoms and medical history.  A physical exam.  Blood tests.  Urine tests. These may be done before and after the stone passes out of your body through urination.  Imaging tests, such as a CT scan, abdominal X-ray, or ultrasound.  A procedure to examine the inside of the bladder (cystoscopy).    How is this treated?  Treatment for kidney stones depends on the size, location, and makeup of the stones. Kidney stones will often pass out of the body through urination. You may need to:    Increase your fluid intake to help pass the stone. In some cases, you may be given fluids through an IV and may need to be monitored at the hospital.  Take medicine for pain.  Make changes in your diet to help prevent kidney stones from coming back.    Sometimes, medical procedures are needed to remove a kidney stone. This may involve:    A procedure to break up kidney stones using:    A focused beam of light (laser therapy).  Shock waves (extracorporeal shock wave lithotripsy).  Surgery to remove kidney stones. This may be needed if you have severe pain or have stones that block your urinary tract.    Follow these instructions at home:      Medicines    Take over-the-counter and prescription medicines only as told by your health care provider.  Ask your health care provider if the medicine prescribed to you requires you to avoid driving or using heavy machinery.        Eating and drinking    Drink enough fluid to keep your urine pale yellow. You may be instructed to drink at least 8–10 glasses of water each day. This will help you pass the kidney stone.  If directed, change your diet. This may include:    Limiting how much sodium you eat.  Eating more fruits and vegetables.  Limiting how much animal protein—such as red meat, poultry, fish, and eggs—you eat.  Follow instructions from your health care provider about eating or drinking restrictions.        General instructions    Collect urine samples as told by your health care provider. You may need to collect a urine sample:    24 hours after you pass the stone.  8–12 weeks after passing the kidney stone, and every 6–12 months after that.  Strain your urine every time you urinate, for as long as directed. Use the strainer that your health care provider recommends.  Do not throw out the kidney stone after passing it. Keep the stone so it can be tested by your health care provider. Testing the makeup of your kidney stone may help prevent you from getting kidney stones in the future.  Keep all follow-up visits as told by your health care provider. This is important. You may need follow-up X-rays or ultrasounds to make sure that your stone has passed.    How is this prevented?     To prevent another kidney stone:    Drink enough fluid to keep your urine pale yellow. This is the best way to prevent kidney stones.  Eat a healthy diet and follow recommendations from your health care provider about foods to avoid. You may be instructed to eat a low-protein diet. Recommendations vary depending on the type of kidney stone that you have.  Maintain a healthy weight.    Where to find more information  National Kidney Foundation (NKF): www.kidney.org  [employer] (F): www.urologyhealth.org    Contact a health care provider if:  You have pain that gets worse or does not get better with medicine.    Get help right away if:  You have a fever or chills.  You develop severe pain.  You develop new abdominal pain.  You faint.  You are unable to urinate.    Summary  Kidney stones are solid, rock-like deposits that form inside of the kidneys.  Kidney stones can cause nausea, vomiting, blood in the urine, abdominal pain, and the urge to urinate frequently.  Treatment for kidney stones depends on the size, location, and makeup of the stones. Kidney stones will often pass out of the body through urination.  Kidney stones can be prevented by drinking enough fluids, eating a healthy diet, and maintaining a healthy weight.    ADDITIONAL NOTES AND INSTRUCTIONS    Please follow up with your Primary MD in 24-48 hr.  Seek immediate medical care for any new/worsening signs or symptoms.

## 2024-06-18 NOTE — ED ADULT NURSE NOTE - OBJECTIVE STATEMENT
Pt presenting with R lower back pain radiating down her leg. Pt had a recent angiogram and will follow up this friday. Denies n/v/d, no dysuria. Pt is a&ox4, ambulatory at baseline, Resting in bed, rails up and wheels locked in place.

## 2024-06-18 NOTE — ED PROVIDER NOTE - PROGRESS NOTE DETAILS
Results of work up d/w patient and , copies of all reports given.  Patient reports feeling better after Flexeril, she is declining anything stronger for pain, declining Zofran. Aware of CT showing 3mm right ureteral stone. She denies n/v, dysuria, hematuria.  CT hip with possible right groin superficial cellulitis. Will treat with analgesia, abx, and close urology/PMD follow up.  Patient is scheduled to see her cardiologist on 6/21. Return precautions discussed.   at bedside to take patient home, who expressed understanding of discharge instructions.

## 2024-06-19 VITALS
OXYGEN SATURATION: 99 % | HEART RATE: 59 BPM | RESPIRATION RATE: 18 BRPM | TEMPERATURE: 99 F | SYSTOLIC BLOOD PRESSURE: 158 MMHG | DIASTOLIC BLOOD PRESSURE: 73 MMHG

## 2024-06-19 LAB
APPEARANCE UR: CLEAR — SIGNIFICANT CHANGE UP
BACTERIA # UR AUTO: ABNORMAL /HPF
BILIRUB UR-MCNC: NEGATIVE — SIGNIFICANT CHANGE UP
COLOR SPEC: YELLOW — SIGNIFICANT CHANGE UP
DIFF PNL FLD: ABNORMAL
EPI CELLS # UR: PRESENT
GLUCOSE UR QL: NEGATIVE MG/DL — SIGNIFICANT CHANGE UP
KETONES UR-MCNC: NEGATIVE MG/DL — SIGNIFICANT CHANGE UP
LEUKOCYTE ESTERASE UR-ACNC: ABNORMAL
NITRITE UR-MCNC: NEGATIVE — SIGNIFICANT CHANGE UP
PH UR: 7.5 — SIGNIFICANT CHANGE UP (ref 5–8)
PROT UR-MCNC: NEGATIVE MG/DL — SIGNIFICANT CHANGE UP
RBC CASTS # UR COMP ASSIST: 3 /HPF — SIGNIFICANT CHANGE UP (ref 0–4)
SP GR SPEC: 1.01 — SIGNIFICANT CHANGE UP (ref 1–1.03)
UROBILINOGEN FLD QL: 0.2 MG/DL — SIGNIFICANT CHANGE UP (ref 0.2–1)
WBC UR QL: 2 /HPF — SIGNIFICANT CHANGE UP (ref 0–5)

## 2024-06-19 PROCEDURE — 81001 URINALYSIS AUTO W/SCOPE: CPT

## 2024-06-19 PROCEDURE — 72192 CT PELVIS W/O DYE: CPT | Mod: MC

## 2024-06-19 PROCEDURE — 87086 URINE CULTURE/COLONY COUNT: CPT

## 2024-06-19 PROCEDURE — 99284 EMERGENCY DEPT VISIT MOD MDM: CPT | Mod: 25

## 2024-06-19 PROCEDURE — 72131 CT LUMBAR SPINE W/O DYE: CPT | Mod: 26,MC

## 2024-06-19 PROCEDURE — 72131 CT LUMBAR SPINE W/O DYE: CPT | Mod: MC

## 2024-06-19 PROCEDURE — 72192 CT PELVIS W/O DYE: CPT | Mod: 26,MC

## 2024-06-19 RX ORDER — CYCLOBENZAPRINE HYDROCHLORIDE 10 MG/1
1 TABLET, FILM COATED ORAL
Qty: 15 | Refills: 0
Start: 2024-06-19 | End: 2024-06-23

## 2024-06-19 RX ORDER — CEPHALEXIN 500 MG
1 CAPSULE ORAL
Qty: 14 | Refills: 0
Start: 2024-06-19 | End: 2024-06-25

## 2024-06-20 LAB
CULTURE RESULTS: SIGNIFICANT CHANGE UP
SPECIMEN SOURCE: SIGNIFICANT CHANGE UP

## 2024-06-21 ENCOUNTER — EMERGENCY (EMERGENCY)
Facility: HOSPITAL | Age: 79
LOS: 1 days | Discharge: HOME | End: 2024-06-21
Attending: EMERGENCY MEDICINE | Admitting: EMERGENCY MEDICINE
Payer: COMMERCIAL

## 2024-06-21 VITALS
WEIGHT: 130.95 LBS | HEIGHT: 60 IN | OXYGEN SATURATION: 99 % | DIASTOLIC BLOOD PRESSURE: 67 MMHG | HEART RATE: 71 BPM | TEMPERATURE: 98 F | RESPIRATION RATE: 17 BRPM | SYSTOLIC BLOOD PRESSURE: 146 MMHG

## 2024-06-21 PROCEDURE — 99285 EMERGENCY DEPT VISIT HI MDM: CPT

## 2024-06-21 RX ORDER — ACETAMINOPHEN 500 MG
1000 TABLET ORAL ONCE
Refills: 0 | Status: COMPLETED | OUTPATIENT
Start: 2024-06-21 | End: 2024-06-21

## 2024-06-21 RX ORDER — SODIUM CHLORIDE 9 MG/ML
1000 INJECTION INTRAMUSCULAR; INTRAVENOUS; SUBCUTANEOUS ONCE
Refills: 0 | Status: COMPLETED | OUTPATIENT
Start: 2024-06-21 | End: 2024-06-21

## 2024-06-21 NOTE — ED PROVIDER NOTE - CARE PLAN
Principal Discharge DX:	Abdominal pain   1 Principal Discharge DX:	Abdominal pain  Secondary Diagnosis:	Renal stone

## 2024-06-21 NOTE — ED PROVIDER NOTE - NSFOLLOWUPINSTRUCTIONS_ED_ALL_ED_FT
1) Follow-up with your Primary Medical Doctor and urologist. Call today / next business day for prompt follow-up.  2) Return to Emergency room for any worsening or persistent pain, weakness, fever, or any other concerning symptoms.  3) See attached instruction sheets for additional information, including information regarding signs and symptoms to look out for, reasons to seek immediate care and other important instructions.  4) Follow-up with any specialists as discussed / noted as well.  5) Continue your medications as prescribed

## 2024-06-21 NOTE — ED ADULT TRIAGE NOTE - CHIEF COMPLAINT QUOTE
pt to triage c/o R leg/hip pain x 1 week. hx of sciatica. pt has difficulty ambulating. denies trauma, weakness. hx of open heart surgery 2020.

## 2024-06-21 NOTE — ED PROVIDER NOTE - PATIENT PORTAL LINK FT
You can access the FollowMyHealth Patient Portal offered by [facility] by registering at the following website: [email or web address]. By joining [facility]’s FollowMyHealth portal, you will also be able to view your health information using other applications (apps) compatible with our system.

## 2024-06-21 NOTE — ED PROVIDER NOTE - PROGRESS NOTE DETAILS
Patient reassessed feeling much better labs and imaging explained.  Patient already knows about her renal stone.  Ultrasound results explained.  Will return if any worsening symptoms.

## 2024-06-21 NOTE — ED PROVIDER NOTE - CARE PROVIDER_API CALL
[facility]  Urology  [address] [place] [id number]  Phone: [phone]  Fax: [phone]  Follow Up Time:

## 2024-06-21 NOTE — ED PROVIDER NOTE - CLINICAL SUMMARY MEDICAL DECISION MAKING FREE TEXT BOX
Patient is a 79-year-old female who presents emergency room 3 days after evaluation for inguinal pain hip pain atraumatically.  Approximately 10 days before that she had an angiogram in the right groin to the heart to assess for coronary vascular disease.  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  Without any hematoma.  Patient was discharged home with antibiotics to prevent did not UTI, and muscle relaxants.  According to the history the  provided, they were unable to get the prescription for the muscle relaxant filled.  Patient presents tonight with sudden onset of this similar right groin pain radiating down into her leg causing difficulty with ambulation.  Unsure if she has a hernia, status post colon cancer surgery remotely.  Given the limitations of the pelvis and lumbar CT, will obtain a CAT scan of the abdomen and pelvis with IV contrast, ultrasound of the right upper quadrant, chest x-ray, belly labs including lipase electrolytes renal function urinalysis to assess for blood.  Patient has no localizing neurologic symptoms.  She has no straight leg raise.  No bony back pain to percussion of the back or spine.  No CVA tenderness.  No urine symptoms.  Disposition accordingly.  This chart was made with dictation software and may contain typographical errors.

## 2024-06-21 NOTE — ED PROVIDER NOTE - PROVIDER TOKENS
Detail Level: Detailed Detail Level: Generalized Patient Specific Counseling (Will Not Stick From Patient To Patient): A total of 1 lesion was treated with liquid nitrogen for 1 freeze-thaw cycle lasting 10 seconds, located on the right lateral forehead.\\nThis procedure was medically necessary because the lesions that were treated were: contagious. The patient's consent was obtained including but not limited to risks of crusting, scabbing, blistering, scarring, darker or lighter pigmentary change, recurrence, incomplete removal and infection. PROVIDER:[TOKEN:[[id number]]]

## 2024-06-21 NOTE — ED PROVIDER NOTE - MUSCULOSKELETAL, MLM
Spine appears normal, range of motion is not limited, no muscle or joint tenderness pt has no slr, pt has no motor weakness no numbness. on the r hip overlying the hip is a loose fatty tissue body that pt also finds tender.

## 2024-06-21 NOTE — ED PROVIDER NOTE - OBJECTIVE STATEMENT
pmd dr Mohseni bethpage  cards dr Albert in [place]  Patient is a 79-year-old female who has a history of CABG x 3 no MI, colon resection status post colon cancer remotely.  Underwent diagnostic angiogram on 6/6/2024 at [facility].  On 6/18/2024 just 3 days ago she presented to this emergency room with pain in her right hip and abdomen.  Out of an abundance of concern for complication from the procedure on the sixth CT imaging was done of her pelvis to rule out fracture given patient's sensation of weakness and abdominal pain.  CT imaging of the lumbar spine was also obtained which demonstrated spondylolisthesis.  She presents emergency room this evening with continued pain, difficulty walking.  Patient has a sensation of weakness without neurologic changes.  No sensory changes.  No changes in bowel or bladder habits.  No cough fever chills.  No chest pain or shortness of breath.  No bruising or ecchymosis.  No palpable hernia.  When asked where her source of pain is she points over her lateral hip (see physical examination below).  Never smoker not a drinker.

## 2024-06-21 NOTE — ED PROVIDER NOTE - GASTROINTESTINAL, MLM
Abdomen soft, but pt has tenderness along the enetirety of the r side of abd including ruq no cvat she also has pain on palp over r inguinal region

## 2024-06-22 VITALS
TEMPERATURE: 98 F | OXYGEN SATURATION: 95 % | HEART RATE: 71 BPM | SYSTOLIC BLOOD PRESSURE: 140 MMHG | RESPIRATION RATE: 17 BRPM | DIASTOLIC BLOOD PRESSURE: 80 MMHG

## 2024-06-22 LAB
ALBUMIN SERPL ELPH-MCNC: 3.8 G/DL — SIGNIFICANT CHANGE UP (ref 3.3–5)
ALP SERPL-CCNC: 69 U/L — SIGNIFICANT CHANGE UP (ref 40–120)
ALT FLD-CCNC: 19 U/L — SIGNIFICANT CHANGE UP (ref 12–78)
ANION GAP SERPL CALC-SCNC: 7 MMOL/L — SIGNIFICANT CHANGE UP (ref 5–17)
ANISOCYTOSIS BLD QL: SLIGHT — SIGNIFICANT CHANGE UP
APPEARANCE UR: CLEAR — SIGNIFICANT CHANGE UP
APTT BLD: 25 SEC — SIGNIFICANT CHANGE UP (ref 24.5–35.6)
AST SERPL-CCNC: 18 U/L — SIGNIFICANT CHANGE UP (ref 15–37)
BASOPHILS # BLD AUTO: 0.08 K/UL — SIGNIFICANT CHANGE UP (ref 0–0.2)
BASOPHILS NFR BLD AUTO: 0.8 % — SIGNIFICANT CHANGE UP (ref 0–2)
BILIRUB SERPL-MCNC: 1 MG/DL — SIGNIFICANT CHANGE UP (ref 0.2–1.2)
BILIRUB UR-MCNC: NEGATIVE — SIGNIFICANT CHANGE UP
BUN SERPL-MCNC: 18 MG/DL — SIGNIFICANT CHANGE UP (ref 7–23)
CALCIUM SERPL-MCNC: 9.3 MG/DL — SIGNIFICANT CHANGE UP (ref 8.5–10.1)
CHLORIDE SERPL-SCNC: 110 MMOL/L — HIGH (ref 96–108)
CO2 SERPL-SCNC: 26 MMOL/L — SIGNIFICANT CHANGE UP (ref 22–31)
COLOR SPEC: YELLOW — SIGNIFICANT CHANGE UP
CREAT SERPL-MCNC: 1.1 MG/DL — SIGNIFICANT CHANGE UP (ref 0.5–1.3)
DIFF PNL FLD: ABNORMAL
EGFR: 51 ML/MIN/1.73M2 — LOW
EOSINOPHIL # BLD AUTO: 0.11 K/UL — SIGNIFICANT CHANGE UP (ref 0–0.5)
EOSINOPHIL NFR BLD AUTO: 1.1 % — SIGNIFICANT CHANGE UP (ref 0–6)
EPI CELLS # UR: PRESENT
GLUCOSE SERPL-MCNC: 138 MG/DL — HIGH (ref 70–99)
GLUCOSE UR QL: NEGATIVE MG/DL — SIGNIFICANT CHANGE UP
HCT VFR BLD CALC: 39.6 % — SIGNIFICANT CHANGE UP (ref 34.5–45)
HGB BLD-MCNC: 12.3 G/DL — SIGNIFICANT CHANGE UP (ref 11.5–15.5)
IMM GRANULOCYTES NFR BLD AUTO: 0.4 % — SIGNIFICANT CHANGE UP (ref 0–0.9)
INR BLD: 0.87 RATIO — SIGNIFICANT CHANGE UP (ref 0.85–1.18)
KETONES UR-MCNC: NEGATIVE MG/DL — SIGNIFICANT CHANGE UP
LACTATE SERPL-SCNC: 1.4 MMOL/L — SIGNIFICANT CHANGE UP (ref 0.7–2)
LEUKOCYTE ESTERASE UR-ACNC: NEGATIVE — SIGNIFICANT CHANGE UP
LIDOCAIN IGE QN: 32 U/L — SIGNIFICANT CHANGE UP (ref 13–75)
LYMPHOCYTES # BLD AUTO: 1.27 K/UL — SIGNIFICANT CHANGE UP (ref 1–3.3)
LYMPHOCYTES # BLD AUTO: 12.5 % — LOW (ref 13–44)
MANUAL SMEAR VERIFICATION: SIGNIFICANT CHANGE UP
MCHC RBC-ENTMCNC: 21.5 PG — LOW (ref 27–34)
MCHC RBC-ENTMCNC: 31.1 GM/DL — LOW (ref 32–36)
MCV RBC AUTO: 69.4 FL — LOW (ref 80–100)
MICROCYTES BLD QL: SIGNIFICANT CHANGE UP
MONOCYTES # BLD AUTO: 1.11 K/UL — HIGH (ref 0–0.9)
MONOCYTES NFR BLD AUTO: 10.9 % — SIGNIFICANT CHANGE UP (ref 2–14)
NEUTROPHILS # BLD AUTO: 7.58 K/UL — HIGH (ref 1.8–7.4)
NEUTROPHILS NFR BLD AUTO: 74.3 % — SIGNIFICANT CHANGE UP (ref 43–77)
NITRITE UR-MCNC: NEGATIVE — SIGNIFICANT CHANGE UP
NRBC # BLD: 0 /100 WBCS — SIGNIFICANT CHANGE UP (ref 0–0)
OVALOCYTES BLD QL SMEAR: SLIGHT — SIGNIFICANT CHANGE UP
PH UR: 7.5 — SIGNIFICANT CHANGE UP (ref 5–8)
PLAT MORPH BLD: NORMAL — SIGNIFICANT CHANGE UP
PLATELET # BLD AUTO: 189 K/UL — SIGNIFICANT CHANGE UP (ref 150–400)
PLATELET COUNT - ESTIMATE: NORMAL — SIGNIFICANT CHANGE UP
POIKILOCYTOSIS BLD QL AUTO: SLIGHT — SIGNIFICANT CHANGE UP
POTASSIUM SERPL-MCNC: 3.4 MMOL/L — LOW (ref 3.5–5.3)
POTASSIUM SERPL-SCNC: 3.4 MMOL/L — LOW (ref 3.5–5.3)
PROT SERPL-MCNC: 7.8 G/DL — SIGNIFICANT CHANGE UP (ref 6–8.3)
PROT UR-MCNC: NEGATIVE MG/DL — SIGNIFICANT CHANGE UP
PROTHROM AB SERPL-ACNC: 10.2 SEC — SIGNIFICANT CHANGE UP (ref 9.5–13)
RBC # BLD: 5.71 M/UL — HIGH (ref 3.8–5.2)
RBC # FLD: 17.1 % — HIGH (ref 10.3–14.5)
RBC BLD AUTO: ABNORMAL
RBC CASTS # UR COMP ASSIST: 5 /HPF — HIGH (ref 0–4)
SODIUM SERPL-SCNC: 143 MMOL/L — SIGNIFICANT CHANGE UP (ref 135–145)
SP GR SPEC: 1.04 — HIGH (ref 1–1.03)
UROBILINOGEN FLD QL: 1 MG/DL — SIGNIFICANT CHANGE UP (ref 0.2–1)
WBC # BLD: 10.19 K/UL — SIGNIFICANT CHANGE UP (ref 3.8–10.5)
WBC # FLD AUTO: 10.19 K/UL — SIGNIFICANT CHANGE UP (ref 3.8–10.5)
WBC UR QL: 3 /HPF — SIGNIFICANT CHANGE UP (ref 0–5)

## 2024-06-22 PROCEDURE — 80053 COMPREHEN METABOLIC PANEL: CPT

## 2024-06-22 PROCEDURE — 74177 CT ABD & PELVIS W/CONTRAST: CPT | Mod: 26,MC

## 2024-06-22 PROCEDURE — 36415 COLL VENOUS BLD VENIPUNCTURE: CPT

## 2024-06-22 PROCEDURE — 85025 COMPLETE CBC W/AUTO DIFF WBC: CPT

## 2024-06-22 PROCEDURE — 71046 X-RAY EXAM CHEST 2 VIEWS: CPT | Mod: 26

## 2024-06-22 PROCEDURE — 76705 ECHO EXAM OF ABDOMEN: CPT

## 2024-06-22 PROCEDURE — 99284 EMERGENCY DEPT VISIT MOD MDM: CPT | Mod: 25

## 2024-06-22 PROCEDURE — 74177 CT ABD & PELVIS W/CONTRAST: CPT | Mod: MC

## 2024-06-22 PROCEDURE — 96365 THER/PROPH/DIAG IV INF INIT: CPT | Mod: XU

## 2024-06-22 PROCEDURE — 71046 X-RAY EXAM CHEST 2 VIEWS: CPT

## 2024-06-22 PROCEDURE — 83605 ASSAY OF LACTIC ACID: CPT

## 2024-06-22 PROCEDURE — 81001 URINALYSIS AUTO W/SCOPE: CPT

## 2024-06-22 PROCEDURE — 85730 THROMBOPLASTIN TIME PARTIAL: CPT

## 2024-06-22 PROCEDURE — 85610 PROTHROMBIN TIME: CPT

## 2024-06-22 PROCEDURE — 83690 ASSAY OF LIPASE: CPT

## 2024-06-22 PROCEDURE — 76705 ECHO EXAM OF ABDOMEN: CPT | Mod: 26

## 2024-06-22 RX ADMIN — Medication 400 MILLIGRAM(S): at 00:22

## 2024-06-22 RX ADMIN — SODIUM CHLORIDE 1000 MILLILITER(S): 9 INJECTION INTRAMUSCULAR; INTRAVENOUS; SUBCUTANEOUS at 01:22

## 2024-06-22 RX ADMIN — Medication 1000 MILLIGRAM(S): at 01:22

## 2024-06-22 RX ADMIN — SODIUM CHLORIDE 1000 MILLILITER(S): 9 INJECTION INTRAMUSCULAR; INTRAVENOUS; SUBCUTANEOUS at 00:23

## 2024-06-22 NOTE — ED ADULT NURSE NOTE - OBJECTIVE STATEMENT
Pt came in per day nurse complaining of right hip pain 6/10 non radiating Pt denied injury to area. No swelling or deformity

## 2024-06-22 NOTE — ED ADULT NURSE REASSESSMENT NOTE - NS ED NURSE REASSESS COMMENT FT1
PAtient brought from CT scan to room 5a assisted back to bed call bell provided reminded with call bell use.
Discharged with Urology consult and symptom management

## 2024-06-25 ENCOUNTER — APPOINTMENT (OUTPATIENT)
Dept: SURGERY | Facility: CLINIC | Age: 79
End: 2024-06-25
Payer: MEDICARE

## 2024-06-25 VITALS
TEMPERATURE: 98.8 F | HEART RATE: 63 BPM | WEIGHT: 131 LBS | SYSTOLIC BLOOD PRESSURE: 136 MMHG | DIASTOLIC BLOOD PRESSURE: 71 MMHG | BODY MASS INDEX: 24.73 KG/M2 | HEIGHT: 61 IN | OXYGEN SATURATION: 99 %

## 2024-06-25 DIAGNOSIS — N28.1 CYST OF KIDNEY, ACQUIRED: ICD-10-CM

## 2024-06-25 DIAGNOSIS — R19.09 OTHER INTRA-ABDOMINAL AND PELVIC SWELLING, MASS AND LUMP: ICD-10-CM

## 2024-06-25 PROCEDURE — 99213 OFFICE O/P EST LOW 20 MIN: CPT

## 2024-06-28 PROBLEM — N28.1 RENAL CYST, RIGHT: Status: ACTIVE | Noted: 2019-01-14

## 2024-06-28 PROBLEM — R19.09 RIGHT GROIN MASS: Status: RESOLVED | Noted: 2024-05-03 | Resolved: 2024-06-28

## 2024-07-02 ENCOUNTER — LABORATORY RESULT (OUTPATIENT)
Age: 79
End: 2024-07-02

## 2024-07-02 ENCOUNTER — APPOINTMENT (OUTPATIENT)
Dept: UROLOGY | Facility: CLINIC | Age: 79
End: 2024-07-02
Payer: MEDICARE

## 2024-07-02 DIAGNOSIS — N20.0 CALCULUS OF KIDNEY: ICD-10-CM

## 2024-07-02 PROCEDURE — 51798 US URINE CAPACITY MEASURE: CPT

## 2024-07-02 PROCEDURE — 99204 OFFICE O/P NEW MOD 45 MIN: CPT

## 2024-07-11 LAB
APPEARANCE: CLEAR
BILIRUBIN URINE: NEGATIVE
BLOOD URINE: NEGATIVE
COLOR: YELLOW
GLUCOSE QUALITATIVE U: NEGATIVE MG/DL
KETONES URINE: NEGATIVE MG/DL
LEUKOCYTE ESTERASE URINE: ABNORMAL
NITRITE URINE: NEGATIVE
PH URINE: 6
PROTEIN URINE: 30 MG/DL
SPECIFIC GRAVITY URINE: 1.02
URINE CYTOLOGY: NORMAL
UROBILINOGEN URINE: 0.2 MG/DL

## 2024-08-09 ENCOUNTER — EMERGENCY (EMERGENCY)
Facility: HOSPITAL | Age: 79
LOS: 1 days | Discharge: HOME | End: 2024-08-09
Attending: EMERGENCY MEDICINE | Admitting: EMERGENCY MEDICINE
Payer: COMMERCIAL

## 2024-08-09 VITALS
SYSTOLIC BLOOD PRESSURE: 108 MMHG | RESPIRATION RATE: 18 BRPM | OXYGEN SATURATION: 98 % | HEART RATE: 70 BPM | DIASTOLIC BLOOD PRESSURE: 63 MMHG

## 2024-08-09 VITALS
DIASTOLIC BLOOD PRESSURE: 60 MMHG | RESPIRATION RATE: 16 BRPM | HEIGHT: 60 IN | HEART RATE: 63 BPM | OXYGEN SATURATION: 96 % | TEMPERATURE: 100 F | SYSTOLIC BLOOD PRESSURE: 115 MMHG | WEIGHT: 130.07 LBS

## 2024-08-09 LAB
ALBUMIN SERPL ELPH-MCNC: 3.5 G/DL — SIGNIFICANT CHANGE UP (ref 3.3–5)
ALP SERPL-CCNC: 65 U/L — SIGNIFICANT CHANGE UP (ref 40–120)
ALT FLD-CCNC: 18 U/L — SIGNIFICANT CHANGE UP (ref 12–78)
ANION GAP SERPL CALC-SCNC: 5 MMOL/L — SIGNIFICANT CHANGE UP (ref 5–17)
APTT BLD: 28.4 SEC — SIGNIFICANT CHANGE UP (ref 24.5–35.6)
AST SERPL-CCNC: 12 U/L — LOW (ref 15–37)
BASOPHILS # BLD AUTO: 0.06 K/UL — SIGNIFICANT CHANGE UP (ref 0–0.2)
BASOPHILS NFR BLD AUTO: 0.9 % — SIGNIFICANT CHANGE UP (ref 0–2)
BILIRUB SERPL-MCNC: 0.7 MG/DL — SIGNIFICANT CHANGE UP (ref 0.2–1.2)
BUN SERPL-MCNC: 20 MG/DL — SIGNIFICANT CHANGE UP (ref 7–23)
CALCIUM SERPL-MCNC: 9 MG/DL — SIGNIFICANT CHANGE UP (ref 8.5–10.1)
CHLORIDE SERPL-SCNC: 109 MMOL/L — HIGH (ref 96–108)
CK SERPL-CCNC: 102 U/L — SIGNIFICANT CHANGE UP (ref 26–192)
CO2 SERPL-SCNC: 30 MMOL/L — SIGNIFICANT CHANGE UP (ref 22–31)
CREAT SERPL-MCNC: 0.69 MG/DL — SIGNIFICANT CHANGE UP (ref 0.5–1.3)
EGFR: 88 ML/MIN/1.73M2 — SIGNIFICANT CHANGE UP
EOSINOPHIL # BLD AUTO: 0.15 K/UL — SIGNIFICANT CHANGE UP (ref 0–0.5)
EOSINOPHIL NFR BLD AUTO: 2.4 % — SIGNIFICANT CHANGE UP (ref 0–6)
GLUCOSE SERPL-MCNC: 94 MG/DL — SIGNIFICANT CHANGE UP (ref 70–99)
HCT VFR BLD CALC: 36.4 % — SIGNIFICANT CHANGE UP (ref 34.5–45)
HGB BLD-MCNC: 11.2 G/DL — LOW (ref 11.5–15.5)
IMM GRANULOCYTES NFR BLD AUTO: 0.3 % — SIGNIFICANT CHANGE UP (ref 0–0.9)
INR BLD: 1.01 RATIO — SIGNIFICANT CHANGE UP (ref 0.85–1.18)
LIDOCAIN IGE QN: 33 U/L — SIGNIFICANT CHANGE UP (ref 13–75)
LYMPHOCYTES # BLD AUTO: 1.32 K/UL — SIGNIFICANT CHANGE UP (ref 1–3.3)
LYMPHOCYTES # BLD AUTO: 20.9 % — SIGNIFICANT CHANGE UP (ref 13–44)
MAGNESIUM SERPL-MCNC: 2.3 MG/DL — SIGNIFICANT CHANGE UP (ref 1.6–2.6)
MCHC RBC-ENTMCNC: 21.3 PG — LOW (ref 27–34)
MCHC RBC-ENTMCNC: 30.8 GM/DL — LOW (ref 32–36)
MCV RBC AUTO: 69.2 FL — LOW (ref 80–100)
MONOCYTES # BLD AUTO: 0.65 K/UL — SIGNIFICANT CHANGE UP (ref 0–0.9)
MONOCYTES NFR BLD AUTO: 10.3 % — SIGNIFICANT CHANGE UP (ref 2–14)
NEUTROPHILS # BLD AUTO: 4.13 K/UL — SIGNIFICANT CHANGE UP (ref 1.8–7.4)
NEUTROPHILS NFR BLD AUTO: 65.2 % — SIGNIFICANT CHANGE UP (ref 43–77)
NRBC # BLD: 0 /100 WBCS — SIGNIFICANT CHANGE UP (ref 0–0)
PLATELET # BLD AUTO: 208 K/UL — SIGNIFICANT CHANGE UP (ref 150–400)
POTASSIUM SERPL-MCNC: 3.7 MMOL/L — SIGNIFICANT CHANGE UP (ref 3.5–5.3)
POTASSIUM SERPL-SCNC: 3.7 MMOL/L — SIGNIFICANT CHANGE UP (ref 3.5–5.3)
PROT SERPL-MCNC: 6.5 G/DL — SIGNIFICANT CHANGE UP (ref 6–8.3)
PROTHROM AB SERPL-ACNC: 11.5 SEC — SIGNIFICANT CHANGE UP (ref 9.5–13)
RBC # BLD: 5.26 M/UL — HIGH (ref 3.8–5.2)
RBC # FLD: 15.1 % — HIGH (ref 10.3–14.5)
SODIUM SERPL-SCNC: 144 MMOL/L — SIGNIFICANT CHANGE UP (ref 135–145)
TROPONIN I, HIGH SENSITIVITY RESULT: 7.7 NG/L — SIGNIFICANT CHANGE UP
WBC # BLD: 6.33 K/UL — SIGNIFICANT CHANGE UP (ref 3.8–10.5)
WBC # FLD AUTO: 6.33 K/UL — SIGNIFICANT CHANGE UP (ref 3.8–10.5)

## 2024-08-09 PROCEDURE — 85610 PROTHROMBIN TIME: CPT

## 2024-08-09 PROCEDURE — 83735 ASSAY OF MAGNESIUM: CPT

## 2024-08-09 PROCEDURE — 82550 ASSAY OF CK (CPK): CPT

## 2024-08-09 PROCEDURE — 85025 COMPLETE CBC W/AUTO DIFF WBC: CPT

## 2024-08-09 PROCEDURE — 83690 ASSAY OF LIPASE: CPT

## 2024-08-09 PROCEDURE — 85730 THROMBOPLASTIN TIME PARTIAL: CPT

## 2024-08-09 PROCEDURE — 80053 COMPREHEN METABOLIC PANEL: CPT

## 2024-08-09 PROCEDURE — 36415 COLL VENOUS BLD VENIPUNCTURE: CPT

## 2024-08-09 PROCEDURE — 99285 EMERGENCY DEPT VISIT HI MDM: CPT

## 2024-08-09 PROCEDURE — 93010 ELECTROCARDIOGRAM REPORT: CPT

## 2024-08-09 PROCEDURE — 99284 EMERGENCY DEPT VISIT MOD MDM: CPT | Mod: 25

## 2024-08-09 PROCEDURE — 93005 ELECTROCARDIOGRAM TRACING: CPT

## 2024-08-09 PROCEDURE — 84484 ASSAY OF TROPONIN QUANT: CPT

## 2024-08-09 NOTE — ED PROVIDER NOTE - CLINICAL SUMMARY MEDICAL DECISION MAKING FREE TEXT BOX
Patient is a 79-year-old female who presents to the emergency room with concerns for possible hypokalemia.  Past medical history of hypertension hyperlipidemia CAD status post triple-vessel CABG in 2020 history of PE hypothyroidism h/o colon cancer.  She presents to the emergency room after she was notified that her potassium was critically low.  Patient reports that she has been experiencing palpitation and shortness of breath on exertion but that she has had this for some time.  Patient follow-up with primary care for this.  Blood work was done outpatient and she was called today and told her potassium was critically low. Patient presenting to the emergency room with reported hypokalemia palpitations and dyspnea on exertion.  Differential is broad includes possible electrolyte abnormality versus cardiac pathology.  Will obtain screening labs check potassium magnesium levels.  Will obtain cardiac enzymes EKG and consult with cardiology.  Ultimate clinical disposition will be pending results.  Labs reviewed patient with a normal white count chloride noted calcium 3.7 magnesium within normal.  Independent review of EKG reveals sinus bradycardia with short ND at a rate of 59 bpm.  Patient seen and cleared by cardiology.

## 2024-08-09 NOTE — ED ADULT NURSE REASSESSMENT NOTE - NS ED NURSE REASSESS COMMENT FT1
Covering RN: Pt A&Ox4. Pt denies any chest pain t this time. Cardio at bedside. Pt resting in stretcher with  at bedside.

## 2024-08-09 NOTE — ED PROVIDER NOTE - CARE PROVIDER_API CALL
PROVIDER:[TOKEN:[[id number]]]
Your Cardiologist,   Phone: (   )    -  Fax: (   )    -  Follow Up Time:

## 2024-08-09 NOTE — ED ADULT NURSE NOTE - OBJECTIVE STATEMENT
States decreased K+ on outpt bld work.  States palpitations.  Pt alert.  Slightly confused to date.  HR WNL.  On monitor.

## 2024-08-09 NOTE — ED ADULT NURSE NOTE - NSFALLUNIVINTERV_ED_ALL_ED
Bed/Stretcher in lowest position, wheels locked, appropriate side rails in place/Call bell, personal items and telephone in reach/Instruct patient to call for assistance before getting out of bed/chair/stretcher/Non-slip footwear applied when patient is off stretcher/[place] to call system/Physically safe environment - no spills, clutter or unnecessary equipment/Purposeful proactive rounding/Room/bathroom lighting operational, light cord in reach

## 2024-08-09 NOTE — CONSULT NOTE ADULT - ATTENDING COMMENTS
79yF pmhx of HTN, HLD, CAD s/p CABG (3v 11/2020) hx of PE, hyperthyroidism comes to the ED for abnormal lab result.  Initially presented for hypoK which was normal on labs in ed  though c/o palp and chest pain with ambulation up stairs. this is chronic. had angio done with dr. AMBER Shaw and did not need intervention. likely has chronic angina from small vessel disease. palps likely st 2/2 deconditioning. advised to get outpt monitoring.   EKG nonischemic No clear evidence of acute ischemia Troponin negative, CK negative Continue home meds: Toprol , amlodipine 10, valsartan-hctz 160-12.5  FU with dr AMBER Shaw as outpt

## 2024-08-09 NOTE — ED PROVIDER NOTE - NSFOLLOWUPINSTRUCTIONS_ED_ALL_ED_FT
Return to the ED for any new or worsening symptoms  Take your medication as prescribed  Follow up with your cardiologist   Advance activity as tolerated      Palpitations  Palpitations are feelings that your heartbeat is not normal. Your heartbeat may feel like it is:  Uneven (irregular).  Faster than normal.  Fluttering.  Skipping a beat.  This is usually not a serious problem. However, a doctor will do tests and check your medical history to make sure that you do not have a serious heart problem.    Follow these instructions at home:  Watch for any changes in your condition. Tell your doctor about any changes. Take these actions to help manage your symptoms:    Eating and drinking    Follow instructions from your doctor about things to eat and drink. You may be told to avoid these things:  Drinks that have caffeine in them, such as coffee, tea, soft drinks, and energy drinks.  Chocolate.  Alcohol.  Diet pills.  Lifestyle    A person sitting on the floor doing yoga.  A sign telling the reader not to smoke.  Try to lower your stress. These things can help you relax:  Yoga.  Deep breathing and meditation.  Guided imagery. This is using words and images to create positive thoughts.  Exercise, including swimming, jogging, and walking. Tell your doctor if you have more abnormal heartbeats when you are active. If you have chest pain or feel short of breath with exercise, do not keep doing the exercise until you are seen by your doctor.  Biofeedback. This is using your mind to control things in your body, such as your heartbeat.  Get plenty of rest and sleep. Keep a regular bed time.  Do not use drugs, such as cocaine or ecstasy. Do not use marijuana.  Do not smoke or use any products that contain nicotine or tobacco. If you need help quitting, ask your doctor.  General instructions    Take over-the-counter and prescription medicines only as told by your doctor.  Keep all follow-up visits. You may need more tests if palpitations do not go away or get worse.  Contact a doctor if:  You keep having fast or uneven heartbeats for a long time.  Your symptoms happen more often.  Get help right away if:  You have chest pain.  You feel short of breath.  You have a very bad headache.  You feel dizzy.  You faint.  These symptoms may be an emergency. Get help right away. Call your local emergency services (911 in the [place]  Do not wait to see if the symptoms will go away.  Do not drive yourself to the hospital.  Summary  Palpitations are feelings that your heartbeat is uneven or faster than normal. It may feel like your heart is fluttering or skipping a beat.  Avoid food and drinks that may cause this condition. These include caffeine, chocolate, and alcohol.  Try to lower your stress. Do not smoke or use drugs.  Get help right away if you faint, feel dizzy, feel short of breath, have chest pain, or have a very bad headache.  This information is not intended to replace advice given to you by your health care provider. Make sure you discuss any questions you have with your health care provider.

## 2024-08-09 NOTE — ED PROVIDER NOTE - PATIENT PORTAL LINK FT
You can access the FollowMyHealth Patient Portal offered by [facility] by registering at the following website: [email or web address]. By joining [facility]’s FollowMyHealth portal, you will also be able to view your health information using other applications (apps) compatible with our system.

## 2024-08-09 NOTE — ED PROVIDER NOTE - OBJECTIVE STATEMENT
Patient is a 79-year-old female who presents to the emergency room with concerns for possible hypokalemia.  Past medical history of hypertension hyperlipidemia CAD status post triple-vessel CABG in 2020 history of PE hypothyroidism h/o colon cancer.  She presents to the emergency room after she was notified that her potassium was critically low.  Patient reports that she has been experiencing palpitation and shortness of breath on exertion but that she has had this for some time.  Patient follow-up with primary care for this.  Blood work was done outpatient and she was called today and told her potassium was critically low.

## 2024-08-09 NOTE — ED PROVIDER NOTE - DIFFERENTIAL DIAGNOSIS
Patient presenting to the emergency room with reported hypokalemia palpitations and dyspnea on exertion.  Differential is broad includes possible electrolyte abnormality versus cardiac pathology.  Will obtain screening labs check potassium magnesium levels.  Will obtain cardiac enzymes EKG and consult with cardiology.  Ultimate clinical disposition will be pending results. Differential Diagnosis

## 2024-08-09 NOTE — ED ADULT TRIAGE NOTE - ARRIVAL FROM
Is This A New Presentation, Or A Follow-Up?: Skin Lesions
Pt admitted to Trg 5. Pt states she came to the hospital due to left sided shoulder pain. States that she sleeps on that side all the time, as MD advised it was best for the baby to lay on her left side.   Pt states she also has intermittent periods where
Have Your Skin Lesions Been Treated?: not been treated
Home

## 2024-08-09 NOTE — CONSULT NOTE ADULT - ASSESSMENT
79yF pmhx of HTN, HLD, CAD s/p CABG (3v 11/2020) hx of PE, hyperthyroidism comes to the ED for abnormal lab result.    #Ischemia   - No clear evidence of acute ischemia  - Troponin negative, CK negative  - EKG: sinus jermain  - Hx of CAD not taking any blood thinner    #Arrhythmia  - EKG: sinus, unchanged from prior EKG  - Monitor and replete lytes, keep K>4, Mg>2.    #HTN  - BP stable currently  - Continue home meds: Toprol , amlodipine 10, valsartan-hctz 160-12.5  - Continue to monitor hemodynamics     - Patient stable for d/c from cardiac standpoint

## 2024-08-18 ENCOUNTER — NON-APPOINTMENT (OUTPATIENT)
Age: 79
End: 2024-08-18

## 2024-08-19 ENCOUNTER — NON-APPOINTMENT (OUTPATIENT)
Age: 79
End: 2024-08-19

## 2024-08-19 ENCOUNTER — APPOINTMENT (OUTPATIENT)
Dept: CARDIOLOGY | Facility: CLINIC | Age: 79
End: 2024-08-19
Payer: MEDICARE

## 2024-08-19 VITALS
SYSTOLIC BLOOD PRESSURE: 132 MMHG | HEIGHT: 61 IN | WEIGHT: 128 LBS | BODY MASS INDEX: 24.17 KG/M2 | DIASTOLIC BLOOD PRESSURE: 60 MMHG | HEART RATE: 69 BPM | OXYGEN SATURATION: 98 %

## 2024-08-19 DIAGNOSIS — R00.2 PALPITATIONS: ICD-10-CM

## 2024-08-19 DIAGNOSIS — R94.31 ABNORMAL ELECTROCARDIOGRAM [ECG] [EKG]: ICD-10-CM

## 2024-08-19 DIAGNOSIS — R06.00 DYSPNEA, UNSPECIFIED: ICD-10-CM

## 2024-08-19 DIAGNOSIS — R09.89 OTHER SPECIFIED SYMPTOMS AND SIGNS INVOLVING THE CIRCULATORY AND RESPIRATORY SYSTEMS: ICD-10-CM

## 2024-08-19 DIAGNOSIS — I25.10 ATHEROSCLEROTIC HEART DISEASE OF NATIVE CORONARY ARTERY W/OUT ANGINA PECTORIS: ICD-10-CM

## 2024-08-19 DIAGNOSIS — Z95.1 PRESENCE OF AORTOCORONARY BYPASS GRAFT: ICD-10-CM

## 2024-08-19 PROCEDURE — 99214 OFFICE O/P EST MOD 30 MIN: CPT | Mod: 25

## 2024-08-19 PROCEDURE — 93000 ELECTROCARDIOGRAM COMPLETE: CPT | Mod: 59

## 2024-08-19 PROCEDURE — 93244 EXT ECG>48HR<7D REV&INTERPJ: CPT

## 2024-08-19 PROCEDURE — 93242 EXT ECG>48HR<7D RECORDING: CPT

## 2024-08-19 RX ORDER — METHIMAZOLE 5 MG/1
5 TABLET ORAL
Refills: 0 | Status: ACTIVE | COMMUNITY

## 2024-08-19 NOTE — DISCUSSION/SUMMARY
[FreeTextEntry1] : Carotid duplex. Contin current meds. Advised to take aspirin 81 mg QD and to restart statin. 3-day cardiac monitor Echo Reassurance. Neuro eval pending.  [EKG obtained to assist in diagnosis and management of assessed problem(s)] : EKG obtained to assist in diagnosis and management of assessed problem(s)

## 2024-08-19 NOTE — HISTORY OF PRESENT ILLNESS
[FreeTextEntry1] : ARMANI HOSKINS is a 79 year old female self referred acc by . She has memory impairment. Compl of chronic but 2 mo of worsened palpitations with exertion.  + assoc with MAY. No CP. Was hosp at [facility] recently and had cardiac cath done report reviewed.  + Atretic LIMA-LAD. She has been off statin acc to  because of concern about cognitive decline.  Also stopped aspirin a long time ago because she thought was not necessary.

## 2024-08-19 NOTE — PHYSICAL EXAM
[Well Developed] : well developed [Well Nourished] : well nourished [No Acute Distress] : no acute distress [Normal Conjunctiva] : normal conjunctiva [Normal Venous Pressure] : normal venous pressure [Carotid Bruit] : carotid bruit [Normal S1, S2] : normal S1, S2 [No Murmur] : no murmur [No Rub] : no rub [No Gallop] : no gallop [Clear Lung Fields] : clear lung fields [Good Air Entry] : good air entry [No Respiratory Distress] : no respiratory distress  [Soft] : abdomen soft [Non Tender] : non-tender [No Masses/organomegaly] : no masses/organomegaly [Normal Bowel Sounds] : normal bowel sounds [Normal Gait] : normal gait [No Edema] : no edema [No Cyanosis] : no cyanosis [No Clubbing] : no clubbing [No Varicosities] : no varicosities [No Rash] : no rash [No Skin Lesions] : no skin lesions [Moves all extremities] : moves all extremities [No Focal Deficits] : no focal deficits [Normal Speech] : normal speech [Alert and Oriented] : alert and oriented [Normal memory] : normal memory [de-identified] : + right

## 2024-09-09 ENCOUNTER — APPOINTMENT (OUTPATIENT)
Dept: CARDIOLOGY | Facility: CLINIC | Age: 79
End: 2024-09-09
Payer: MEDICARE

## 2024-09-09 VITALS
WEIGHT: 127 LBS | DIASTOLIC BLOOD PRESSURE: 60 MMHG | HEART RATE: 54 BPM | HEIGHT: 61 IN | BODY MASS INDEX: 23.98 KG/M2 | SYSTOLIC BLOOD PRESSURE: 136 MMHG | OXYGEN SATURATION: 97 %

## 2024-09-09 DIAGNOSIS — I47.10 SUPRAVENTRICULAR TACHYCARDIA, UNSPECIFIED: ICD-10-CM

## 2024-09-09 DIAGNOSIS — Z95.1 PRESENCE OF AORTOCORONARY BYPASS GRAFT: ICD-10-CM

## 2024-09-09 DIAGNOSIS — R00.2 PALPITATIONS: ICD-10-CM

## 2024-09-09 PROCEDURE — 99214 OFFICE O/P EST MOD 30 MIN: CPT

## 2024-09-09 PROCEDURE — 93306 TTE W/DOPPLER COMPLETE: CPT

## 2024-09-09 RX ORDER — ROSUVASTATIN CALCIUM 5 MG/1
5 TABLET, FILM COATED ORAL
Refills: 0 | Status: ACTIVE | COMMUNITY
Start: 2024-09-09

## 2024-09-09 NOTE — DISCUSSION/SUMMARY
[FreeTextEntry1] : Change metoprolol to 50 mg AM and 25 mg evening in effort to better control palps during the day.  Other meds same. Reassurance Follow up 3 mo

## 2024-09-09 NOTE — HISTORY OF PRESENT ILLNESS
[FreeTextEntry1] : Here for follow up and review monitor which showed freq brief SVT She still compl of palps particularly with exertion. She has been taking metoprolol ER 75 mg at night. Echo prelim shows normal LV function

## 2024-09-17 ENCOUNTER — APPOINTMENT (OUTPATIENT)
Dept: DERMATOLOGY | Facility: CLINIC | Age: 79
End: 2024-09-17

## 2024-09-17 VITALS — HEIGHT: 60 IN | BODY MASS INDEX: 24.35 KG/M2 | WEIGHT: 124 LBS

## 2024-09-17 DIAGNOSIS — D48.5 NEOPLASM OF UNCERTAIN BEHAVIOR OF SKIN: ICD-10-CM

## 2024-09-17 DIAGNOSIS — Z12.83 ENCOUNTER FOR SCREENING FOR MALIGNANT NEOPLASM OF SKIN: ICD-10-CM

## 2024-09-17 DIAGNOSIS — D22.9 MELANOCYTIC NEVI, UNSPECIFIED: ICD-10-CM

## 2024-09-17 DIAGNOSIS — L82.0 INFLAMED SEBORRHEIC KERATOSIS: ICD-10-CM

## 2024-09-17 PROCEDURE — 11102 TANGNTL BX SKIN SINGLE LES: CPT | Mod: 59

## 2024-09-17 PROCEDURE — 17110 DESTRUCTION B9 LES UP TO 14: CPT | Mod: 59

## 2024-09-17 PROCEDURE — 99203 OFFICE O/P NEW LOW 30 MIN: CPT | Mod: 25

## 2024-09-17 NOTE — PHYSICAL EXAM
[Alert] : alert [Oriented x 3] : ~L oriented x 3 [FreeTextEntry3] : Full body skin exam was performed. The patient is well-appearing, in no acute distress, alert and oriented x 3. Mood and affect are normal. A complete cutaneous examination of the scalp, face, neck, chest, abdomen, back, bilateral arms, bilateral legs, buttocks, digits, nails, eyelids, conjunctiva and lips reveals the following significant findings:  - Numerous 5-10mm flat round brown waxy papules scattered over body - Scattered 1-3 mm round bright red monomorphous papules over trunk and extremities - Several 2-5 mm tan to dark brown well demarcated round to oval macules - 5mm skin colored pedunculated fleshy papule on lower abd  Genital exam declined advised to perform self exams and alert us if any unusual or changing moles

## 2024-09-17 NOTE — HISTORY OF PRESENT ILLNESS
[FreeTextEntry1] : NP: spots on skin [de-identified] : ARMANI HOSKINS is a 79 year who is presenting for evaluation of:   #Spots on skin Spots scattered on body x years. Asymptomatic and unchanged. No alleviating/aggravating factors. Never been treated. No other changing or concerning lesions.  No itchy, growing, bleeding, painful or changing moles. Requesting TBSE.   Skin cancer hx: none Family hx of skin cancer: none

## 2024-09-17 NOTE — ASSESSMENT
[FreeTextEntry1] : #Seborrheic Keratosis #Lentigines  #Cherry angioma #Multiple melanocytic nevi, benign  Screening exam for skin cancer - benign findings as above  - TBSE performed today - Advised sun protection.  Recommended OTC sunscreen products, including SPF30+ with broadband UV protection as well as proper use.  Discussed OTC sun protective clothing - Counseled patient to monitor for changes, including ABCDEs of mole monitoring - Discussed self-skin exams. Counseled patient to monitor for changes, including mole monitoring and self-skin exams  #Neoplasm of uncertain behavior x1, lower abd - Rule out acrochordon - Recommend tangential shave biopsy for definitive diagnosis. - After informed consent was obtained, using Hibiclens for cleansing and 1% Lidocaine with epinephrine for anesthetic, with sterile technique a shave biopsy was used to obtain a biopsy specimen of the lesion. Hemostasis was obtained with aluminum chloride. Vaseline was applied, and wound care instructions provided. The specimen was labeled and sent to pathology for evaluation. The procedure was well tolerated without complication. - The patient will be contacted with biopsy results  #Inflamed Seborrheic Keratosis - x1, L breast - Given inflammatory nature of lesions above, will treat with cryosurgery - Patient was verbally consented. Lesions treated with liquid nitrogen f/t/f x2 cycles. Patient tolerated it well. - Side effects include blister formation, hypopigmentation, and scarring - Wound care with vaseline  #Seborrheic Keratosis - These growths are benign - Referred to Dr. Chirinos for cosmetic consult  RTC in 1 year for FBSE

## 2024-09-17 NOTE — HISTORY OF PRESENT ILLNESS
[FreeTextEntry1] : NP: spots on skin [de-identified] : ARMANI HOSKINS is a 79 year who is presenting for evaluation of:   #Spots on skin Spots scattered on body x years. Asymptomatic and unchanged. No alleviating/aggravating factors. Never been treated. No other changing or concerning lesions.  No itchy, growing, bleeding, painful or changing moles. Requesting TBSE.   Skin cancer hx: none Family hx of skin cancer: none

## 2024-09-25 LAB — DERMATOLOGY BIOPSY: NORMAL

## 2024-11-06 ENCOUNTER — APPOINTMENT (OUTPATIENT)
Dept: NEUROLOGY | Facility: CLINIC | Age: 79
End: 2024-11-06
Payer: MEDICARE

## 2024-11-06 VITALS
HEIGHT: 60 IN | HEART RATE: 67 BPM | SYSTOLIC BLOOD PRESSURE: 133 MMHG | WEIGHT: 130 LBS | DIASTOLIC BLOOD PRESSURE: 70 MMHG | BODY MASS INDEX: 25.52 KG/M2

## 2024-11-06 DIAGNOSIS — R47.01 APHASIA: ICD-10-CM

## 2024-11-06 PROCEDURE — 99204 OFFICE O/P NEW MOD 45 MIN: CPT

## 2024-11-21 ENCOUNTER — APPOINTMENT (OUTPATIENT)
Dept: DERMATOLOGY | Facility: CLINIC | Age: 79
End: 2024-11-21

## 2024-12-02 ENCOUNTER — APPOINTMENT (OUTPATIENT)
Dept: CARDIOLOGY | Facility: CLINIC | Age: 79
End: 2024-12-02

## 2025-02-11 ENCOUNTER — APPOINTMENT (OUTPATIENT)
Dept: SURGERY | Facility: CLINIC | Age: 80
End: 2025-02-11
Payer: MEDICARE

## 2025-02-11 VITALS
BODY MASS INDEX: 25.52 KG/M2 | TEMPERATURE: 97.3 F | SYSTOLIC BLOOD PRESSURE: 139 MMHG | DIASTOLIC BLOOD PRESSURE: 66 MMHG | HEART RATE: 64 BPM | WEIGHT: 130 LBS | OXYGEN SATURATION: 98 % | HEIGHT: 60 IN

## 2025-02-11 DIAGNOSIS — R10.31 RIGHT LOWER QUADRANT PAIN: ICD-10-CM

## 2025-02-11 PROCEDURE — 99213 OFFICE O/P EST LOW 20 MIN: CPT

## 2025-06-29 ENCOUNTER — EMERGENCY (EMERGENCY)
Facility: HOSPITAL | Age: 80
LOS: 1 days | End: 2025-06-29
Attending: EMERGENCY MEDICINE | Admitting: EMERGENCY MEDICINE
Payer: COMMERCIAL

## 2025-06-29 VITALS
SYSTOLIC BLOOD PRESSURE: 123 MMHG | DIASTOLIC BLOOD PRESSURE: 69 MMHG | HEART RATE: 75 BPM | TEMPERATURE: 98 F | RESPIRATION RATE: 18 BRPM | WEIGHT: 119.05 LBS | OXYGEN SATURATION: 97 %

## 2025-06-29 PROCEDURE — 99283 EMERGENCY DEPT VISIT LOW MDM: CPT

## 2025-06-29 PROCEDURE — 99284 EMERGENCY DEPT VISIT MOD MDM: CPT

## 2025-06-29 RX ORDER — ACETAMINOPHEN 500 MG/5ML
1000 LIQUID (ML) ORAL ONCE
Refills: 0 | Status: DISCONTINUED | OUTPATIENT
Start: 2025-06-29 | End: 2025-06-29

## 2025-06-29 NOTE — ED PROVIDER NOTE - ATTENDING APP SHARED VISIT CONTRIBUTION OF CARE
81 yo white female with forehead vs ?? cabinet w/o LOC here for evaluation. Exam revealed white female oriented x one with non focal neuro exam and no evidence of any trauma to forehead. No C-Spine tenderness. Case will require evaluation independently performed. I agree with plan and management outlined by PA.

## 2025-06-29 NOTE — ED PROVIDER NOTE - CLINICAL SUMMARY MEDICAL DECISION MAKING FREE TEXT BOX
79 yo white female with forehead vs ?? cabinet w/o LOC here for evaluation. Exam revealed white female oriented x one with non focal neuro exam and no evidence of any trauma to forehead. No C-Spine tenderness. Case will require evaluation independently performed. I agree with plan and management outlined by PA.

## 2025-06-29 NOTE — ED ADULT NURSE NOTE - NSFALLRISKINTERV_ED_ALL_ED
Assistance OOB with selected safe patient handling equipment if applicable/Assistance with ambulation/Communicate fall risk and risk factors to all staff, patient, and family/Monitor gait and stability/Monitor for mental status changes and reorient to person, place, and time, as needed/Provide visual cue: yellow wristband, yellow gown, etc/Reinforce activity limits and safety measures with patient and family/Toileting schedule using arm’s reach rule for commode and bathroom/Use of alarms - bed, stretcher, chair and/or video monitoring/Call bell, personal items and telephone in reach/Instruct patient to call for assistance before getting out of bed/chair/stretcher/Non-slip footwear applied when patient is off stretcher/[place] to call system/Physically safe environment - no spills, clutter or unnecessary equipment/Purposeful Proactive Rounding/Room/bathroom lighting operational, light cord in reach

## 2025-06-29 NOTE — ED PROVIDER NOTE - OBJECTIVE STATEMENT
80 F hx dementia, on ASA81 BIB  for evaluation. Few days ago had fall, hit head, cut right finger, seen and treated at [facility] including CT scan. Today she bent over and hit her forehead on a chair. No LOC. Acting her usual self.

## 2025-06-29 NOTE — ED PROVIDER NOTE - PATIENT PORTAL LINK FT
You can access the FollowMyHealth Patient Portal offered by [facility] by registering at the following website: [email or web address]. By joining [facility]’s FollowMyHealth portal, you will also be able to view your health information using other applications (apps) compatible with our system.

## 2025-06-29 NOTE — ED PROVIDER NOTE - PROGRESS NOTE DETAILS
states pt upset about being here, worried about their dog. Doesn't feel there was a significant injury and would not want a CT. Understands risks. Will observe her and bring her back if any issue. Low suspicion for significant injury.

## 2025-06-29 NOTE — ED PROVIDER NOTE - NSFOLLOWUPINSTRUCTIONS_ED_ALL_ED_FT
There are many types of head injuries. They can be as minor as a small bump. Some head injuries can be worse.     Worse injuries include:  A strong hit to the head that shakes the brain back and forth, causing damage (concussion).  A bruise (contusion) of the brain. This means there is bleeding in the brain that can cause swelling.  A cracked skull (skull fracture).  Bleeding in the brain that gathers, gets thick (makes a clot), and forms a bump (hematoma).  Most problems from a head injury come in the first 24 hours. However, you may still have side effects up to 7–10 days after your injury. It is important to watch your condition for any changes. You may need to be watched in the emergency department or urgent care, or you may need to stay in the hospital.    What are the causes?  There are many possible causes of a head injury. A serious head injury may be caused by:  A car accident.  Bicycle or motorcycle accidents.  Sports injuries.  Falls.  Being hit by an object.    What are the signs or symptoms?  Symptoms of a head injury include a bruise, bump, or bleeding where the injury happened. Other physical symptoms may include:  Headache.  Feeling like you may vomit (nauseous) or vomiting.  Dizziness.  Blurred or double vision.  Being uncomfortable around bright lights or loud noises.  Feeling tired.  Trouble waking up.  Severe symptoms such as:  Feeling weak or numb on one side of the body.  Slurred speech.  Swallowing problems.  Fainting.  Shaking movements that you cannot control (seizures).  Mental or emotional symptoms may include:  Feeling grumpy or cranky.  Confusion and memory problems.  Having trouble paying attention or concentrating.  Changes in eating or sleeping habits.  Feeling worried or nervous (anxious).  Feeling sad (depressed).    How is this treated?  Treatment for this condition depends on how bad the injury is and the type of injury you have. The main goal is to prevent problems and to allow the brain time to heal.    Mild head injury    If you have a mild head injury, you may be sent home, and treatment may include:  Being watched. A responsible adult should stay with you for 24 hours after your injury and check on you often.  Physical rest.  Brain rest.  Pain medicines.  Very bad head injury    If you have a very bad head injury, treatment may include:  Being watched closely. This includes staying in the hospital.  Medicines to:  Help with pain.  Prevent seizures.  Help with brain swelling.  Protecting your airway and using a machine that helps you breathe (ventilator).  Watching for and manage swelling inside the brain.  Brain surgery. This may be needed to:  Remove a collection of blood or blood clots.  Stop the bleeding.  Remove a part of the skull. This allows room for the brain to swell.    Follow these instructions at home:  Activity    Rest.  Avoid activities that are hard or tiring.  Make sure you get enough sleep.  Let your brain rest. Do fewer activities that need a lot of thought or attention, such as:  Watching TV.  Playing memory games and doing puzzles.  Job-related work or homework.  Working on the computer, social media, and texting.  Avoid activities that could cause another head injury until your doctor says it is okay. This includes playing sports.  Ask your doctor when it is safe for you to go back to your normal activities, such as work or school.  Ask your doctor when you can drive, ride a bicycle, or use machines. Do not do these activities if you are dizzy.    Lifestyle    A sign telling the reader not to drink beer, wine, or hard liquor.  Do not drink alcohol until your doctor says it is okay.  Do not use drugs.  If it is hard to remember things, write them down.  If you are easily distracted, try to do one thing at a time.  Talk with family members or close friends when making important decisions.  Tell your friends, family, a trusted co-worker, and  about your injury, symptoms, and limits (restrictions). Have them watch for any problems that are new or getting worse.    General instructions    Take over-the-counter and prescription medicines only as told by your doctor.  Have a responsible adult stay with you for 24 hours after your head injury. This person should watch you for any changes in your symptoms and be ready to get help.  Keep all follow-up visits to catch any new problems early.    How is this prevented?    Having another head injury can be dangerous. Another injury can lead to brain damage, brain swelling, or death. You can avoid this by:  Working on your balance and strength. This can help you avoid falls.  Wearing a seat belt when you are in a moving vehicle.  Wearing a helmet when you:  Ride a bicycle.  Ski.  Do any other sport or activity that has a risk of injury.  Making your home safer by:  Getting rid of clutter from the floors and stairs. This includes things that can make you trip.  Using grab bars in bathrooms and handrails by stairs.  Placing non-slip mats on floors and in bathtubs.  Putting more light in dim areas.  Where to find more information  [employer] Association: biausa.org    Contact a doctor if:  These symptoms do not go away:  Headaches.  Dizziness.  Double vision or vision changes.  Trouble sleeping.  Changes in mood.  You have new symptoms.    Get help right away if:  You have sudden:  Headache that is very bad.  Vomiting that does not stop.  Changes in the size of one of your pupils. Pupils are the black centers of your eyes.  Changes in how you see (vision).  More confusion or more grumpy moods.  You have a seizure.  Your symptoms get worse.  You have a clear or bloody fluid coming from your nose or ears.  These symptoms may be an emergency. Get help right away. Call 911.  Do not wait to see if the symptoms will go away.  Do not drive yourself to the hospital.

## 2025-06-29 NOTE — ED ADULT NURSE NOTE - CHIEF COMPLAINT QUOTE
pt's  states that she had a fall and hit her head three days. pt was seen at [facility] and had a CT of the head. pt is on 81 ASA

## 2025-06-29 NOTE — ED ADULT TRIAGE NOTE - CHIEF COMPLAINT QUOTE
pt's  states that she had a fall and hit her head three days. pt was seen at [facility] and had a CT of the head. pt is on 81 ASA

## 2025-07-12 ENCOUNTER — APPOINTMENT (OUTPATIENT)
Dept: MRI IMAGING | Facility: CLINIC | Age: 80
End: 2025-07-12

## 2025-07-25 ENCOUNTER — APPOINTMENT (OUTPATIENT)
Dept: MRI IMAGING | Facility: CLINIC | Age: 80
End: 2025-07-25

## 2025-08-01 ENCOUNTER — APPOINTMENT (OUTPATIENT)
Dept: MRI IMAGING | Facility: CLINIC | Age: 80
End: 2025-08-01
Payer: MEDICARE

## 2025-08-01 PROCEDURE — 70553 MRI BRAIN STEM W/O & W/DYE: CPT

## 2025-08-01 PROCEDURE — A9585: CPT
